# Patient Record
Sex: FEMALE | Race: WHITE | NOT HISPANIC OR LATINO | Employment: OTHER | URBAN - METROPOLITAN AREA
[De-identification: names, ages, dates, MRNs, and addresses within clinical notes are randomized per-mention and may not be internally consistent; named-entity substitution may affect disease eponyms.]

---

## 2017-01-03 ENCOUNTER — AMBULATORY - HEALTHEAST (OUTPATIENT)
Dept: ONCOLOGY | Facility: CLINIC | Age: 72
End: 2017-01-03

## 2017-01-03 ENCOUNTER — COMMUNICATION - HEALTHEAST (OUTPATIENT)
Dept: ONCOLOGY | Facility: CLINIC | Age: 72
End: 2017-01-03

## 2017-01-03 DIAGNOSIS — T45.1X5A PERIPHERAL NEUROPATHY DUE TO CHEMOTHERAPY (H): ICD-10-CM

## 2017-01-03 DIAGNOSIS — G62.0 PERIPHERAL NEUROPATHY DUE TO CHEMOTHERAPY (H): ICD-10-CM

## 2017-01-10 ENCOUNTER — AMBULATORY - HEALTHEAST (OUTPATIENT)
Dept: ONCOLOGY | Facility: CLINIC | Age: 72
End: 2017-01-10

## 2017-01-10 DIAGNOSIS — T45.1X5A PERIPHERAL NEUROPATHY DUE TO CHEMOTHERAPY (H): ICD-10-CM

## 2017-01-10 DIAGNOSIS — G62.0 PERIPHERAL NEUROPATHY DUE TO CHEMOTHERAPY (H): ICD-10-CM

## 2017-01-17 ENCOUNTER — AMBULATORY - HEALTHEAST (OUTPATIENT)
Dept: ONCOLOGY | Facility: CLINIC | Age: 72
End: 2017-01-17

## 2017-01-17 DIAGNOSIS — T45.1X5A PERIPHERAL NEUROPATHY DUE TO CHEMOTHERAPY (H): ICD-10-CM

## 2017-01-17 DIAGNOSIS — G62.0 PERIPHERAL NEUROPATHY DUE TO CHEMOTHERAPY (H): ICD-10-CM

## 2017-01-24 ENCOUNTER — AMBULATORY - HEALTHEAST (OUTPATIENT)
Dept: ONCOLOGY | Facility: CLINIC | Age: 72
End: 2017-01-24

## 2017-01-24 DIAGNOSIS — G62.0 PERIPHERAL NEUROPATHY DUE TO CHEMOTHERAPY (H): ICD-10-CM

## 2017-01-24 DIAGNOSIS — T45.1X5A PERIPHERAL NEUROPATHY DUE TO CHEMOTHERAPY (H): ICD-10-CM

## 2017-01-31 ENCOUNTER — AMBULATORY - HEALTHEAST (OUTPATIENT)
Dept: ONCOLOGY | Facility: CLINIC | Age: 72
End: 2017-01-31

## 2017-01-31 DIAGNOSIS — G62.0 PERIPHERAL NEUROPATHY DUE TO CHEMOTHERAPY (H): ICD-10-CM

## 2017-01-31 DIAGNOSIS — T45.1X5A PERIPHERAL NEUROPATHY DUE TO CHEMOTHERAPY (H): ICD-10-CM

## 2017-02-02 ENCOUNTER — AMBULATORY - HEALTHEAST (OUTPATIENT)
Dept: ONCOLOGY | Facility: CLINIC | Age: 72
End: 2017-02-02

## 2017-02-02 ENCOUNTER — OFFICE VISIT - HEALTHEAST (OUTPATIENT)
Dept: ONCOLOGY | Facility: CLINIC | Age: 72
End: 2017-02-02

## 2017-02-02 DIAGNOSIS — C50.411 BREAST CANCER OF UPPER-OUTER QUADRANT OF RIGHT FEMALE BREAST (H): ICD-10-CM

## 2017-02-02 DIAGNOSIS — G62.0 PERIPHERAL NEUROPATHY DUE TO CHEMOTHERAPY (H): ICD-10-CM

## 2017-02-02 DIAGNOSIS — T45.1X5A PERIPHERAL NEUROPATHY DUE TO CHEMOTHERAPY (H): ICD-10-CM

## 2017-02-07 ENCOUNTER — AMBULATORY - HEALTHEAST (OUTPATIENT)
Dept: ONCOLOGY | Facility: CLINIC | Age: 72
End: 2017-02-07

## 2017-02-07 DIAGNOSIS — M21.371 FOOT DROP, RIGHT: ICD-10-CM

## 2017-02-07 DIAGNOSIS — G57.93 NEUROPATHY OF BOTH FEET: ICD-10-CM

## 2017-02-16 ENCOUNTER — AMBULATORY - HEALTHEAST (OUTPATIENT)
Dept: ONCOLOGY | Facility: CLINIC | Age: 72
End: 2017-02-16

## 2017-02-16 DIAGNOSIS — G57.93 NEUROPATHY OF BOTH FEET: ICD-10-CM

## 2017-02-16 DIAGNOSIS — M21.371 FOOT DROP, RIGHT: ICD-10-CM

## 2017-02-16 DIAGNOSIS — G62.0 PERIPHERAL NEUROPATHY DUE TO CHEMOTHERAPY (H): ICD-10-CM

## 2017-02-16 DIAGNOSIS — T45.1X5A PERIPHERAL NEUROPATHY DUE TO CHEMOTHERAPY (H): ICD-10-CM

## 2017-02-21 ENCOUNTER — AMBULATORY - HEALTHEAST (OUTPATIENT)
Dept: ONCOLOGY | Facility: CLINIC | Age: 72
End: 2017-02-21

## 2017-02-21 DIAGNOSIS — G57.93 NEUROPATHY OF BOTH FEET: ICD-10-CM

## 2017-02-21 DIAGNOSIS — T45.1X5A PERIPHERAL NEUROPATHY DUE TO CHEMOTHERAPY (H): ICD-10-CM

## 2017-02-21 DIAGNOSIS — M21.371 FOOT DROP, RIGHT: ICD-10-CM

## 2017-02-21 DIAGNOSIS — G62.0 PERIPHERAL NEUROPATHY DUE TO CHEMOTHERAPY (H): ICD-10-CM

## 2017-02-28 ENCOUNTER — AMBULATORY - HEALTHEAST (OUTPATIENT)
Dept: ONCOLOGY | Facility: CLINIC | Age: 72
End: 2017-02-28

## 2017-02-28 DIAGNOSIS — G57.93 NEUROPATHY OF BOTH FEET: ICD-10-CM

## 2017-02-28 DIAGNOSIS — M21.371 FOOT DROP, RIGHT: ICD-10-CM

## 2017-02-28 DIAGNOSIS — T45.1X5A PERIPHERAL NEUROPATHY DUE TO CHEMOTHERAPY (H): ICD-10-CM

## 2017-02-28 DIAGNOSIS — G62.0 PERIPHERAL NEUROPATHY DUE TO CHEMOTHERAPY (H): ICD-10-CM

## 2017-03-07 ENCOUNTER — OFFICE VISIT - HEALTHEAST (OUTPATIENT)
Dept: ONCOLOGY | Facility: CLINIC | Age: 72
End: 2017-03-07

## 2017-03-07 ENCOUNTER — AMBULATORY - HEALTHEAST (OUTPATIENT)
Dept: ONCOLOGY | Facility: CLINIC | Age: 72
End: 2017-03-07

## 2017-03-07 DIAGNOSIS — T45.1X5A PERIPHERAL NEUROPATHY DUE TO CHEMOTHERAPY (H): ICD-10-CM

## 2017-03-07 DIAGNOSIS — M89.9 DISORDER OF BONE AND CARTILAGE: ICD-10-CM

## 2017-03-07 DIAGNOSIS — G62.0 PERIPHERAL NEUROPATHY DUE TO CHEMOTHERAPY (H): ICD-10-CM

## 2017-03-07 DIAGNOSIS — M94.9 DISORDER OF BONE AND CARTILAGE: ICD-10-CM

## 2017-03-07 DIAGNOSIS — G57.93 NEUROPATHY OF BOTH FEET: ICD-10-CM

## 2017-03-07 DIAGNOSIS — C50.411 BREAST CANCER OF UPPER-OUTER QUADRANT OF RIGHT FEMALE BREAST (H): ICD-10-CM

## 2017-03-07 DIAGNOSIS — Z71.9 HEALTH EDUCATION/COUNSELING: ICD-10-CM

## 2017-03-07 DIAGNOSIS — M21.371 FOOT DROP, RIGHT: ICD-10-CM

## 2017-03-14 ENCOUNTER — AMBULATORY - HEALTHEAST (OUTPATIENT)
Dept: ONCOLOGY | Facility: CLINIC | Age: 72
End: 2017-03-14

## 2017-03-14 DIAGNOSIS — M21.371 FOOT DROP, RIGHT: ICD-10-CM

## 2017-03-14 DIAGNOSIS — G57.93 NEUROPATHY OF BOTH FEET: ICD-10-CM

## 2017-03-14 DIAGNOSIS — G62.0 PERIPHERAL NEUROPATHY DUE TO CHEMOTHERAPY (H): ICD-10-CM

## 2017-03-14 DIAGNOSIS — T45.1X5A PERIPHERAL NEUROPATHY DUE TO CHEMOTHERAPY (H): ICD-10-CM

## 2017-03-21 ENCOUNTER — AMBULATORY - HEALTHEAST (OUTPATIENT)
Dept: ONCOLOGY | Facility: CLINIC | Age: 72
End: 2017-03-21

## 2017-03-21 DIAGNOSIS — M21.371 FOOT DROP, RIGHT: ICD-10-CM

## 2017-03-21 DIAGNOSIS — G57.93 NEUROPATHY OF BOTH FEET: ICD-10-CM

## 2017-03-24 ENCOUNTER — COMMUNICATION - HEALTHEAST (OUTPATIENT)
Dept: ONCOLOGY | Facility: CLINIC | Age: 72
End: 2017-03-24

## 2017-03-28 ENCOUNTER — AMBULATORY - HEALTHEAST (OUTPATIENT)
Dept: ONCOLOGY | Facility: CLINIC | Age: 72
End: 2017-03-28

## 2017-03-28 DIAGNOSIS — M21.371 FOOT DROP, RIGHT: ICD-10-CM

## 2017-03-28 DIAGNOSIS — G57.93 NEUROPATHY OF BOTH FEET: ICD-10-CM

## 2017-04-04 ENCOUNTER — AMBULATORY - HEALTHEAST (OUTPATIENT)
Dept: ONCOLOGY | Facility: CLINIC | Age: 72
End: 2017-04-04

## 2017-04-04 DIAGNOSIS — M21.371 FOOT DROP, RIGHT: ICD-10-CM

## 2017-04-04 DIAGNOSIS — G57.93 NEUROPATHY OF BOTH FEET: ICD-10-CM

## 2017-04-06 ENCOUNTER — AMBULATORY - HEALTHEAST (OUTPATIENT)
Dept: ONCOLOGY | Facility: CLINIC | Age: 72
End: 2017-04-06

## 2017-04-06 ENCOUNTER — OFFICE VISIT - HEALTHEAST (OUTPATIENT)
Dept: RADIATION ONCOLOGY | Facility: CLINIC | Age: 72
End: 2017-04-06

## 2017-04-06 DIAGNOSIS — C50.411 BREAST CANCER OF UPPER-OUTER QUADRANT OF RIGHT FEMALE BREAST (H): ICD-10-CM

## 2017-04-11 ENCOUNTER — AMBULATORY - HEALTHEAST (OUTPATIENT)
Dept: ONCOLOGY | Facility: CLINIC | Age: 72
End: 2017-04-11

## 2017-04-11 DIAGNOSIS — M21.371 FOOT DROP, RIGHT: ICD-10-CM

## 2017-04-11 DIAGNOSIS — G57.93 NEUROPATHY OF BOTH FEET: ICD-10-CM

## 2017-04-18 ENCOUNTER — AMBULATORY - HEALTHEAST (OUTPATIENT)
Dept: ONCOLOGY | Facility: CLINIC | Age: 72
End: 2017-04-18

## 2017-04-18 DIAGNOSIS — G62.0 PERIPHERAL NEUROPATHY DUE TO CHEMOTHERAPY (H): ICD-10-CM

## 2017-04-18 DIAGNOSIS — T45.1X5A PERIPHERAL NEUROPATHY DUE TO CHEMOTHERAPY (H): ICD-10-CM

## 2017-04-18 DIAGNOSIS — G57.93 NEUROPATHY OF BOTH FEET: ICD-10-CM

## 2017-04-18 DIAGNOSIS — M21.371 FOOT DROP, RIGHT: ICD-10-CM

## 2017-04-25 ENCOUNTER — AMBULATORY - HEALTHEAST (OUTPATIENT)
Dept: ONCOLOGY | Facility: CLINIC | Age: 72
End: 2017-04-25

## 2017-04-25 DIAGNOSIS — M21.371 FOOT DROP, RIGHT: ICD-10-CM

## 2017-04-25 DIAGNOSIS — G62.0 PERIPHERAL NEUROPATHY DUE TO CHEMOTHERAPY (H): ICD-10-CM

## 2017-04-25 DIAGNOSIS — T45.1X5A PERIPHERAL NEUROPATHY DUE TO CHEMOTHERAPY (H): ICD-10-CM

## 2017-04-25 DIAGNOSIS — G57.93 NEUROPATHY OF BOTH FEET: ICD-10-CM

## 2017-05-02 ENCOUNTER — AMBULATORY - HEALTHEAST (OUTPATIENT)
Dept: ONCOLOGY | Facility: CLINIC | Age: 72
End: 2017-05-02

## 2017-05-02 DIAGNOSIS — T45.1X5A PERIPHERAL NEUROPATHY DUE TO CHEMOTHERAPY (H): ICD-10-CM

## 2017-05-02 DIAGNOSIS — M21.371 FOOT DROP, RIGHT: ICD-10-CM

## 2017-05-02 DIAGNOSIS — G62.0 PERIPHERAL NEUROPATHY DUE TO CHEMOTHERAPY (H): ICD-10-CM

## 2017-05-02 DIAGNOSIS — G57.93 NEUROPATHY OF BOTH FEET: ICD-10-CM

## 2017-05-04 ENCOUNTER — OFFICE VISIT - HEALTHEAST (OUTPATIENT)
Dept: ONCOLOGY | Facility: CLINIC | Age: 72
End: 2017-05-04

## 2017-05-04 ENCOUNTER — AMBULATORY - HEALTHEAST (OUTPATIENT)
Dept: ONCOLOGY | Facility: CLINIC | Age: 72
End: 2017-05-04

## 2017-05-04 DIAGNOSIS — G62.0 PERIPHERAL NEUROPATHY DUE TO CHEMOTHERAPY (H): ICD-10-CM

## 2017-05-04 DIAGNOSIS — Z12.31 ENCOUNTER FOR SCREENING MAMMOGRAM FOR MALIGNANT NEOPLASM OF BREAST: ICD-10-CM

## 2017-05-04 DIAGNOSIS — C50.411 BREAST CANCER OF UPPER-OUTER QUADRANT OF RIGHT FEMALE BREAST (H): ICD-10-CM

## 2017-05-04 DIAGNOSIS — T45.1X5A PERIPHERAL NEUROPATHY DUE TO CHEMOTHERAPY (H): ICD-10-CM

## 2017-05-04 ASSESSMENT — MIFFLIN-ST. JEOR: SCORE: 1229.1

## 2017-05-12 ENCOUNTER — AMBULATORY - HEALTHEAST (OUTPATIENT)
Dept: ONCOLOGY | Facility: CLINIC | Age: 72
End: 2017-05-12

## 2017-05-12 DIAGNOSIS — G57.93 NEUROPATHY OF BOTH FEET: ICD-10-CM

## 2017-05-12 DIAGNOSIS — M21.371 FOOT DROP, RIGHT: ICD-10-CM

## 2017-05-16 ENCOUNTER — AMBULATORY - HEALTHEAST (OUTPATIENT)
Dept: ONCOLOGY | Facility: CLINIC | Age: 72
End: 2017-05-16

## 2017-05-16 DIAGNOSIS — M21.371 FOOT DROP, RIGHT: ICD-10-CM

## 2017-05-16 DIAGNOSIS — G57.93 NEUROPATHY OF BOTH FEET: ICD-10-CM

## 2017-05-16 DIAGNOSIS — T45.1X5A PERIPHERAL NEUROPATHY DUE TO CHEMOTHERAPY (H): ICD-10-CM

## 2017-05-16 DIAGNOSIS — G62.0 PERIPHERAL NEUROPATHY DUE TO CHEMOTHERAPY (H): ICD-10-CM

## 2017-05-23 ENCOUNTER — AMBULATORY - HEALTHEAST (OUTPATIENT)
Dept: ONCOLOGY | Facility: CLINIC | Age: 72
End: 2017-05-23

## 2017-05-23 DIAGNOSIS — M21.371 FOOT DROP, RIGHT: ICD-10-CM

## 2017-05-23 DIAGNOSIS — G57.93 NEUROPATHY OF BOTH FEET: ICD-10-CM

## 2017-08-04 ENCOUNTER — OFFICE VISIT - HEALTHEAST (OUTPATIENT)
Dept: ONCOLOGY | Facility: CLINIC | Age: 72
End: 2017-08-04

## 2017-08-04 DIAGNOSIS — T45.1X5A PERIPHERAL NEUROPATHY DUE TO CHEMOTHERAPY (H): ICD-10-CM

## 2017-08-04 DIAGNOSIS — G62.0 PERIPHERAL NEUROPATHY DUE TO CHEMOTHERAPY (H): ICD-10-CM

## 2017-08-04 DIAGNOSIS — C50.411 BREAST CANCER OF UPPER-OUTER QUADRANT OF RIGHT FEMALE BREAST (H): ICD-10-CM

## 2017-08-04 ASSESSMENT — MIFFLIN-ST. JEOR: SCORE: 1254.05

## 2017-11-06 ENCOUNTER — OFFICE VISIT - HEALTHEAST (OUTPATIENT)
Dept: ONCOLOGY | Facility: CLINIC | Age: 72
End: 2017-11-06

## 2017-11-06 ENCOUNTER — AMBULATORY - HEALTHEAST (OUTPATIENT)
Dept: ONCOLOGY | Facility: CLINIC | Age: 72
End: 2017-11-06

## 2017-11-06 DIAGNOSIS — G62.0 PERIPHERAL NEUROPATHY DUE TO CHEMOTHERAPY (H): ICD-10-CM

## 2017-11-06 DIAGNOSIS — Z17.1 MALIGNANT NEOPLASM OF UPPER-OUTER QUADRANT OF RIGHT BREAST IN FEMALE, ESTROGEN RECEPTOR NEGATIVE (H): ICD-10-CM

## 2017-11-06 DIAGNOSIS — C50.411 MALIGNANT NEOPLASM OF UPPER-OUTER QUADRANT OF RIGHT BREAST IN FEMALE, ESTROGEN RECEPTOR NEGATIVE (H): ICD-10-CM

## 2017-11-06 DIAGNOSIS — T45.1X5A PERIPHERAL NEUROPATHY DUE TO CHEMOTHERAPY (H): ICD-10-CM

## 2017-12-18 ENCOUNTER — HOSPITAL ENCOUNTER (OUTPATIENT)
Dept: MAMMOGRAPHY | Facility: CLINIC | Age: 72
Discharge: HOME | End: 2017-12-18
Attending: INTERNAL MEDICINE

## 2017-12-18 DIAGNOSIS — Z12.31 ENCOUNTER FOR SCREENING MAMMOGRAM FOR MALIGNANT NEOPLASM OF BREAST: ICD-10-CM

## 2017-12-18 DIAGNOSIS — C50.411 BREAST CANCER OF UPPER-OUTER QUADRANT OF RIGHT FEMALE BREAST (H): ICD-10-CM

## 2017-12-28 ENCOUNTER — OFFICE VISIT - HEALTHEAST (OUTPATIENT)
Dept: SURGERY | Facility: CLINIC | Age: 72
End: 2017-12-28

## 2017-12-28 DIAGNOSIS — Z85.3 PERSONAL HISTORY OF BREAST CANCER: ICD-10-CM

## 2018-01-19 ENCOUNTER — OFFICE VISIT - HEALTHEAST (OUTPATIENT)
Dept: FAMILY MEDICINE | Facility: CLINIC | Age: 73
End: 2018-01-19

## 2018-01-19 DIAGNOSIS — Z17.1 MALIGNANT NEOPLASM OF UPPER-OUTER QUADRANT OF RIGHT BREAST IN FEMALE, ESTROGEN RECEPTOR NEGATIVE (H): ICD-10-CM

## 2018-01-19 DIAGNOSIS — D12.6 BENIGN NEOPLASM OF COLON: ICD-10-CM

## 2018-01-19 DIAGNOSIS — E87.6 HYPOKALEMIA: ICD-10-CM

## 2018-01-19 DIAGNOSIS — M94.9 DISORDER OF BONE AND CARTILAGE: ICD-10-CM

## 2018-01-19 DIAGNOSIS — E78.2 MIXED HYPERLIPIDEMIA: ICD-10-CM

## 2018-01-19 DIAGNOSIS — C50.411 MALIGNANT NEOPLASM OF UPPER-OUTER QUADRANT OF RIGHT BREAST IN FEMALE, ESTROGEN RECEPTOR NEGATIVE (H): ICD-10-CM

## 2018-01-19 DIAGNOSIS — M89.9 DISORDER OF BONE AND CARTILAGE: ICD-10-CM

## 2018-01-19 DIAGNOSIS — R73.09 OTHER ABNORMAL GLUCOSE: ICD-10-CM

## 2018-01-19 LAB
ALBUMIN SERPL-MCNC: 3.8 G/DL (ref 3.5–5)
ALP SERPL-CCNC: 96 U/L (ref 45–120)
ALT SERPL W P-5'-P-CCNC: 21 U/L (ref 0–45)
ANION GAP SERPL CALCULATED.3IONS-SCNC: 7 MMOL/L (ref 5–18)
AST SERPL W P-5'-P-CCNC: 24 U/L (ref 0–40)
BILIRUB SERPL-MCNC: 0.6 MG/DL (ref 0–1)
BUN SERPL-MCNC: 12 MG/DL (ref 8–28)
CALCIUM SERPL-MCNC: 9.6 MG/DL (ref 8.5–10.5)
CHLORIDE BLD-SCNC: 103 MMOL/L (ref 98–107)
CHOLEST SERPL-MCNC: 155 MG/DL
CO2 SERPL-SCNC: 28 MMOL/L (ref 22–31)
CREAT SERPL-MCNC: 0.75 MG/DL (ref 0.6–1.1)
FASTING STATUS PATIENT QL REPORTED: YES
GFR SERPL CREATININE-BSD FRML MDRD: >60 ML/MIN/1.73M2
GLUCOSE BLD-MCNC: 103 MG/DL (ref 70–125)
HDLC SERPL-MCNC: 56 MG/DL
LDLC SERPL CALC-MCNC: 87 MG/DL
POTASSIUM BLD-SCNC: 4.2 MMOL/L (ref 3.5–5)
PROT SERPL-MCNC: 6.5 G/DL (ref 6–8)
SODIUM SERPL-SCNC: 138 MMOL/L (ref 136–145)
TRIGL SERPL-MCNC: 58 MG/DL

## 2018-01-19 ASSESSMENT — MIFFLIN-ST. JEOR: SCORE: 1269.92

## 2018-01-22 ENCOUNTER — COMMUNICATION - HEALTHEAST (OUTPATIENT)
Dept: FAMILY MEDICINE | Facility: CLINIC | Age: 73
End: 2018-01-22

## 2018-02-05 ENCOUNTER — AMBULATORY - HEALTHEAST (OUTPATIENT)
Dept: INFUSION THERAPY | Facility: CLINIC | Age: 73
End: 2018-02-05

## 2018-02-05 ENCOUNTER — OFFICE VISIT - HEALTHEAST (OUTPATIENT)
Dept: ONCOLOGY | Facility: CLINIC | Age: 73
End: 2018-02-05

## 2018-02-05 DIAGNOSIS — T45.1X5A PERIPHERAL NEUROPATHY DUE TO CHEMOTHERAPY (H): ICD-10-CM

## 2018-02-05 DIAGNOSIS — C50.411 MALIGNANT NEOPLASM OF UPPER-OUTER QUADRANT OF RIGHT BREAST IN FEMALE, ESTROGEN RECEPTOR NEGATIVE (H): ICD-10-CM

## 2018-02-05 DIAGNOSIS — Z17.1 MALIGNANT NEOPLASM OF UPPER-OUTER QUADRANT OF RIGHT BREAST IN FEMALE, ESTROGEN RECEPTOR NEGATIVE (H): ICD-10-CM

## 2018-02-05 DIAGNOSIS — G62.0 PERIPHERAL NEUROPATHY DUE TO CHEMOTHERAPY (H): ICD-10-CM

## 2018-05-07 ENCOUNTER — OFFICE VISIT - HEALTHEAST (OUTPATIENT)
Dept: ONCOLOGY | Facility: CLINIC | Age: 73
End: 2018-05-07

## 2018-05-07 DIAGNOSIS — C50.411 MALIGNANT NEOPLASM OF UPPER-OUTER QUADRANT OF RIGHT BREAST IN FEMALE, ESTROGEN RECEPTOR NEGATIVE (H): ICD-10-CM

## 2018-05-07 DIAGNOSIS — Z17.1 MALIGNANT NEOPLASM OF UPPER-OUTER QUADRANT OF RIGHT BREAST IN FEMALE, ESTROGEN RECEPTOR NEGATIVE (H): ICD-10-CM

## 2018-05-07 DIAGNOSIS — G62.0 PERIPHERAL NEUROPATHY DUE TO CHEMOTHERAPY (H): ICD-10-CM

## 2018-05-07 DIAGNOSIS — T45.1X5A PERIPHERAL NEUROPATHY DUE TO CHEMOTHERAPY (H): ICD-10-CM

## 2018-08-08 ENCOUNTER — OFFICE VISIT - HEALTHEAST (OUTPATIENT)
Dept: ONCOLOGY | Facility: CLINIC | Age: 73
End: 2018-08-08

## 2018-08-08 ENCOUNTER — AMBULATORY - HEALTHEAST (OUTPATIENT)
Dept: ONCOLOGY | Facility: CLINIC | Age: 73
End: 2018-08-08

## 2018-08-08 DIAGNOSIS — Z12.31 ENCOUNTER FOR SCREENING MAMMOGRAM FOR MALIGNANT NEOPLASM OF BREAST: ICD-10-CM

## 2018-08-08 DIAGNOSIS — C50.411 MALIGNANT NEOPLASM OF UPPER-OUTER QUADRANT OF RIGHT BREAST IN FEMALE, ESTROGEN RECEPTOR NEGATIVE (H): ICD-10-CM

## 2018-08-08 DIAGNOSIS — G62.0 PERIPHERAL NEUROPATHY DUE TO CHEMOTHERAPY (H): ICD-10-CM

## 2018-08-08 DIAGNOSIS — M89.9 DISORDER OF BONE AND CARTILAGE: ICD-10-CM

## 2018-08-08 DIAGNOSIS — T45.1X5A PERIPHERAL NEUROPATHY DUE TO CHEMOTHERAPY (H): ICD-10-CM

## 2018-08-08 DIAGNOSIS — M94.9 DISORDER OF BONE AND CARTILAGE: ICD-10-CM

## 2018-08-08 DIAGNOSIS — Z17.1 MALIGNANT NEOPLASM OF UPPER-OUTER QUADRANT OF RIGHT BREAST IN FEMALE, ESTROGEN RECEPTOR NEGATIVE (H): ICD-10-CM

## 2018-08-08 ASSESSMENT — MIFFLIN-ST. JEOR: SCORE: 1300.32

## 2018-08-09 ENCOUNTER — COMMUNICATION - HEALTHEAST (OUTPATIENT)
Dept: ONCOLOGY | Facility: CLINIC | Age: 73
End: 2018-08-09

## 2018-08-10 ENCOUNTER — COMMUNICATION - HEALTHEAST (OUTPATIENT)
Dept: ONCOLOGY | Facility: CLINIC | Age: 73
End: 2018-08-10

## 2018-08-17 ENCOUNTER — COMMUNICATION - HEALTHEAST (OUTPATIENT)
Dept: FAMILY MEDICINE | Facility: CLINIC | Age: 73
End: 2018-08-17

## 2018-08-29 ENCOUNTER — COMMUNICATION - HEALTHEAST (OUTPATIENT)
Dept: FAMILY MEDICINE | Facility: CLINIC | Age: 73
End: 2018-08-29

## 2018-08-29 DIAGNOSIS — E78.2 MIXED HYPERLIPIDEMIA: ICD-10-CM

## 2018-09-05 ENCOUNTER — COMMUNICATION - HEALTHEAST (OUTPATIENT)
Dept: FAMILY MEDICINE | Facility: CLINIC | Age: 73
End: 2018-09-05

## 2018-09-05 ENCOUNTER — OFFICE VISIT - HEALTHEAST (OUTPATIENT)
Dept: FAMILY MEDICINE | Facility: CLINIC | Age: 73
End: 2018-09-05

## 2018-09-05 DIAGNOSIS — E78.2 MIXED HYPERLIPIDEMIA: ICD-10-CM

## 2018-09-05 DIAGNOSIS — R73.09 OTHER ABNORMAL GLUCOSE: ICD-10-CM

## 2018-09-05 DIAGNOSIS — Z12.11 SCREEN FOR COLON CANCER: ICD-10-CM

## 2018-09-05 DIAGNOSIS — E87.6 HYPOKALEMIA: ICD-10-CM

## 2018-09-05 LAB
ALBUMIN SERPL-MCNC: 3.6 G/DL (ref 3.5–5)
ALP SERPL-CCNC: 92 U/L (ref 45–120)
ALT SERPL W P-5'-P-CCNC: 23 U/L (ref 0–45)
ANION GAP SERPL CALCULATED.3IONS-SCNC: 10 MMOL/L (ref 5–18)
AST SERPL W P-5'-P-CCNC: 31 U/L (ref 0–40)
BILIRUB SERPL-MCNC: 0.5 MG/DL (ref 0–1)
BUN SERPL-MCNC: 11 MG/DL (ref 8–28)
CALCIUM SERPL-MCNC: 9.5 MG/DL (ref 8.5–10.5)
CHLORIDE BLD-SCNC: 108 MMOL/L (ref 98–107)
CHOLEST SERPL-MCNC: 196 MG/DL
CO2 SERPL-SCNC: 25 MMOL/L (ref 22–31)
CREAT SERPL-MCNC: 0.76 MG/DL (ref 0.6–1.1)
FASTING STATUS PATIENT QL REPORTED: YES
GFR SERPL CREATININE-BSD FRML MDRD: >60 ML/MIN/1.73M2
GLUCOSE BLD-MCNC: 95 MG/DL (ref 70–125)
HDLC SERPL-MCNC: 54 MG/DL
LDLC SERPL CALC-MCNC: 128 MG/DL
POTASSIUM BLD-SCNC: 4.5 MMOL/L (ref 3.5–5)
PROT SERPL-MCNC: 6.5 G/DL (ref 6–8)
SODIUM SERPL-SCNC: 143 MMOL/L (ref 136–145)
TRIGL SERPL-MCNC: 68 MG/DL

## 2018-11-01 ENCOUNTER — COMMUNICATION - HEALTHEAST (OUTPATIENT)
Dept: FAMILY MEDICINE | Facility: CLINIC | Age: 73
End: 2018-11-01

## 2018-11-01 DIAGNOSIS — E78.2 MIXED HYPERLIPIDEMIA: ICD-10-CM

## 2018-11-05 ENCOUNTER — HOSPITAL ENCOUNTER (OUTPATIENT)
Dept: MAMMOGRAPHY | Facility: CLINIC | Age: 73
Setting detail: RADIATION/ONCOLOGY SERIES
Discharge: STILL ADMITTED OR ADMINISTRATIVE | End: 2018-11-05
Attending: INTERNAL MEDICINE

## 2018-11-05 DIAGNOSIS — C50.411 MALIGNANT NEOPLASM OF UPPER-OUTER QUADRANT OF RIGHT BREAST IN FEMALE, ESTROGEN RECEPTOR NEGATIVE (H): ICD-10-CM

## 2018-11-05 DIAGNOSIS — Z17.1 MALIGNANT NEOPLASM OF UPPER-OUTER QUADRANT OF RIGHT BREAST IN FEMALE, ESTROGEN RECEPTOR NEGATIVE (H): ICD-10-CM

## 2018-11-05 DIAGNOSIS — Z12.31 ENCOUNTER FOR SCREENING MAMMOGRAM FOR MALIGNANT NEOPLASM OF BREAST: ICD-10-CM

## 2018-11-09 ENCOUNTER — OFFICE VISIT - HEALTHEAST (OUTPATIENT)
Dept: ONCOLOGY | Facility: CLINIC | Age: 73
End: 2018-11-09

## 2018-11-09 DIAGNOSIS — Z17.1 MALIGNANT NEOPLASM OF UPPER-OUTER QUADRANT OF RIGHT BREAST IN FEMALE, ESTROGEN RECEPTOR NEGATIVE (H): ICD-10-CM

## 2018-11-09 DIAGNOSIS — C50.411 MALIGNANT NEOPLASM OF UPPER-OUTER QUADRANT OF RIGHT BREAST IN FEMALE, ESTROGEN RECEPTOR NEGATIVE (H): ICD-10-CM

## 2018-11-09 DIAGNOSIS — G62.0 PERIPHERAL NEUROPATHY DUE TO CHEMOTHERAPY (H): ICD-10-CM

## 2018-11-09 DIAGNOSIS — T45.1X5A PERIPHERAL NEUROPATHY DUE TO CHEMOTHERAPY (H): ICD-10-CM

## 2019-02-08 ENCOUNTER — OFFICE VISIT - HEALTHEAST (OUTPATIENT)
Dept: ONCOLOGY | Facility: CLINIC | Age: 74
End: 2019-02-08

## 2019-02-08 DIAGNOSIS — M89.9 DISORDER OF BONE AND CARTILAGE: ICD-10-CM

## 2019-02-08 DIAGNOSIS — G62.0 PERIPHERAL NEUROPATHY DUE TO CHEMOTHERAPY (H): ICD-10-CM

## 2019-02-08 DIAGNOSIS — T45.1X5A PERIPHERAL NEUROPATHY DUE TO CHEMOTHERAPY (H): ICD-10-CM

## 2019-02-08 DIAGNOSIS — Z17.1 MALIGNANT NEOPLASM OF UPPER-OUTER QUADRANT OF RIGHT BREAST IN FEMALE, ESTROGEN RECEPTOR NEGATIVE (H): ICD-10-CM

## 2019-02-08 DIAGNOSIS — M94.9 DISORDER OF BONE AND CARTILAGE: ICD-10-CM

## 2019-02-08 DIAGNOSIS — C50.411 MALIGNANT NEOPLASM OF UPPER-OUTER QUADRANT OF RIGHT BREAST IN FEMALE, ESTROGEN RECEPTOR NEGATIVE (H): ICD-10-CM

## 2019-04-05 ENCOUNTER — OFFICE VISIT - HEALTHEAST (OUTPATIENT)
Dept: FAMILY MEDICINE | Facility: CLINIC | Age: 74
End: 2019-04-05

## 2019-04-05 DIAGNOSIS — E87.6 HYPOKALEMIA: ICD-10-CM

## 2019-04-05 DIAGNOSIS — E78.2 MIXED HYPERLIPIDEMIA: ICD-10-CM

## 2019-04-05 DIAGNOSIS — R73.09 OTHER ABNORMAL GLUCOSE: ICD-10-CM

## 2019-04-05 DIAGNOSIS — Z12.11 SCREEN FOR COLON CANCER: ICD-10-CM

## 2019-04-05 LAB
ALBUMIN SERPL-MCNC: 3.8 G/DL (ref 3.5–5)
ALP SERPL-CCNC: 92 U/L (ref 45–120)
ALT SERPL W P-5'-P-CCNC: 18 U/L (ref 0–45)
ANION GAP SERPL CALCULATED.3IONS-SCNC: 9 MMOL/L (ref 5–18)
AST SERPL W P-5'-P-CCNC: 23 U/L (ref 0–40)
BILIRUB SERPL-MCNC: 0.4 MG/DL (ref 0–1)
BUN SERPL-MCNC: 10 MG/DL (ref 8–28)
CALCIUM SERPL-MCNC: 9.6 MG/DL (ref 8.5–10.5)
CHLORIDE BLD-SCNC: 108 MMOL/L (ref 98–107)
CHOLEST SERPL-MCNC: 192 MG/DL
CO2 SERPL-SCNC: 26 MMOL/L (ref 22–31)
CREAT SERPL-MCNC: 0.77 MG/DL (ref 0.6–1.1)
FASTING STATUS PATIENT QL REPORTED: YES
GFR SERPL CREATININE-BSD FRML MDRD: >60 ML/MIN/1.73M2
GLUCOSE BLD-MCNC: 97 MG/DL (ref 70–125)
HDLC SERPL-MCNC: 62 MG/DL
LDLC SERPL CALC-MCNC: 115 MG/DL
POTASSIUM BLD-SCNC: 4.2 MMOL/L (ref 3.5–5)
PROT SERPL-MCNC: 6.5 G/DL (ref 6–8)
SODIUM SERPL-SCNC: 143 MMOL/L (ref 136–145)
TRIGL SERPL-MCNC: 77 MG/DL

## 2019-05-16 ENCOUNTER — RECORDS - HEALTHEAST (OUTPATIENT)
Dept: ADMINISTRATIVE | Facility: OTHER | Age: 74
End: 2019-05-16

## 2019-05-24 ENCOUNTER — AMBULATORY - HEALTHEAST (OUTPATIENT)
Dept: ONCOLOGY | Facility: CLINIC | Age: 74
End: 2019-05-24

## 2019-05-24 ENCOUNTER — OFFICE VISIT - HEALTHEAST (OUTPATIENT)
Dept: ONCOLOGY | Facility: CLINIC | Age: 74
End: 2019-05-24

## 2019-05-24 DIAGNOSIS — T45.1X5A PERIPHERAL NEUROPATHY DUE TO CHEMOTHERAPY (H): ICD-10-CM

## 2019-05-24 DIAGNOSIS — G62.0 PERIPHERAL NEUROPATHY DUE TO CHEMOTHERAPY (H): ICD-10-CM

## 2019-05-24 DIAGNOSIS — Z17.1 MALIGNANT NEOPLASM OF UPPER-OUTER QUADRANT OF RIGHT BREAST IN FEMALE, ESTROGEN RECEPTOR NEGATIVE (H): ICD-10-CM

## 2019-05-24 DIAGNOSIS — Z12.31 ENCOUNTER FOR SCREENING MAMMOGRAM FOR MALIGNANT NEOPLASM OF BREAST: ICD-10-CM

## 2019-05-24 DIAGNOSIS — C50.411 MALIGNANT NEOPLASM OF UPPER-OUTER QUADRANT OF RIGHT BREAST IN FEMALE, ESTROGEN RECEPTOR NEGATIVE (H): ICD-10-CM

## 2019-07-30 ENCOUNTER — COMMUNICATION - HEALTHEAST (OUTPATIENT)
Dept: ONCOLOGY | Facility: CLINIC | Age: 74
End: 2019-07-30

## 2019-10-31 ENCOUNTER — RECORDS - HEALTHEAST (OUTPATIENT)
Dept: ADMINISTRATIVE | Facility: OTHER | Age: 74
End: 2019-10-31

## 2019-11-13 ENCOUNTER — HOSPITAL ENCOUNTER (OUTPATIENT)
Dept: MAMMOGRAPHY | Facility: CLINIC | Age: 74
Setting detail: RADIATION/ONCOLOGY SERIES
Discharge: STILL ADMITTED OR ADMINISTRATIVE | End: 2019-11-13
Attending: INTERNAL MEDICINE

## 2019-11-13 DIAGNOSIS — C50.411 MALIGNANT NEOPLASM OF UPPER-OUTER QUADRANT OF RIGHT BREAST IN FEMALE, ESTROGEN RECEPTOR NEGATIVE (H): ICD-10-CM

## 2019-11-13 DIAGNOSIS — Z17.1 MALIGNANT NEOPLASM OF UPPER-OUTER QUADRANT OF RIGHT BREAST IN FEMALE, ESTROGEN RECEPTOR NEGATIVE (H): ICD-10-CM

## 2019-11-13 DIAGNOSIS — Z12.31 ENCOUNTER FOR SCREENING MAMMOGRAM FOR MALIGNANT NEOPLASM OF BREAST: ICD-10-CM

## 2019-11-20 ENCOUNTER — OFFICE VISIT - HEALTHEAST (OUTPATIENT)
Dept: ONCOLOGY | Facility: CLINIC | Age: 74
End: 2019-11-20

## 2019-11-20 DIAGNOSIS — C50.411 MALIGNANT NEOPLASM OF UPPER-OUTER QUADRANT OF RIGHT BREAST IN FEMALE, ESTROGEN RECEPTOR NEGATIVE (H): ICD-10-CM

## 2019-11-20 DIAGNOSIS — G62.0 PERIPHERAL NEUROPATHY DUE TO CHEMOTHERAPY (H): ICD-10-CM

## 2019-11-20 DIAGNOSIS — T45.1X5A PERIPHERAL NEUROPATHY DUE TO CHEMOTHERAPY (H): ICD-10-CM

## 2019-11-20 DIAGNOSIS — Z17.1 MALIGNANT NEOPLASM OF UPPER-OUTER QUADRANT OF RIGHT BREAST IN FEMALE, ESTROGEN RECEPTOR NEGATIVE (H): ICD-10-CM

## 2019-11-20 DIAGNOSIS — R10.13 EPIGASTRIC PAIN: ICD-10-CM

## 2019-11-22 ENCOUNTER — RECORDS - HEALTHEAST (OUTPATIENT)
Dept: ADMINISTRATIVE | Facility: OTHER | Age: 74
End: 2019-11-22

## 2019-11-25 ENCOUNTER — HOSPITAL ENCOUNTER (OUTPATIENT)
Dept: CT IMAGING | Facility: CLINIC | Age: 74
Discharge: HOME | End: 2019-11-25
Attending: PHYSICIAN ASSISTANT

## 2019-11-25 DIAGNOSIS — R10.13 EPIGASTRIC PAIN: ICD-10-CM

## 2019-11-25 LAB
CREAT BLD-MCNC: 0.9 MG/DL (ref 0.6–1.1)
GFR SERPL CREATININE-BSD FRML MDRD: >60 ML/MIN/1.73M2

## 2019-12-04 ENCOUNTER — COMMUNICATION - HEALTHEAST (OUTPATIENT)
Dept: ONCOLOGY | Facility: CLINIC | Age: 74
End: 2019-12-04

## 2020-01-20 ENCOUNTER — COMMUNICATION - HEALTHEAST (OUTPATIENT)
Dept: FAMILY MEDICINE | Facility: CLINIC | Age: 75
End: 2020-01-20

## 2020-01-20 DIAGNOSIS — E78.2 MIXED HYPERLIPIDEMIA: ICD-10-CM

## 2020-01-28 ENCOUNTER — RECORDS - HEALTHEAST (OUTPATIENT)
Dept: ADMINISTRATIVE | Facility: OTHER | Age: 75
End: 2020-01-28

## 2020-02-07 ENCOUNTER — OFFICE VISIT - HEALTHEAST (OUTPATIENT)
Dept: FAMILY MEDICINE | Facility: CLINIC | Age: 75
End: 2020-02-07

## 2020-02-07 DIAGNOSIS — C50.411 MALIGNANT NEOPLASM OF UPPER-OUTER QUADRANT OF RIGHT BREAST IN FEMALE, ESTROGEN RECEPTOR NEGATIVE (H): ICD-10-CM

## 2020-02-07 DIAGNOSIS — M94.9 DISORDER OF BONE AND CARTILAGE: ICD-10-CM

## 2020-02-07 DIAGNOSIS — M89.9 DISORDER OF BONE AND CARTILAGE: ICD-10-CM

## 2020-02-07 DIAGNOSIS — Z00.01 ENCOUNTER FOR GENERAL ADULT MEDICAL EXAMINATION WITH ABNORMAL FINDINGS: ICD-10-CM

## 2020-02-07 DIAGNOSIS — Z17.1 MALIGNANT NEOPLASM OF UPPER-OUTER QUADRANT OF RIGHT BREAST IN FEMALE, ESTROGEN RECEPTOR NEGATIVE (H): ICD-10-CM

## 2020-02-07 DIAGNOSIS — R73.09 OTHER ABNORMAL GLUCOSE: ICD-10-CM

## 2020-02-07 DIAGNOSIS — D12.6 BENIGN NEOPLASM OF COLON, UNSPECIFIED PART OF COLON: ICD-10-CM

## 2020-02-07 DIAGNOSIS — E78.2 MIXED HYPERLIPIDEMIA: ICD-10-CM

## 2020-02-07 LAB
ALBUMIN SERPL-MCNC: 4.2 G/DL (ref 3.5–5)
ALP SERPL-CCNC: 64 U/L (ref 45–120)
ALT SERPL W P-5'-P-CCNC: 35 U/L (ref 0–45)
ANION GAP SERPL CALCULATED.3IONS-SCNC: 7 MMOL/L (ref 5–18)
AST SERPL W P-5'-P-CCNC: 32 U/L (ref 0–40)
BILIRUB SERPL-MCNC: 0.7 MG/DL (ref 0–1)
BUN SERPL-MCNC: 8 MG/DL (ref 8–28)
CALCIUM SERPL-MCNC: 9.4 MG/DL (ref 8.5–10.5)
CHLORIDE BLD-SCNC: 106 MMOL/L (ref 98–107)
CHOLEST SERPL-MCNC: 177 MG/DL
CO2 SERPL-SCNC: 29 MMOL/L (ref 22–31)
CREAT SERPL-MCNC: 0.83 MG/DL (ref 0.6–1.1)
FASTING STATUS PATIENT QL REPORTED: YES
GFR SERPL CREATININE-BSD FRML MDRD: >60 ML/MIN/1.73M2
GLUCOSE BLD-MCNC: 98 MG/DL (ref 70–125)
HDLC SERPL-MCNC: 62 MG/DL
LDLC SERPL CALC-MCNC: 105 MG/DL
POTASSIUM BLD-SCNC: 3.9 MMOL/L (ref 3.5–5)
PROT SERPL-MCNC: 6.4 G/DL (ref 6–8)
SODIUM SERPL-SCNC: 142 MMOL/L (ref 136–145)
TRIGL SERPL-MCNC: 52 MG/DL

## 2020-02-07 ASSESSMENT — MIFFLIN-ST. JEOR: SCORE: 1266.53

## 2020-05-20 ENCOUNTER — OFFICE VISIT - HEALTHEAST (OUTPATIENT)
Dept: ONCOLOGY | Facility: CLINIC | Age: 75
End: 2020-05-20

## 2020-05-20 DIAGNOSIS — C50.411 MALIGNANT NEOPLASM OF UPPER-OUTER QUADRANT OF RIGHT BREAST IN FEMALE, ESTROGEN RECEPTOR NEGATIVE (H): ICD-10-CM

## 2020-05-20 DIAGNOSIS — G62.0 PERIPHERAL NEUROPATHY DUE TO CHEMOTHERAPY (H): ICD-10-CM

## 2020-05-20 DIAGNOSIS — Z17.1 MALIGNANT NEOPLASM OF UPPER-OUTER QUADRANT OF RIGHT BREAST IN FEMALE, ESTROGEN RECEPTOR NEGATIVE (H): ICD-10-CM

## 2020-05-20 DIAGNOSIS — T45.1X5A PERIPHERAL NEUROPATHY DUE TO CHEMOTHERAPY (H): ICD-10-CM

## 2020-06-16 ENCOUNTER — COMMUNICATION - HEALTHEAST (OUTPATIENT)
Dept: FAMILY MEDICINE | Facility: CLINIC | Age: 75
End: 2020-06-16

## 2020-06-17 ENCOUNTER — COMMUNICATION - HEALTHEAST (OUTPATIENT)
Dept: FAMILY MEDICINE | Facility: CLINIC | Age: 75
End: 2020-06-17

## 2020-08-11 ENCOUNTER — OFFICE VISIT - HEALTHEAST (OUTPATIENT)
Dept: FAMILY MEDICINE | Facility: CLINIC | Age: 75
End: 2020-08-11

## 2020-08-11 DIAGNOSIS — R73.09 OTHER ABNORMAL GLUCOSE: ICD-10-CM

## 2020-08-11 DIAGNOSIS — T45.1X5A PERIPHERAL NEUROPATHY DUE TO CHEMOTHERAPY (H): ICD-10-CM

## 2020-08-11 DIAGNOSIS — E78.2 MIXED HYPERLIPIDEMIA: ICD-10-CM

## 2020-08-11 DIAGNOSIS — G62.0 PERIPHERAL NEUROPATHY DUE TO CHEMOTHERAPY (H): ICD-10-CM

## 2020-08-14 ENCOUNTER — AMBULATORY - HEALTHEAST (OUTPATIENT)
Dept: NURSING | Facility: CLINIC | Age: 75
End: 2020-08-14

## 2020-08-14 ENCOUNTER — AMBULATORY - HEALTHEAST (OUTPATIENT)
Dept: LAB | Facility: CLINIC | Age: 75
End: 2020-08-14

## 2020-08-14 DIAGNOSIS — R73.09 OTHER ABNORMAL GLUCOSE: ICD-10-CM

## 2020-08-14 DIAGNOSIS — E78.2 MIXED HYPERLIPIDEMIA: ICD-10-CM

## 2020-08-14 DIAGNOSIS — Z01.30 BP CHECK: ICD-10-CM

## 2020-08-14 LAB
ALBUMIN SERPL-MCNC: 4.1 G/DL (ref 3.5–5)
ALP SERPL-CCNC: 60 U/L (ref 45–120)
ALT SERPL W P-5'-P-CCNC: 33 U/L (ref 0–45)
ANION GAP SERPL CALCULATED.3IONS-SCNC: 7 MMOL/L (ref 5–18)
AST SERPL W P-5'-P-CCNC: 29 U/L (ref 0–40)
BILIRUB SERPL-MCNC: 0.6 MG/DL (ref 0–1)
BUN SERPL-MCNC: 12 MG/DL (ref 8–28)
CALCIUM SERPL-MCNC: 9.2 MG/DL (ref 8.5–10.5)
CHLORIDE BLD-SCNC: 105 MMOL/L (ref 98–107)
CHOLEST SERPL-MCNC: 156 MG/DL
CO2 SERPL-SCNC: 28 MMOL/L (ref 22–31)
CREAT SERPL-MCNC: 0.82 MG/DL (ref 0.6–1.1)
FASTING STATUS PATIENT QL REPORTED: YES
GFR SERPL CREATININE-BSD FRML MDRD: >60 ML/MIN/1.73M2
GLUCOSE BLD-MCNC: 93 MG/DL (ref 70–125)
HDLC SERPL-MCNC: 55 MG/DL
LDLC SERPL CALC-MCNC: 90 MG/DL
POTASSIUM BLD-SCNC: 4.1 MMOL/L (ref 3.5–5)
PROT SERPL-MCNC: 6.2 G/DL (ref 6–8)
SODIUM SERPL-SCNC: 140 MMOL/L (ref 136–145)
TRIGL SERPL-MCNC: 55 MG/DL

## 2020-08-17 ENCOUNTER — COMMUNICATION - HEALTHEAST (OUTPATIENT)
Dept: FAMILY MEDICINE | Facility: CLINIC | Age: 75
End: 2020-08-17

## 2020-08-25 ENCOUNTER — OFFICE VISIT - HEALTHEAST (OUTPATIENT)
Dept: ONCOLOGY | Facility: CLINIC | Age: 75
End: 2020-08-25

## 2020-08-25 DIAGNOSIS — T45.1X5A PERIPHERAL NEUROPATHY DUE TO CHEMOTHERAPY (H): ICD-10-CM

## 2020-08-25 DIAGNOSIS — C50.411 MALIGNANT NEOPLASM OF UPPER-OUTER QUADRANT OF RIGHT BREAST IN FEMALE, ESTROGEN RECEPTOR NEGATIVE (H): ICD-10-CM

## 2020-08-25 DIAGNOSIS — G62.0 PERIPHERAL NEUROPATHY DUE TO CHEMOTHERAPY (H): ICD-10-CM

## 2020-08-25 DIAGNOSIS — Z17.1 MALIGNANT NEOPLASM OF UPPER-OUTER QUADRANT OF RIGHT BREAST IN FEMALE, ESTROGEN RECEPTOR NEGATIVE (H): ICD-10-CM

## 2020-11-16 ENCOUNTER — HOSPITAL ENCOUNTER (OUTPATIENT)
Dept: MAMMOGRAPHY | Facility: CLINIC | Age: 75
Setting detail: RADIATION/ONCOLOGY SERIES
Discharge: STILL ADMITTED OR ADMINISTRATIVE | End: 2020-11-16
Attending: INTERNAL MEDICINE

## 2020-11-16 DIAGNOSIS — Z17.1 MALIGNANT NEOPLASM OF UPPER-OUTER QUADRANT OF RIGHT BREAST IN FEMALE, ESTROGEN RECEPTOR NEGATIVE (H): ICD-10-CM

## 2020-11-16 DIAGNOSIS — C50.411 MALIGNANT NEOPLASM OF UPPER-OUTER QUADRANT OF RIGHT BREAST IN FEMALE, ESTROGEN RECEPTOR NEGATIVE (H): ICD-10-CM

## 2020-11-16 DIAGNOSIS — Z12.31 VISIT FOR SCREENING MAMMOGRAM: ICD-10-CM

## 2021-02-16 ENCOUNTER — OFFICE VISIT - HEALTHEAST (OUTPATIENT)
Dept: FAMILY MEDICINE | Facility: CLINIC | Age: 76
End: 2021-02-16

## 2021-02-16 ENCOUNTER — AMBULATORY - HEALTHEAST (OUTPATIENT)
Dept: NURSING | Facility: CLINIC | Age: 76
End: 2021-02-16

## 2021-02-16 DIAGNOSIS — G62.0 PERIPHERAL NEUROPATHY DUE TO CHEMOTHERAPY (H): ICD-10-CM

## 2021-02-16 DIAGNOSIS — Z17.1 MALIGNANT NEOPLASM OF UPPER-OUTER QUADRANT OF RIGHT BREAST IN FEMALE, ESTROGEN RECEPTOR NEGATIVE (H): ICD-10-CM

## 2021-02-16 DIAGNOSIS — C50.411 MALIGNANT NEOPLASM OF UPPER-OUTER QUADRANT OF RIGHT BREAST IN FEMALE, ESTROGEN RECEPTOR NEGATIVE (H): ICD-10-CM

## 2021-02-16 DIAGNOSIS — T45.1X5A PERIPHERAL NEUROPATHY DUE TO CHEMOTHERAPY (H): ICD-10-CM

## 2021-02-16 DIAGNOSIS — R73.09 OTHER ABNORMAL GLUCOSE: ICD-10-CM

## 2021-02-16 DIAGNOSIS — E78.2 MIXED HYPERLIPIDEMIA: ICD-10-CM

## 2021-02-16 DIAGNOSIS — E87.6 HYPOKALEMIA: ICD-10-CM

## 2021-02-16 LAB
ALBUMIN SERPL-MCNC: 3.9 G/DL (ref 3.5–5)
ALP SERPL-CCNC: 70 U/L (ref 45–120)
ALT SERPL W P-5'-P-CCNC: 25 U/L (ref 0–45)
ANION GAP SERPL CALCULATED.3IONS-SCNC: 7 MMOL/L (ref 5–18)
AST SERPL W P-5'-P-CCNC: 26 U/L (ref 0–40)
BILIRUB SERPL-MCNC: 0.5 MG/DL (ref 0–1)
BUN SERPL-MCNC: 13 MG/DL (ref 8–28)
CALCIUM SERPL-MCNC: 8.9 MG/DL (ref 8.5–10.5)
CHLORIDE BLD-SCNC: 108 MMOL/L (ref 98–107)
CHOLEST SERPL-MCNC: 157 MG/DL
CO2 SERPL-SCNC: 28 MMOL/L (ref 22–31)
CREAT SERPL-MCNC: 0.84 MG/DL (ref 0.6–1.1)
FASTING STATUS PATIENT QL REPORTED: YES
GFR SERPL CREATININE-BSD FRML MDRD: >60 ML/MIN/1.73M2
GLUCOSE BLD-MCNC: 102 MG/DL (ref 70–125)
HDLC SERPL-MCNC: 55 MG/DL
LDLC SERPL CALC-MCNC: 90 MG/DL
POTASSIUM BLD-SCNC: 3.9 MMOL/L (ref 3.5–5)
PROT SERPL-MCNC: 6 G/DL (ref 6–8)
SODIUM SERPL-SCNC: 143 MMOL/L (ref 136–145)
TRIGL SERPL-MCNC: 58 MG/DL

## 2021-02-16 RX ORDER — SIMVASTATIN 20 MG
20 TABLET ORAL AT BEDTIME
Qty: 90 TABLET | Refills: 1 | Status: SHIPPED | OUTPATIENT
Start: 2021-02-16 | End: 2022-02-10

## 2021-02-23 ENCOUNTER — OFFICE VISIT - HEALTHEAST (OUTPATIENT)
Dept: ONCOLOGY | Facility: CLINIC | Age: 76
End: 2021-02-23

## 2021-02-23 DIAGNOSIS — T45.1X5A PERIPHERAL NEUROPATHY DUE TO CHEMOTHERAPY (H): ICD-10-CM

## 2021-02-23 DIAGNOSIS — C50.411 MALIGNANT NEOPLASM OF UPPER-OUTER QUADRANT OF RIGHT BREAST IN FEMALE, ESTROGEN RECEPTOR NEGATIVE (H): ICD-10-CM

## 2021-02-23 DIAGNOSIS — Z12.31 ENCOUNTER FOR SCREENING MAMMOGRAM FOR MALIGNANT NEOPLASM OF BREAST: ICD-10-CM

## 2021-02-23 DIAGNOSIS — G62.0 PERIPHERAL NEUROPATHY DUE TO CHEMOTHERAPY (H): ICD-10-CM

## 2021-02-23 DIAGNOSIS — Z17.1 MALIGNANT NEOPLASM OF UPPER-OUTER QUADRANT OF RIGHT BREAST IN FEMALE, ESTROGEN RECEPTOR NEGATIVE (H): ICD-10-CM

## 2021-02-23 DIAGNOSIS — R03.0 WHITE COAT SYNDROME WITHOUT DIAGNOSIS OF HYPERTENSION: ICD-10-CM

## 2021-03-09 ENCOUNTER — AMBULATORY - HEALTHEAST (OUTPATIENT)
Dept: NURSING | Facility: CLINIC | Age: 76
End: 2021-03-09

## 2021-05-01 ENCOUNTER — HEALTH MAINTENANCE LETTER (OUTPATIENT)
Age: 76
End: 2021-05-01

## 2021-05-25 ENCOUNTER — RECORDS - HEALTHEAST (OUTPATIENT)
Dept: ADMINISTRATIVE | Facility: CLINIC | Age: 76
End: 2021-05-25

## 2021-05-26 VITALS — HEART RATE: 79 BPM | OXYGEN SATURATION: 98 % | DIASTOLIC BLOOD PRESSURE: 77 MMHG | SYSTOLIC BLOOD PRESSURE: 128 MMHG

## 2021-05-27 ENCOUNTER — RECORDS - HEALTHEAST (OUTPATIENT)
Dept: ADMINISTRATIVE | Facility: CLINIC | Age: 76
End: 2021-05-27

## 2021-05-27 NOTE — PROGRESS NOTES
PROGRESS NOTE   4/5/2019    SUBJECTIVE:  Alissa Jansen is a 73 y.o. female  who presents for   Chief Complaint   Patient presents with     Follow-up     Patient comes in today for follow-up of her hypercholesterolemia.  She continues on Zocor for her high cholesterol and that seems to be doing well.  She is not have any side effects to that medication.  She does need labs today to follow-up on her medication use.  Her blood pressure looks excellent today.  She denies that she is having any chest pain or shortness of breath or swelling of her extremities at all.  She does continue to have the neuropathy secondary to chemotherapy that came from breast cancer and there really is not a whole lot else that they can do for this.  She is frustrated by the neuropathy but is happy to be alive and not have breast cancer.  She is overall feeling otherwise well.  She does have a history of prediabetes as well as low potassium so we will check electrolyte panel to follow-up on that as well.  She knows that she is due for colonoscopy.  We discussed this at length.  We will place an order today for colonoscopy and someone from [place] [facility] should contact her.    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       cholecalciferol, vitamin D3, 1,000 unit tablet Take 2,000 Units by mouth daily.       multivitamin with minerals (THERA-M) 9 mg iron-400 mcg Tab tablet Take 1 tablet by mouth daily.  0      "OMEGA-3/DHA/EPA/FISH OIL (FISH OIL-OMEGA-3 FATTY ACIDS) 300-1,000 mg capsule Take 2 g by mouth daily.       simvastatin (ZOCOR) 20 MG tablet Take 1 tablet (20 mg total) by mouth at bedtime. 90 tablet 3     No current facility-administered medications for this visit.        No Known Allergies    Past Medical History:   Diagnosis Date     Breast cancer (H) 12/2015    Right Breast, lumpectomy     Closed fracture of lateral malleolus of right ankle 09/02/2004    fell in a [employer] golfing.     Colon polyps 9/9/2008    2 polyps found on colonoscopy.     H/O colonoscopy 01/2014    repeat 5 years     Hx antineoplastic chemotherapy 01/2016    Right Breast     Hx of radiation therapy 06/2016    Right Breast     Hyperlipidemia      Menopause     1996     Osteoarthritis        Past Surgical History:   Procedure Laterality Date     BREAST BIOPSY Right 2015    dx breast cancer     BREAST LUMPECTOMY Right 01/18/2016    Dr. Jerry.     BREAST LUMPECTOMY Right 07/06/2016    Re-excision lumpectomy and port removal, Dr. Jerry     CATARACT EXTRACTION W/  INTRAOCULAR LENS IMPLANT Bilateral Left 11/19/13, Right 12/3/13     DENTAL SURGERY  ~2010     KNEE SURGERY Left 1975    For \"torn ligaments\"     SENTINEL LYMPH NODE BIOPSY Right 01/18/2016    Dr. Jerry.       Social History     Tobacco Use   Smoking Status Never Smoker   Smokeless Tobacco Never Used   Tobacco Comment    No exposure       OBJECTIVE:     /84 (Patient Site: Left Arm, Patient Position: Sitting, Cuff Size: Adult Regular)   Pulse 84   Wt 177 lb (80.3 kg)   SpO2 99%   Breastfeeding? No   BMI 29.01 kg/m      Physical Exam:  GENERAL APPEARANCE: A&A, NAD, well hydrated, well nourished  SKIN:  Normal skin turgor, no lesions/rashes   CV: RRR, no M/G/R   LUNGS: CTAB  EXTREMITY: no swelling noted.  Full range of motion of all 4 extremities.   NEURO: no gross deficits   PSYCHIATRIC;  Mood appropriate, memory intact    LABS:     Recent Results (from the past 240 hour(s)) "   Comprehensive Metabolic Panel   Result Value Ref Range    Sodium 143 136 - 145 mmol/L    Potassium 4.2 3.5 - 5.0 mmol/L    Chloride 108 (H) 98 - 107 mmol/L    CO2 26 22 - 31 mmol/L    Anion Gap, Calculation 9 5 - 18 mmol/L    Glucose 97 70 - 125 mg/dL    BUN 10 8 - 28 mg/dL    Creatinine 0.77 0.60 - 1.10 mg/dL    GFR MDRD Af Amer >60 >60 mL/min/1.73m2    GFR MDRD Non Af Amer >60 >60 mL/min/1.73m2    Bilirubin, Total 0.4 0.0 - 1.0 mg/dL    Calcium 9.6 8.5 - 10.5 mg/dL    Protein, Total 6.5 6.0 - 8.0 g/dL    Albumin 3.8 3.5 - 5.0 g/dL    Alkaline Phosphatase 92 45 - 120 U/L    AST 23 0 - 40 U/L    ALT 18 0 - 45 U/L   Lipid Cascade   Result Value Ref Range    Cholesterol 192 <=199 mg/dL    Triglycerides 77 <=149 mg/dL    HDL Cholesterol 62 >=50 mg/dL    LDL Calculated 115 <=129 mg/dL    Patient Fasting > 8hrs? Yes        ASSESSMENT/PLAN:     Mixed hyperlipidemia [E78.2]      1. Mixed hyperlipidemia  - Comprehensive Metabolic Panel  - Lipid Cascade    2. Hypokalemia  - Comprehensive Metabolic Panel    3. Prediabetes  - Comprehensive Metabolic Panel    4. Screen for colon cancer  - Ambulatory referral for Colonoscopy    Patient overall seems to be doing well.  She is tolerating her Zocor which she is on for her high cholesterol without any difficulties.  She does not need a refill of her medication today but when she does she certainly will let me know.  Lipid panel as well as electrolytes were drawn today to follow-up on the hyperlipidemia as well as her history of hypo-kalemia as well as prediabetes.  Will contact her with results of the labs when they return.  When she needs a refill of her Zocor she certainly should let me know.  She overall otherwise is doing well.  Order was placed today for colonoscopy which she is due for.  Someone from [place] [facility] should contact her to get that appointment set up.  If she has difficulties or concerns or does not get called by [facility] she  certainly should let me know.  Otherwise we will see her on an as-needed basis or in 6 months for her next follow-up.  Christina Moore MD

## 2021-05-28 ENCOUNTER — RECORDS - HEALTHEAST (OUTPATIENT)
Dept: ADMINISTRATIVE | Facility: CLINIC | Age: 76
End: 2021-05-28

## 2021-05-29 ENCOUNTER — RECORDS - HEALTHEAST (OUTPATIENT)
Dept: ADMINISTRATIVE | Facility: CLINIC | Age: 76
End: 2021-05-29

## 2021-05-29 NOTE — PROGRESS NOTES
Pat is here today for ongoing management of breast cancer. Today is a 3 month f/u and OV with Dr. Mina. Ashly Swanson

## 2021-05-29 NOTE — PROGRESS NOTES
I met with Pat following her visit with Dr. Mina today.  She was ambulating without any device or assistance.  She said she is doing pretty well.  Denies needs.  Support and encouragement provided, invited calls.

## 2021-05-29 NOTE — PROGRESS NOTES
[facility] Progress Note    Patient: Alissa Jansen  MRN: [id number]  Date of Service: 05/24/2019      Reason for Visit    Follow-up of right-sided breast cancer.    Assessment and Plan  Breast cancer of upper-outer quadrant of right female breast    Primary site: Breast (Right)    Staging method: AJCC 7th Edition    Clinical: Stage IIA (T2, N0, cM0) signed by Kristy Mina MD on 1/27/2016  3:26 PM    Pathologic free text: Invasive ductal carcioma with metaplastic features.    Pathologic: Stage IIA (T2, N0, cM0) signed by Kristy Mina MD on 1/27/2016  3:25 PM    Summary: Stage IIA (T2, N0, cM0)    ECOG Performance   ECOG Performance Status: 1    Distress Assessment  Distress Assessment Score: 3(expresses stress about neuropathy) : She is frustrated with her neuropathy.  Emotional support was given.  Please see below.  Pain   : No complaints of pain.    Ms. Alissa Jansen is a 73 y.o. 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  She had a lumpectomy and sentinel lymph node procedure done by Dr. Jerry on 1/18/16.  Final pathology showed that the cancer was invasive ductal carcinoma with metaplastic features, grade 3, triple negative, with a 3 mm focus involving the inferior margin.  Final stage was Stage IIA (T2, N0, cM0).  She started chemotherapy on 2/5/16 and completed 4 cycles of dose dense AC chemotherapy.  She had 12 weeks of Taxol chemotherapy and then underwent reresection on 7/6/16.  There was no residual cancer seen in pathology.  She completed adjuvant irradiation on 9/6/16.    1.  She is doing well symptomatically except for the peripheral neuropathy.  I discussed with her that unfortunately the peripheral neuropathy is likely going to remain the same.  I have been watching the literature and there is really nothing  new out there available for a treatment of chronic peripheral neuropathy from chemotherapy.  I think his symptoms are likely to remain the same.  She voiced understanding.  She is trying to adjust to the current reality.     2.  She appears to be doing well from the breast cancer point of view.  I discussed with her that she is now 3 years out from completion of treatment with chemotherapy.  The chance of recurrence for a triple negative breast cancer is very low at this point.  She is glad to hear that.  At this point I think we can go to 6 monthly follow-up.  She is agreeable.    3.  She will be due for her annual mammogram in November.  This was ordered today.    4.  The DEXA scan that was done on 1/3/2019 showed moderate osteopenia with moderate fracture risk.  She is to continue with calcium and vitamin D tablets.  I encouraged her to continue with her exercise program.  We will plan on repeating a DEXA scan in 2021.    5.  Follow-up: Return to clinic after a mammogram in 6 months.  In the interim, if she notices anything concerning I asked her to call and she can be seen earlier.    Time spend >25 minutes face to face with the patient. More than 50 % in counseling and coordination of care.          Problem List    1. Malignant neoplasm of upper-outer quadrant of right breast in female, estrogen receptor negative (H)  Mammo Screening Bilateral   2. 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 Encounter for screening mammogram for malignant neoplasm of breast   Mammo Screening Bilateral        CC: Christina Moore MD             ______________________________________________________________________________    History of Present Illness    Ms. Alissa Jansen is here for follow-up alone.    She says that apart from the peripheral neuropathy, she feels that she is 200% normal.  The peripheral neuropathy still frustrates her greatly.  She has a tendency to drop objects but her fingers do not  trouble her much.  Because of the peripheral neuropathy causing weakness of her feet she finds it difficult to walk.  She does not fall anymore but it is difficult for her to walk fast and she feels that her balance is not good.  She is to be careful not to fall.  She feels that she is not able to take part in a lot of activities with her family because of this.  This leads to some amount of frustration.  She says that she does not like to exercise but still she goes religiously at least 2 times a week to the  to exercise.    Overall energy is good.  She has not noticed any lumps or bumps anywhere.  Weight has remained stable.  She does not have any aches or pains anywhere.    She remains physically and socially active.    No fevers or night sweats.  No unusual headaches.  No eyesight problems.  No mouth sores.  No swallowing difficulty.  No nausea or vomiting.  No shortness of breath.  No chest pain or cough.  No palpitation.  No abdominal pain.  No constipation or diarrhea.  No blood in stool or black stools.  No vaginal bleeding.  No difficulty with urination.  No skin rashes.    Please see below.  A 14 point review system is otherwise completely negative.    Pain Status  Currently in Pain: No/denies    Review of Systems    Constitutional  Constitutional (WDL): Exceptions to WDL  Fatigue: Fatigue relieved by rest  Neurosensory  Neurosensory (WDL): Exceptions to WDL  Peripheral Motor Neuropathy: Moderate symptoms, limiting instrumental ADL  Ataxia: Moderate symptoms, limiting instrumental ADL  Peripheral Sensory Neuropathy: Moderate symptoms, limiting instrumental ADL(hands and feet - stable; n/t)  Cardiovascular  Cardiovascular (WDL): All cardiovascular elements are within defined limits  Pulmonary  Respiratory (WDL): Within Defined Limits  Gastrointestinal  Gastrointestinal (WDL): All gastrointestinal elements are within defined limits  Genitourinary  Genitourinary (WDL): All genitourinary elements are within  "defined limits  Integumentary  Integumentary (WDL): All integumentary elements are within defined limits  Patient Coping  Patient Coping: Accepting  Distress Assessment  Distress Assessment Score: 3(expresses stress about neuropathy)  Accompanied by  Accompanied by: Alone    Past History  Past Medical History:   Diagnosis Date     Breast cancer (H) 12/2015    Right Breast, lumpectomy     Closed fracture of lateral malleolus of right ankle 09/02/2004    fell in a hole golfing.     Colon polyps 9/9/2008    2 polyps found on colonoscopy.     Colon polyps 05/17/2019    found on colonoscopy, adenomatous and sessile serrated adenoma, repeat colonscopy 3 yrs     H/O colonoscopy 01/2014    repeat 5 years     Hx antineoplastic chemotherapy 01/2016    Right Breast     Hx of radiation therapy 06/2016    Right Breast     Hyperlipidemia      Menopause     1996     Osteoarthritis          Past Surgical History:   Procedure Laterality Date     BREAST BIOPSY Right 2015    dx breast cancer     BREAST LUMPECTOMY Right 01/18/2016    Dr. Jerry.     BREAST LUMPECTOMY Right 07/06/2016    Re-excision lumpectomy and port removal, Dr. Jerry     CATARACT EXTRACTION W/  INTRAOCULAR LENS IMPLANT Bilateral Left 11/19/13, Right 12/3/13     COLONOSCOPY W/ POLYPECTOMY  05/16/2019     DENTAL SURGERY  ~2010     KNEE SURGERY Left 1975    For \"torn ligaments\"     SENTINEL LYMPH NODE BIOPSY Right 01/18/2016    Dr. Jerry.       Physical Exam    Recent Vitals 5/24/2019   Weight 179 lbs 14 oz   BSA (m2) 1.94 m2   /82   Pulse 84   Temp 98.2   Temp src 1   SpO2 97   Some recent data might be hidden       GENERAL: Alert and oriented to time place and person. Seated comfortably. In no distress.  She looks well overall.  Is about the same as in the last visit.    HEAD: Atraumatic and normocephalic.    EYES: BRITNI, EOMI.  No pallor.  No icterus.    Oral cavity: no mucosal lesion or tonsillar enlargement.    NECK: supple. JVP normal.  No thyroid " enlargement.    LYMPH NODES: No palpable, cervical, axillary or inguinal lymphadenopathy.    CHEST: clear to auscultation bilaterally.  Resonant to percussion throughout bilaterally.  Symmetrical breath movements bilaterally.    CVS: S1 and S2 are heard. Regular rate and rhythm.  No murmur or gallop or rub heard.  No peripheral edema.    ABDOMEN: Soft. Not tender. Not distended.  No palpable hepatomegaly or splenomegaly.  No other mass palpable.  Bowel sounds heard.    EXTREMITIES: Warm.    SKIN: no rash, or bruising or purpura.  Has a full head of hair.    BREASTS:  Right breast: Normal in contour..  Surgical scar is healed well.  No underlying induration.  Nipple looks normal.  Skin looks normal.  There is no palpable mass or tenderness in the right breast.  The right axilla is without mass or nodule.    Left breast: Contour looks normal.  There is no palpable mass.  No tenderness.  Nipple looks normal.  Skin looks normal.  Left axilla is without mass or tenderness.      Lab Results  No results found for this or any previous visit (from the past 168 hour(s)).      Imaging      No results found.      Signed by: Kristy Mina MD

## 2021-05-30 ENCOUNTER — RECORDS - HEALTHEAST (OUTPATIENT)
Dept: ADMINISTRATIVE | Facility: CLINIC | Age: 76
End: 2021-05-30

## 2021-05-30 VITALS — BODY MASS INDEX: 25.88 KG/M2 | WEIGHT: 157.9 LBS

## 2021-05-30 VITALS — BODY MASS INDEX: 26.71 KG/M2 | WEIGHT: 163 LBS

## 2021-05-30 NOTE — TELEPHONE ENCOUNTER
"Pat calls today to ask if Dr. Mina will renew her handicap parking pass for \"lifetime\" as it is expiring in September, and she doesn't expect her neuropathy to improve, which is the reason she has the pass. Will seek advisement from Dr. Mina, or PCP if her prefers. Ashly Swanson    "

## 2021-05-30 NOTE — TELEPHONE ENCOUNTER
I am okay with renewing it for the lifetime.  Please ask her to drop off the form at her convenience.    Kristy Mina MD

## 2021-05-30 NOTE — TELEPHONE ENCOUNTER
Returned call to Pat to update her on the parking pass. She expressed understanding and thanks to Dr. Mina. Ashly Swanson

## 2021-05-31 ENCOUNTER — RECORDS - HEALTHEAST (OUTPATIENT)
Dept: ADMINISTRATIVE | Facility: CLINIC | Age: 76
End: 2021-05-31

## 2021-05-31 VITALS — WEIGHT: 161 LBS | BODY MASS INDEX: 25.88 KG/M2 | HEIGHT: 66 IN

## 2021-05-31 VITALS — WEIGHT: 169.7 LBS | BODY MASS INDEX: 27.81 KG/M2

## 2021-05-31 VITALS — BODY MASS INDEX: 26.76 KG/M2 | WEIGHT: 166.5 LBS | HEIGHT: 66 IN

## 2021-05-31 VITALS — HEIGHT: 66 IN | WEIGHT: 170 LBS | BODY MASS INDEX: 27.32 KG/M2

## 2021-06-01 VITALS — BODY MASS INDEX: 28.94 KG/M2 | WEIGHT: 176.6 LBS

## 2021-06-01 VITALS — WEIGHT: 174.3 LBS | BODY MASS INDEX: 28.56 KG/M2

## 2021-06-01 VITALS — HEIGHT: 66 IN | WEIGHT: 176.7 LBS | BODY MASS INDEX: 28.4 KG/M2

## 2021-06-02 VITALS — WEIGHT: 178.8 LBS | BODY MASS INDEX: 29.3 KG/M2

## 2021-06-02 VITALS — BODY MASS INDEX: 29.45 KG/M2 | WEIGHT: 179.7 LBS

## 2021-06-02 VITALS — BODY MASS INDEX: 29.03 KG/M2 | WEIGHT: 177.13 LBS

## 2021-06-02 VITALS — BODY MASS INDEX: 29.01 KG/M2 | WEIGHT: 177 LBS

## 2021-06-03 VITALS — WEIGHT: 179.9 LBS | BODY MASS INDEX: 29.48 KG/M2

## 2021-06-03 NOTE — PROGRESS NOTES
[facility] Hematology and Oncology Progress Note    Patient: Alissa Jansen  MRN: [id number]  Date of Service: 11/20/2019      Reason for Visit    Follow-up of right-sided breast cancer.    Assessment and Plan  Breast cancer of upper-outer quadrant of right female breast    Primary site: Breast (Right)    Staging method: AJCC 7th Edition    Clinical: Stage IIA (T2, N0, cM0) signed by Kristy Mina MD on 1/27/2016  3:26 PM    Pathologic free text: Invasive ductal carcioma with metaplastic features.    Pathologic: Stage IIA (T2, N0, cM0) signed by Kristy Mina MD on 1/27/2016  3:25 PM    Summary: Stage IIA (T2, N0, cM0)    ECOG Performance   ECOG Performance Status: 0    Distress Assessment  Distress Assessment Score: No distress :     Pain   : No complaints of pain.    Ms. Alissa Jansen is a 74 y.o. 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  She had a lumpectomy and sentinel lymph node procedure done by Dr. Jerry on 1/18/16.  Final pathology showed that the cancer was invasive ductal carcinoma with metaplastic features, grade 3, triple negative, with a 3 mm focus involving the inferior margin.  Final stage was Stage IIA (T2, N0, cM0).  She started chemotherapy on 2/5/16 and completed 4 cycles of dose dense AC chemotherapy.  She had 12 weeks of Taxol chemotherapy and then underwent reresection on 7/6/16.  There was no residual cancer seen in pathology.  She completed adjuvant irradiation on 9/6/16.    1.  She mentions a new symptom of some epigastric discomfort that comes and goes.  This appears related to food intake.  Has relief with Tums.  This sounds like some gastritis or GERD.  She is however worried about this.  This is been going on for less than a month.  Given her history of triple negative breast cancer, I think we had to be vigilant.  I am  referring her to gastroenterology to consider for an upper endoscopy.  Meanwhile I think she can continue using Tums as needed.    2.  She is still very much frustrated about the peripheral neuropathy that is left over from the chemotherapy.  I gave her emotional support.  Unfortunately there is really not much more that we can do to help her.  I discussed with her that no new treatments are available new.  She again asked about going for stem cell treatment.  I told her that there is really no proven so far that works.  What can be done is to adjust her lifestyle around her disability from the neuropathy.    3.  Apart from the upper abdominal discomfort there is really nothing new in symptoms.  There is nothing new found on physical examination also.  She did have her annual mammogram done on 11/13/2019.  That did not show anything suspicious.  I reviewed the findings with her.  I discussed with her that she is now 4 years out from diagnosis of the triple negative breast cancer.  Overall I think she is doing well.    4.  She asked about having a 3D mammogram for the next year.  I told her that certainly we can order the next mammogram in that way for the fall 2020.    5.  She has already received seasonal flu vaccine.    6.  For now return to clinic with MD or NP in 6 months.  She will call if she has any new symptoms in the interim.    Time spend >25 minutes face to face with the patient. More than 50 % in counseling and coordination of care.          Problem List    1. Malignant neoplasm of upper-outer quadrant of right breast in female, estrogen receptor negative (H)  Ambulatory referral to Gastroenterology   2. 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 Epigastric pain  Ambulatory referral to Gastroenterology        CC: Christina Moore MD             ______________________________________________________________________________    History of Present Illness    Ms. Alissa Jansen is here for  a follow-up alone.    She says that she remains physically active.  She feels that overall she is doing well but she is still very much frustrated from the peripheral neuropathy symptoms that are knocked out from chemotherapy.  She says that usually she does not have much pain from that.  Mostly what she has is numbness and tingling in her feet.  This makes it difficult for her to walk distances.  She feels a slightly unsteady on her feet.  She says that she went golfing and she had a fall.  Sometimes at night she has some charley horses in her legs.  She will need to stand up to make it go away.  She finds this very frustrating.  Her family usually goes to [facility] around this time and she cannot go with them because she cannot walk long distances.  She is very much disappointed about that.    Otherwise she says that the only new symptom that she has is some upper abdominal discomfort.  She mentions this almost in passing.  She says that it is mild.  She does not have any burning sensation coming up into her chest.  No water brash.  The discomfort comes and goes.  She feels that it is something like a gastritis aggravated by some foods but she has not identified which foods cause more problems.  She has found that taking Tums helps with the pain.    Her blood pressure is also running a bit high today she believes it is from anxiety about meeting with me.    No fevers.  No night sweats.  No lumps or bumps anywhere.  No unusual headaches.  No eyesight problems.  No mouth sores.  No swallowing difficulty.  No shortness of breath.  No chest pain or cough.  No constipation or diarrhea.  No blood in stool or black stools.  No difficulty with urination.  No vaginal discharge.  No skin rashes.    Please see below.  A 14 point review of system is otherwise completely negative.    Pain Status  Currently in Pain: No/denies    Review of Systems    Constitutional  Constitutional (WDL): Exceptions to WDL  Fatigue: None  Fever:  None  Chills: None  Weight Gain: 5 - <10% from baseline(5#)  Weight Loss: None  Neurosensory  Neurosensory (WDL): Exceptions to WDL  Peripheral Motor Neuropathy: Asymptomatic, clinical or diagnostic observations only, intervention not indicated(hands, feet)  Ataxia: Asymptomatic, clinical or diagnostic observations only, intervention not indicated  Peripheral Sensory Neuropathy: Asymptomatic, loss of deep tendon reflexes or paresthesia  Confusion: None  Syncope: None  Cardiovascular  Cardiovascular (WDL): All cardiovascular elements are within defined limits  Pulmonary  Respiratory (WDL): Within Defined Limits  Gastrointestinal  Gastrointestinal (WDL): Exceptions to WDL  Anorexia: None  Constipation: None  Diarrhea: None  Dysphagia: None  Esophagitis: Asymptomatic, clinical or diagnostic observations only, intervention not indicated(persistent x one month, Tums helped)  Nausea: None  Pharyngitis: None  Vomiting: None  Dysgeusia: None  Dry Mouth: None  Genitourinary  Genitourinary (WDL): All genitourinary elements are within defined limits  Integumentary  Integumentary (WDL): All integumentary elements are within defined limits  Patient Coping  Patient Coping: Open/discussion  Distress Assessment  Distress Assessment Score: No distress  Accompanied by       Past History  Past Medical History:   Diagnosis Date     Breast cancer (H) 12/2015    Right Breast, lumpectomy     Closed fracture of lateral malleolus of right ankle 09/02/2004    fell in a hole golfing.     Colon polyps 9/9/2008    2 polyps found on colonoscopy.     Colon polyps 05/17/2019    found on colonoscopy, adenomatous and sessile serrated adenoma, repeat colonscopy 3 yrs     H/O colonoscopy 01/2014    repeat 5 years     Hx antineoplastic chemotherapy 01/2016    Right Breast     Hx of radiation therapy 06/2016    Right Breast     Hyperlipidemia      Menopause     1996     Osteoarthritis          Past Surgical History:   Procedure Laterality Date      "BREAST BIOPSY Right 2015    dx breast cancer     BREAST LUMPECTOMY Right 01/18/2016    Dr. Jerry.     BREAST LUMPECTOMY Right 07/06/2016    Re-excision lumpectomy and port removal, Dr. Jerry     CATARACT EXTRACTION W/  INTRAOCULAR LENS IMPLANT Bilateral Left 11/19/13, Right 12/3/13     COLONOSCOPY W/ POLYPECTOMY  05/16/2019     DENTAL SURGERY  ~2010     KNEE SURGERY Left 1975    For \"torn ligaments\"     SENTINEL LYMPH NODE BIOPSY Right 01/18/2016    Dr. Jerry.       Physical Exam    Recent Vitals 11/20/2019   Weight 184 lbs 6 oz   BSA (m2) 1.97 m2   /91   Pulse 81   Temp 98.2   Temp src 1   SpO2 96   Some recent data might be hidden       GENERAL: Alert and oriented to time place and person. Seated comfortably. In no distress.  She looks well overall.  A bit emotional as before.  She has gained a bit of weight.    HEAD: Atraumatic and normocephalic.    EYES: BRITNI, EOMI.  No pallor.  No icterus.    Oral cavity: no mucosal lesion or tonsillar enlargement.    NECK: supple. JVP normal.  No thyroid enlargement.    LYMPH NODES: No palpable, cervical, axillary or inguinal lymphadenopathy.    CHEST: clear to auscultation bilaterally.  Resonant to percussion throughout bilaterally.  Symmetrical breath movements bilaterally.    CVS: S1 and S2 are heard. Regular rate and rhythm.  No murmur or gallop or rub heard.  No peripheral edema.    ABDOMEN: Soft. Not tender. Not distended.  No palpable hepatomegaly or splenomegaly.  No other mass palpable.  Bowel sounds heard.    EXTREMITIES: Warm.    SKIN: no rash, or bruising or purpura.  Has a full head of hair.    BREASTS:  Right breast: Normal in control.  Surgical scar has healed well.  No underlying induration.  Nipple looks normal.  Skin looks normal.  There is no palpable mass or tenderness in the right breast.  The right axilla is without mass or nodule.    Left breast: Contour looks normal.  No palpable mass.  No tenderness.  Nipple looks normal.  Skin looks normal.  " Left axilla is without mass or nodule.        Lab Results  No results found for this or any previous visit (from the past 168 hour(s)).      Imaging      Mammo Screening Bilateral    Result Date: 11/14/2019  BILATERAL FULL FIELD DIGITAL SCREENING MAMMOGRAM WITH TOMOSYNTHESIS Performed on: 11/13/19 Compared to: 11/05/2018 Mammo Screening Bilateral, 12/18/2017 Mammo Screening Bilateral, 12/14/2016 Mammo Diagnostic Bilateral, 12/21/2015 Mammo Post Clip Placement Right, 12/18/2015 Mammo Diagnostic Right, and 12/17/2015 Mammo Screening Bilateral Findings: The breasts have scattered areas of fibroglandular densities. There is no radiographic evidence of malignancy. There are benign-appearing calcifications. Benign changes of conservation therapy are noted on the right. This study was evaluated with the assistance of Computer-Aided Detection. Breast Tomosynthesis was used in interpretation. Repeat routine screening mammogram in one year is recommended. ACR BI-RADS Category 2: Benign        Signed by: Kristy Mina MD

## 2021-06-03 NOTE — PROGRESS NOTES
I called the patient on her cell phone and I discussed the CT scan results with her.  I discussed with her that we are not seeing any evidence of cancer recurrence.  She is very glad to hear that.  I explained to her that the only thing that we see is a small hiatal hernia.  I think that is what is likely giving her the upper abdominal pain.  I advised her that she should keep the appointment with gastroenterology and have an upper endoscopy done as we discussed earlier.  She voiced understanding.  She will be calling to make that appointment.    Kristy Mina MD

## 2021-06-04 VITALS
WEIGHT: 176.8 LBS | HEART RATE: 94 BPM | BODY MASS INDEX: 29.42 KG/M2 | TEMPERATURE: 98.2 F | OXYGEN SATURATION: 98 % | DIASTOLIC BLOOD PRESSURE: 92 MMHG | SYSTOLIC BLOOD PRESSURE: 152 MMHG

## 2021-06-04 VITALS
WEIGHT: 171 LBS | HEIGHT: 65 IN | DIASTOLIC BLOOD PRESSURE: 82 MMHG | OXYGEN SATURATION: 99 % | HEART RATE: 89 BPM | BODY MASS INDEX: 28.49 KG/M2 | SYSTOLIC BLOOD PRESSURE: 130 MMHG

## 2021-06-04 VITALS
TEMPERATURE: 98.2 F | OXYGEN SATURATION: 96 % | SYSTOLIC BLOOD PRESSURE: 152 MMHG | DIASTOLIC BLOOD PRESSURE: 100 MMHG | WEIGHT: 184.4 LBS | BODY MASS INDEX: 30.22 KG/M2 | HEART RATE: 81 BPM

## 2021-06-04 NOTE — TELEPHONE ENCOUNTER
I called Kylah back and relayed the message from Dr Mina. Kylah is not having abdominal pain recently. She states she is feeling better but will set up endoscopy thru gastroenterology. Alissa Vinson RN

## 2021-06-04 NOTE — TELEPHONE ENCOUNTER
Pt called after having ct done last week which showed a hiatal hernia. Pt wants to know if a endoscopy is really necessary? She prefers not but if Dr Mina thinks she needs it she will need a referral. Alissa Vinson RN

## 2021-06-04 NOTE — TELEPHONE ENCOUNTER
Anushka, she already has a referral and she has met with gastroenterology already.  If she has persistent pain, I think it is a good idea for her to have the EGD.  She needs to only call back to the gastroenterology clinic to schedule it.  That is really up to her.    Kristy Mina MD

## 2021-06-05 VITALS
BODY MASS INDEX: 28.96 KG/M2 | SYSTOLIC BLOOD PRESSURE: 128 MMHG | WEIGHT: 174 LBS | HEART RATE: 88 BPM | OXYGEN SATURATION: 98 % | DIASTOLIC BLOOD PRESSURE: 62 MMHG

## 2021-06-05 VITALS
BODY MASS INDEX: 28.96 KG/M2 | SYSTOLIC BLOOD PRESSURE: 173 MMHG | OXYGEN SATURATION: 98 % | HEART RATE: 82 BPM | TEMPERATURE: 98.7 F | DIASTOLIC BLOOD PRESSURE: 93 MMHG | WEIGHT: 174 LBS

## 2021-06-05 NOTE — PROGRESS NOTES
Assessment and Plan:     ASSESSMENT: 74 y.o. female here for annual wellness visit.    PLAN:     Encounter for general adult medical examination with abnormal findings [Z00.01]    1. Encounter for general adult medical examination with abnormal findings    2. Mixed hyperlipidemia  - Comprehensive Metabolic Panel  - Lipid Cascade    3. Malignant neoplasm of upper-outer quadrant of right breast in female, estrogen receptor negative (H)    4. Osteopenia    5. Prediabetes    6. Benign neoplasm of colon, unspecified part of colon      Patient is a 74 y.o. female who is overall doing well.  She overall feels like she is doing well.  She will continue on her Zocor for her hyperlipidemia.  We will contact her with the results of the metabolic panel and lipid panel that were drawn today to follow-up on that.  When she needs a refill of the Zocor she will let me know.  She seems to be relatively stable in terms of her breast cancer.  She does have a history of osteopenia and is due for another bone density test in a year.  She does had a mammogram in November 2019.  She is due for colonoscopy in 2022 as she does have history of polyps in her colon.  Her blood pressure initially was a bit elevated when she came but on recheck it was okay.  We will need to monitor this carefully.  Patient is aware of the need to monitor her blood pressure carefully and if she finds that her blood pressure is high she certainly should let me know.  Patient declined full exam today.  She will talk to her insurance company regarding getting a shingles vaccination.  She is up-to-date on her other immunizations.  All of her questions and concerns were addressed today.  If she has additional problems or concerns should let me know.    Will contact her with the results of the labs when available.        The patient's current medical problems were reviewed.    I have had an Advance Directives discussion with the patient.  The following health  maintenance schedule was reviewed with the patient and provided in printed form in the after visit summary:   Health Maintenance   Topic Date Due     ZOSTER VACCINES (2 of 3) 08/05/2011     MEDICARE ANNUAL WELLNESS VISIT  12/20/2017     FALL RISK ASSESSMENT  04/05/2020     MAMMOGRAM  11/13/2020     DXA SCAN  01/03/2021     TD 18+ HE  04/14/2021     ADVANCE CARE PLANNING  12/20/2021     COLONOSCOPY  05/17/2022     LIPID  04/05/2024     PNEUMOCOCCAL IMMUNIZATION 65+ LOW/MEDIUM RISK  Completed     INFLUENZA VACCINE RULE BASED  Completed        Subjective:   Chief Complaint: Alissa Jansen is an 74 y.o. female here for an Annual Wellness visit.     HPI: Patient overall feels like she is doing well.  She has history of increased cholesterol as well as breast cancer osteopenia prediabetes and polyps in her colon.  She overall feels like things are doing well.  She continues on Zocor for her high cholesterol but is on no other medications other than vitamins.  She feels like overall things are doing well.  She has been on Prilosec now for a couple of months from the gastroenterologist.  She is having some issues with her reflux but that seems to be improving.  She just stopped the medication recently and hopefully will have improvement in her symptoms.  That is what the gastroenterologist had hoped for and if it is not the case she will let me know.  She is up-to-date with all of her preventive services having had a bone density test on 1/13/2019.  She had a mammogram on 11/13/2019 and she had a colon anoscopy on 5/16/2019.  She needs to repeat her colonoscopy in 3 years because of a history of polyps in her colon.  Her breast cancer seems to be relatively stable.  Her blood pressure initially when she was first roomed was a little elevated 149/80 but on recheck was 130/82.  Her blood pressure has been elevated on a couple of different occasions when she seen other specialist but not on a consistent basis.  We did  talk about shingles vaccination.  I discussed with her that it would be recommended that she have that.  It is a series of 2 shots 1 now and 1 2 to 6 months later.  I urged her to contact her insurance company to see about coverage for the shingles vaccination.  She does have an advanced directive.  Her daughter is her spokesperson.  I looked at our chart and we have a partial advanced directive scanned in but we do not have the full advanced directive.  She will bring a copy of it for us so that we make sure that we get the whole document in the chart.        Patient Care Team:  Christina Moore MD as PCP - General (Family Medicine)  Lombardi, Susan L, RN as RN, Care Manager  Kristy Mina MD as Physician (Hematology and Oncology)  Rosenda Bethea MD as Physician (Radiation Oncology)  Christina Moore MD as Assigned PCP  Reggie Osorio, CNP as Nurse Practitioner (Hematology and Oncology)     Patient Active Problem List   Diagnosis     Benign Adenomatous Polyp Of The Large Intestine     Prediabetes     Osteopenia     Mixed hyperlipidemia     Malignant neoplasm of upper-outer quadrant of right breast in female, estrogen receptor negative (H)     Hypokalemia     Difficulty coping with disease     Peripheral neuropathy due to chemotherapy (H)     Health education/counseling     Epigastric pain     Past Medical History:   Diagnosis Date     Breast cancer (H) 12/2015    Right Breast, lumpectomy     Closed fracture of lateral malleolus of right ankle 09/02/2004    fell in a hole golfing.     Colon polyps 9/9/2008    2 polyps found on colonoscopy.     Colon polyps 05/17/2019    found on colonoscopy, adenomatous and sessile serrated adenoma, repeat colonscopy 3 yrs     H/O colonoscopy 01/2014    repeat 5 years     Hx antineoplastic chemotherapy 01/2016    Right Breast     Hx of radiation therapy 06/2016    Right Breast     Hyperlipidemia      Menopause     1996     Osteoarthritis       Past Surgical  "History:   Procedure Laterality Date     BREAST BIOPSY Right     dx breast cancer     BREAST LUMPECTOMY Right 2016    Dr. Jerry.     BREAST LUMPECTOMY Right 2016    Re-excision lumpectomy and port removal, Dr. Jerry     CATARACT EXTRACTION W/  INTRAOCULAR LENS IMPLANT Bilateral Left 13, Right 12/3/13     COLONOSCOPY W/ POLYPECTOMY  2019     DENTAL SURGERY  ~     KNEE SURGERY Left     For \"torn ligaments\"     SENTINEL LYMPH NODE BIOPSY Right 2016    Dr. Jerry.      Family History   Problem Relation Age of Onset     Breast cancer Paternal Aunt         age in 70's     Heart disease Father         \"Enlarged heart and liver\",  age 48, not much info.      Thyroid disease Sister      No Medical Problems Daughter       Social History     Socioeconomic History     Marital status: Single     Spouse name: Not on file     Number of children: 1     Years of education: Not on file     Highest education level: Not on file   Occupational History     Occupation: Retired     Comment: Banker.   Social Needs     Financial resource strain: Not on file     Food insecurity:     Worry: Not on file     Inability: Not on file     Transportation needs:     Medical: Not on file     Non-medical: Not on file   Tobacco Use     Smoking status: Never Smoker     Smokeless tobacco: Never Used     Tobacco comment: No exposure   Substance and Sexual Activity     Alcohol use: Yes     Alcohol/week: 1.7 standard drinks     Types: 2 Standard drinks or equivalent per week     Drug use: No     Sexual activity: Not Currently   Lifestyle     Physical activity:     Days per week: Not on file     Minutes per session: Not on file     Stress: Not on file   Relationships     Social connections:     Talks on phone: Not on file     Gets together: Not on file     Attends Confucianist service: Not on file     Active member of club or organization: Not on file     Attends meetings of clubs or organizations: Not on file     " "Relationship status: Not on file     Intimate partner violence:     Fear of current or ex partner: Not on file     Emotionally abused: Not on file     Physically abused: Not on file     Forced sexual activity: Not on file   Other Topics Concern     Not on file   Social History Narrative    Never .  One daughter, two grandchildren.  Forced [employer] from banking in Fall 2009.      Current Outpatient Medications   Medication Sig Dispense Refill     calcium carbonate-vitamin D3 (CALCIUM 600 + D,3,) 600 mg calcium- 200 unit cap Take by mouth.       cholecalciferol, vitamin D3, 1,000 unit tablet Take 2,000 Units by mouth daily.       multivitamin with minerals (THERA-M) 9 mg iron-400 mcg Tab tablet Take 1 tablet by mouth daily.  0     OMEGA-3/DHA/EPA/FISH OIL (FISH OIL-OMEGA-3 FATTY ACIDS) 300-1,000 mg capsule Take 2 g by mouth daily.       omeprazole (PRILOSEC) 20 MG capsule TAKE 1 CAPSULE BY MOUTH ONCE DAILY 30 MINUTES TO 1 HOUR BEFORE A MEAL.       simvastatin (ZOCOR) 20 MG tablet Take 1 tablet (20 mg total) by mouth at bedtime. 90 tablet 2     No current facility-administered medications for this visit.       Objective:   Vital Signs:   Visit Vitals  /80   Pulse 89   Ht 5' 5\" (1.651 m)   Wt 171 lb (77.6 kg)   SpO2 99%   BMI 28.46 kg/m         VisionScreening:  No exam data present     REVIEW OF SYSTEMS:  Denies fever, chills, visual changes, fatigue, myalgias, nasal congestion, rhinorrhea, ear pain or discharge, sore throat, swollen glands, breast mass, nipple discharge, breast changes, abdominal pain, nausea, vomiting, diarrhea, constipation, cough, shortness of breath, chest pain, weight change, change in bowel habits, melena, rectal bleeding, dysuria, frequency, urgency, hematuria, polyuria, polydipsia, polyphagia, joint pain or swelling or erythema, edema, rash, weakness, paresthesias, vaginal discharge or bleeding or mood changes.  Remainder of review of systems was negative.      PHYSICAL " "EXAM:  On exam, patient is a WD, WN 74 y.o. female in NAD.  /82   Pulse 89   Ht 5' 5\" (1.651 m)   Wt 171 lb (77.6 kg)   SpO2 99%   BMI 28.46 kg/m    Head normocephalic, atraumatic.  Lungs clear to auscultation  Heart regular rate and rhythm.  Patient declined any other physical exam besides listening to her heart and lungs.      Assessment Results 2/7/2020   Activities of Daily Living No help needed   Instrumental Activities of Daily Living No help needed   Mini Cog Total Score 5   Some recent data might be hidden     A Mini-Cog score of 0-2 suggests the possibility of dementia, score of 3-5 suggests no dementia    Identified Health Risks:     The patient was counseled and encouraged to consider modifying their diet and eating habits. She was provided with information on recommended healthy diet options.  She is at risk for falling and has been provided with information to reduce the risk of falling at home.  Patient's advanced directive was discussed and I am comfortable with the patient's wishes.        "

## 2021-06-05 NOTE — TELEPHONE ENCOUNTER
Refill Approved    Rx renewed per Medication Renewal Policy. Medication was last renewed on 11/2/18.    Lili Alarcon, [facility] Triage/Med Refill 1/20/2020     Requested Prescriptions   Pending Prescriptions Disp Refills     simvastatin (ZOCOR) 20 MG tablet 90 tablet 3     Sig: Take 1 tablet (20 mg total) by mouth at bedtime.       Statins Refill Protocol (Hmg CoA Reductase Inhibitors) Passed - 1/20/2020 11:06 AM        Passed - PCP or prescribing provider visit in past 12 months      Last office visit with prescriber/PCP: 4/5/2019 Christina Moore MD OR same dept: 4/5/2019 Christina Moore MD OR same specialty: 4/5/2019 Christina Moore MD  Last physical: 1/19/2018 Last MTM visit: Visit date not found   Next visit within 3 mo: Visit date not found  Next physical within 3 mo: Visit date not found  Prescriber OR PCP: Christina Mooer MD  Last diagnosis associated with med order: 1. Mixed hyperlipidemia  - simvastatin (ZOCOR) 20 MG tablet; Take 1 tablet (20 mg total) by mouth at bedtime.  Dispense: 90 tablet; Refill: 3    If protocol passes may refill for 12 months if within 3 months of last provider visit (or a total of 15 months).

## 2021-06-05 NOTE — TELEPHONE ENCOUNTER
FYI - Status Update  Who is Calling: Patient  Update: Patient stated she is out of her simvastatin.  Okay to leave a detailed message?:  No

## 2021-06-08 NOTE — PROGRESS NOTES
"ACUPUNCTURIST TREATMENT NOTE    Name: Alissa Jansen  :  1945  MRN:  [id number]    Acupuncture Treatment  Patient Type: WW CCC  Intervention Reason: Neuropathy, Neuropathy 2  Pre-session Neuropathy rating: 3  Post-session Neuropathy ratin  Pre-session Neuropathy 2 ratin  Post-session Neuropathy 2 ratin  Patient complaint:: weakness in (R) leg; neuropathy in fingertips and feet  Acupunture (Points):: Du 20, SSC, LI 10, SI 3, SI Lamine, LI 4, St 36, GB 34, Ki 7, St 41, Ba Lamine  Practitioner Observed: 30 minute treatment  Checklist: Progress Note Completed, Consent Reveiwed    \"Risks and benefits of acupuncture were discussed with patient. Consent for treatment was given. We thank you for the referral.\"     Paris Avendano L.Ac.    Date:  1/3/2017  Time:  3:34 PM    " - - -

## 2021-06-08 NOTE — TELEPHONE ENCOUNTER
Patient needs a preop exam prior to being able to complete this form.  We are working on getting her an appointment later today for a preop exam here at the [facility].

## 2021-06-08 NOTE — TELEPHONE ENCOUNTER
It sounds like they are asking for her to have a preop prior to her oral surgery on Friday.    Please assist patient with scheduling a preop exam with one of the providers either Wednesday or Thursday.     I was able to review the form that was faxed to me from the oral surgery office.  It appears that she needs a preop exam prior to having this procedure done.  The procedure is actually scheduled for tomorrow and not Friday so we will go ahead and get her scheduled for a preop exam today with Benigno Villagran.

## 2021-06-08 NOTE — TELEPHONE ENCOUNTER
Who is calling:  Kaitlyn From [facility] Oral surgery calling   Reason for Call: Needs to have an okay or order to let them know that she is okay to have oral surgery with oral sedation. She is having Tooth # 3 Upper extracted on Friday this week at 8:30 am. 6/19.  They would like to send a document over for Dr Christina Moore to sign and fax it back to them at [phone]. But first, they need to get a fax number to send it to so someone can give it to Dr. Christina Moore to look at and sign ASAP. I  Date of last appointment with primary care:N/A  Okay to leave a detailed message: Yes with the Fax number to fax the form to us. She needs our Fax number for Dr. Moore. Thank you! Call Kaitlyn on her cell at [phone]

## 2021-06-08 NOTE — TELEPHONE ENCOUNTER
Received fax from [facility] Oral surgery on patient      Placed in Dr. Moore's inbox      06/17/20

## 2021-06-08 NOTE — PROGRESS NOTES
"ACUPUNCTURIST TREATMENT NOTE    Name: Alissa Jansen  :  1945  MRN:  [id number]    Acupuncture Treatment  Patient Type: WW CCC  Intervention Reason: Neuropathy, Neuropathy 2  Pre-session Neuropathy rating: 3  Post-session Neuropathy ratin  Pre-session Neuropathy 2 ratin  Post-session Neuropathy 2 ratin  Patient complaint:: neuropathy in fingers 3/10 and feet 5/10; (R) leg drop foot  Acupunture (Points):: (L) scalp sensory line; LI 10, SI 3, LI 4, Ba Aspen, St 36, St 40, St 41, Ba Lamine, Cathryn 6  Practitioner Observed: 30 minute treatment  Checklist: Progress Note Completed, Consent Reveiwed    \"Risks and benefits of acupuncture were discussed with patient. Consent for treatment was given. We thank you for the referral.\"     Paris Avendano L.Ac.    Date:  2017  Time:  2:25 PM    "

## 2021-06-08 NOTE — PROGRESS NOTES
"Alissa Jansen is a 74 y.o. female who is being evaluated via a billable telephone visit.      The patient has been notified of following:     \"This telephone visit will be conducted via a call between you and your physician/provider. We have found that certain health care needs can be provided without the need for a physical exam.  This service lets us provide the care you need with a short phone conversation.  If a prescription is necessary we can send it directly to your pharmacy.  If lab work is needed we can place an order for that and you can then stop by our lab to have the test done at a later time.    Telephone visits are billed at different rates depending on your insurance coverage. During this emergency period, for some insurers they may be billed the same as an in-person visit.  Please reach out to your insurance provider with any questions.    If during the course of the call the physician/provider feels a telephone visit is not appropriate, you will not be charged for this service.\"    Patient has given verbal consent to a Telephone visit? Yes    What phone number would you like to be contacted at? **[phone]*    Patient would like to receive their AVS by AVS Preference: Mail a copy.    Additional provider notes:  [facility] Hematology and Oncology Progress Note    Patient: Alissa Jansen  MRN: [id number]  Date of Service: 05/20/2020      Reason for Visit    Follow-up of right-sided breast cancer.    Stage:    Breast cancer of upper-outer quadrant of right female breast    Primary site: Breast (Right)    Staging method: AJCC 7th Edition    Clinical: Stage IIA (T2, N0, cM0) signed by Kristy Mina MD on 1/27/2016  3:26 PM    Pathologic free text: Invasive ductal carcioma with metaplastic features.    Pathologic: Stage IIA (T2, N0, cM0) signed by Kristy Mina MD on 1/27/2016  3:25 PM    Summary: Stage IIA (T2, N0, cM0)    ECOG Performance   ECOG Performance Status: 1    Distress " Assessment  Distress Assessment Score: No distress :     Pain   : No complaints of pain.    Ms. Alissa Jansne is a 74 y.o. 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  She had a lumpectomy and sentinel lymph node procedure done by Dr. Jerry on 1/18/16.  Final pathology showed that the cancer was invasive ductal carcinoma with metaplastic features, grade 3, triple negative, with a 3 mm focus involving the inferior margin.  Final stage was Stage IIA (T2, N0, cM0).  She started chemotherapy on 2/5/16 and completed 4 cycles of dose dense AC chemotherapy.  She had 12 weeks of Taxol chemotherapy and then underwent reresection on 7/6/16.  There was no residual cancer seen in pathology.  She completed adjuvant irradiation on 9/6/16.    Subjective:  She states that overall she is doing well.  She really does not have any complaints except that her peripheral neuropathy which causes weakness of her feet is still the same.  However she states that her neighbor told her that she is walking a little bit better and that makes her feel encouraged.    She has not noticed any lumps or bumps anywhere.  No aches or pains.  Energy is good.  Breathing is fine.  No chest pain or cough.  No nausea or vomiting or abdominal pain.  No constipation or diarrhea.  No blood in stool or black stools.  No vaginal discharge.  No difficulty with urination.  No skin rashes.    Constitutional  Constitutional (WDL): All constitutional elements are within defined limits  Weight Loss: to <10% from baseline, intervention not indicated(intentional)  Neurosensory  Neurosensory (WDL): Exceptions to WDL  Peripheral Motor Neuropathy: Asymptomatic, clinical or diagnostic observations only, intervention not indicated  Peripheral Sensory Neuropathy: Asymptomatic, loss of deep tendon reflexes or  paresthesia  Eye   Eye Disorder (WDL): All eye disorder elements are within defined limits  Ear  Ear Disorder (WDL): All ear disorder elements are within defined limits  Cardiovascular  Cardiovascular (WDL): Exceptions to WDL  Edema: Yes  Edema Limbs: 5 - 10% inter-limb discrepancy in volume or circumference at point of greatest visible difference, swelling or obscuration of anatomic architecture on close inspection(right foot)  Pulmonary  Respiratory (WDL): Within Defined Limits  Gastrointestinal  Gastrointestinal (WDL): All gastrointestinal elements are within defined limits  Genitourinary  Genitourinary (WDL): All genitourinary elements are within defined limits  Lymphatic  Lymph (WDL): All lymph disorder elements are within defined limits  Musculoskeletal and Connective Tissue  Musculoskeletal and Connetive Tissue Disorders (WDL): All Musculoskeletal and Connetive Tissue Disorder elements are within defined limits  Integumentary  Integumentary (WDL): All integumentary elements are within defined limits  Patient Coping  Patient Coping: Accepting;Open/discussion  Distress Assessment  Distress Assessment Score: No distress  Accompanied by     Oral Chemo Adherence     A&P:    1. Malignant neoplasm of upper-outer quadrant of right breast in female, estrogen receptor negative (H)  Mammo Screening Bilateral   2. Peripheral neuropathy due to chemotherapy (H)       1.  From the breast cancer point of view overall she appears to be doing well.  She would like to be seen in September after having her annual mammogram done for a physical examination.  I will ask for these to be scheduled.  Meanwhile I discussed with her that if she starts having any suspicious symptoms she can call and I can see her even earlier.    2.  Peripheral neuropathy symptoms remain about the same.  This is due to chemotherapy.  Unfortunately this is likely to remain without any change at this point.  She understands that.  Encouraged her to remain  active.    3.  She asked whether she should continue taking the omega-3 fatty acids.  I discussed with her about the slight benefit from taking the supplement.  I pointed out to her that it has very little side effects.  If she does not feel like taking it, that is fine.  If she wants to continue with that, that is also fine.  She voiced understanding.    4.  Follow-up: As mentioned above, mammogram in September and follow-up with me after that for history and physical.    Phone call duration: >15 minutes    Start time: 13.45  End Time: 14.11    Kristy Mina MD      CC:  Christina Moore MD

## 2021-06-08 NOTE — PROGRESS NOTES
[facility] Hematology and Oncology Progress Note    Patient: Alissa Jansen  MRN: [id number]  Date of Service: 02/02/2017      Reason for Visit    Follow-up of right-sided breast cancer.    Assessment and Plan  Breast cancer of upper-outer quadrant of right female breast    Primary site: Breast (Right)    Staging method: AJCC 7th Edition    Clinical: Stage IIA (T2, N0, cM0) signed by Kristy Mina MD on 1/27/2016  3:26 PM    Pathologic free text: Invasive ductal carcioma with metaplastic features.    Pathologic: Stage IIA (T2, N0, cM0) signed by Kristy Mina MD on 1/27/2016  3:25 PM    Summary: Stage IIA (T2, N0, cM0)    ECOG Performance   ECOG Performance Status: 1    Distress Assessment  Distress Assessment Score: No distress : Please see below.  Pain   : None.    Ms. Alissa Jansen is a 71 y.o. 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  She had a lumpectomy and sentinel lymph node procedure done by Dr. Jerry on 1/18/16.  Final pathology showed that the cancer was invasive ductal carcinoma with metaplastic features, grade 3, triple negative, with a 3 mm focus involving the inferior margin.  Final stage was Stage IIA (T2, N0, cM0).  She started chemotherapy on 2/5/16 and completed 4 cycles of dose dense AC chemotherapy.  She had 12 weeks of Taxol chemotherapy and then underwent reresection on 7/6/16.  There was no residual cancer seen in pathology.  She completed adjuvant irradiation on 9/6/16.    1.  Overall she appears to be doing well.  At this time she does not give any symptoms of signs of breast cancer recurrence.  She had a mammogram done on 12/14/16 which showed only postoperative changes.    2.  She had a DEXA scan done on 12/22/16.  It showed osteopenia.  She has been started on calcium and vitamin D.  I encouraged her to continue walking.   This is being handled by her PCP.  I think she should have a repeat DEXA scan in 2 years time.    3.  Peripheral neuropathy after chemotherapy continues to be a problem.  However she has seen distinct progress in the last few months.  She has completed physical therapy.  She continues with accupuncture.  She is thinking of stopping the acupuncture in about a month's time and seeing whether it makes a difference.  She still has some tingling and pins and needles sensation which is irritating in her foot.  However this is not very troublesome now.  I think this is the effect of Cymbalta.  We discussed about whether to continue Cymbalta and decided to continue because the symptoms continue.  I have given her a new prescription for Cymbalta 90 mg by mouth daily.  I told her that I expect to see some more improvement in the coming months.  I reminded her to continue taking the multivitamin tablet.    4.  She will need a screening mammogram done in December 2017.    5.  She asked me to talk to her daughter Nishi over her phone and update her.  I did so.  She was happy to hear that her mother is doing well.    6.  I asked her to come back and see me or Reggie Osorio NP in approximately 3 months time according to her convenience.    Time spend >25 minutes face to face with the patient. More than 50 % in counseling and coordination of care.      Problem List    1. Breast cancer of upper-outer quadrant of right female breast     2. Peripheral neuropathy due to chemotherapy  DULoxetine (CYMBALTA) 30 MG capsule        CC: Brisa Solorzano MD          ______________________________________________________________________________    History of Present Illness    Ms. Alissa Jansen is here for follow-up alone.  She says that overall she is feeling better.  She feels that the peripheral neuropathy is improving.  She still has some numbness.  Has occasional pins and needles sensation.  She feels that the strength is  improving.  She is more cold and walking around.  She says that she does not use a walker or cane inside her home usually.  She does use a walker at night when she wakes up.  Outside the house she mostly uses a cane.  She is able to get around on her own.  She is trying to walk more and more.    She has not noticed any lumps or bumps anywhere.  No new aches or pains.  Weight has remained stable.  Appetite is very good.  No unusual headaches.  No vision problems.  No mouth sores.  No difficulty breathing.  No chest pain or cough.  No nausea or vomiting or abdominal pain.  No constipation or diarrhea.  No blood in stool or black stools.  No vaginal bleeding.  No difficulty passing urine.  No skin rashes.    Please see below.  A 14 point review of systems is otherwise completely negative.      Pain Status  Currently in Pain: No/denies    Review of Systems    Constitutional  Constitutional (WDL): All constitutional elements are within defined limits  Fatigue: None  Fever: None  Chills: None  Weight Gain: None  Weight Loss: None  Neurosensory  Neurosensory (WDL): Exceptions to WDL  Peripheral Motor Neuropathy: Asymptomatic, clinical or diagnostic observations only, intervention not indicated  Ataxia: Asymptomatic, clinical or diagnostic observations only, intervention not indicated  Peripheral Sensory Neuropathy: Asymptomatic, loss of deep tendon reflexes or paresthesia  Confusion: None  Syncope: None  Cardiovascular  Cardiovascular (WDL): All cardiovascular elements are within defined limits  Pulmonary  Respiratory (WDL): Within Defined Limits  Gastrointestinal  Gastrointestinal (WDL): All gastrointestinal elements are within defined limits  Anorexia: None  Constipation: None  Diarrhea: None  Dysphagia: None  Esophagitis: None  Nausea: None  Pharyngitis: None  Vomiting: None  Dysgeusia: None  Dry Mouth: None  Genitourinary  Genitourinary (WDL): All genitourinary elements are within defined  "limits  Integumentary  Integumentary (WDL): All integumentary elements are within defined limits  Pruritus: Mild or localized, topical intervention indicated (back itches)  Patient Coping  Patient Coping: Accepting  Distress Assessment  Distress Assessment Score: No distress  Accompanied by  Accompanied by: Alone    Past History  Past Medical History:   Diagnosis Date     Closed fracture of lateral malleolus of right ankle 09/02/2004    fell in [place] golfing.     Colon polyps 9/9/2008    2 polyps found on colonoscopy.     Hx antineoplastic chemotherapy 01/2016    Right Breast     Hx of radiation therapy 06/2016    Right Breast     Menopause     1996     Osteoarthritis          Past Surgical History:   Procedure Laterality Date     BREAST CYST ASPIRATION       BREAST LUMPECTOMY Right 01/18/2016    Dr. Jerry.     BREAST LUMPECTOMY Right 07/06/2016    Re-excision lumpectomy and port removal, Dr. Jerry     CATARACT EXTRACTION W/  INTRAOCULAR LENS IMPLANT Bilateral Left 11/19/13, Right 12/3/13     DENTAL SURGERY  ~2010     KNEE SURGERY  1975    For \"torn ligaments\"     SENTINEL LYMPH NODE BIOPSY Right 01/18/2016    Dr. Jerry.       Physical Exam    Recent Vitals 2/2/2017   Weight 157 lbs 14 oz   /81   Pulse 88   Temp 97.7   Temp src 1   SpO2 97       GENERAL: Alert and oriented. Seated comfortably. In no distress.    HEAD: Atraumatic and normocephalic.  Has a full head of hair.    EYES: BRITNI, EOMI.  No pallor.  No icterus.    Oral cavity: no mucosal lesion or tonsillar enlargement.    NECK: supple. JVP normal.  No thyroid enlargement.    LYMPH NODES: No palpable, cervical, axillary or inguinal lymphadenopathy.    CHEST: clear to auscultation bilaterally.  Resonant to percussion throughout bilaterally.  Symmetrical breath movements bilaterally.    CVS: S1 and S2 are heard. Regular rate and rhythm.  No murmur or gallop or rub heard.    ABDOMEN: Soft. Not tender. Not distended.  No palpable hepatomegaly or " splenomegaly.  No other mass palpable.  Bowel sounds heard.    EXTREMITIES: Warm.  No peripheral edema.    SKIN: no rash, or bruising or purpura.    GAIT: Much improved.  She is able to walk unassisted by a cane.  However she still steps high recurrence I think she still has a bit of a foot drop.    BREASTS:  Right breast: Normal in contour.  The surgical scars are healed well.  Nipple looks normal.  Skin looks normal.  No palpable mass or tenderness in the right breast.  The right axilla is without mass or nodule.    Left breast: Contour looks normal.  The skin looks normal.  The nipple looks normal.  No palpable mass.  No tenderness.  The left axilla is without mass or tenderness.        Lab Results  No results found for this or any previous visit (from the past 168 hour(s)).    Imaging    No results found.      Signed by: Kristy Mina MD

## 2021-06-08 NOTE — TELEPHONE ENCOUNTER
Left message to call back for: Pt.  Information to relay to patient:  Pt needs a pre-op scheduled for today for her tooth procedure for tomorrow. I will try home number as well.    Jaimee Stanford, CMA

## 2021-06-08 NOTE — PROGRESS NOTES
"ACUPUNCTURIST TREATMENT NOTE    Name: Alissa Jansen  :  1945  MRN:  [id number]    Acupuncture Treatment  Patient Type: WW CCC  Intervention Reason: Neuropathy, Neuropathy 2  Pre-session Neuropathy rating: 3  Post-session Neuropathy ratin  Pre-session Neuropathy 2 ratin  Post-session Neuropathy 2 ratin  Patient complaint:: neuropathy in fingers and feet; (R) leg drop foot  Acupunture (Points):: (L) scalp sensory line, leg motor sensory line; Du 20, Ba Aspen, SI 3, LI 4, St 36, GB 34-peroneus tertius MP with e-stim; ; peroneus longus MP-Cathryn 3 with e-stim; Amrit Raman  Practitioner Observed: 30 minute treatment  Checklist: Progress Note Completed, Consent Reveiwed    \"Risks and benefits of acupuncture were discussed with patient. Consent for treatment was given. We thank you for the referral.\"     Paris Avendano L.Ac.    Date:  2017  Time:  1:37 PM    "

## 2021-06-08 NOTE — TELEPHONE ENCOUNTER
Reached pt at her home number. Previous message given. Pt states that she is not having sedation done, and only will have novacain done. She states that she does not need a preop done.     [facility] in [place] contacted and confirmed information above.     The fax received can only be filled out if pt comes in for a pre-op appointment. Alessio Lisa states that they would like to have this form on hand just in case pt decides to switch from novacain to sedation and they can move on with the procedure. I notified pt of this information and she declines preop appointment. She just wants to get her procedure done. She states that she will not switch from novacain to sedation. Pt notified that if she does decide to switch then she will need to reschedule dental procedure per [facility] since they do not have preop on file. Pt verified understanding.     Fax shredded.     Jaimee Stanford, CMA

## 2021-06-08 NOTE — PROGRESS NOTES
Brief check in with Pat as she was checking in for her appt with Dr. Mina today.  She was using a cane instead of her walker and seemed to be moving smoother and easier.  She agreed walking was going a bit better.  She denied needing any services, support and encouragement provided, invited calls.

## 2021-06-08 NOTE — PROGRESS NOTES
"ACUPUNCTURIST TREATMENT NOTE    Name: Alissa Jansen  :  1945  MRN:  [id number]    Acupuncture Treatment  Patient Type: WW CCC  Intervention Reason: Neuropathy  Pre-session Neuropathy ratin  Post-session Neuropathy ratin  Patient complaint:: Neuropathy of feet, (R) drop foot  Acupunture (Points):: Ba addis, Kid 1, Sp 3, St 40, [place], (L) scalp motor line x4  Practitioner Observed: 30 minute treatment.  Checklist: Progress Note Completed, Consent Reveiwed    \"Risks and benefits of acupuncture were discussed with patient. Consent for treatment was given. We thank you for the referral.\"     Hubert Swanson    Date:  2017  Time:  11:52 AM    "

## 2021-06-08 NOTE — TELEPHONE ENCOUNTER
Fax needs approval and signature. Placed in Dr. Moore's box. Patient is being seen for oral surgery tomorrow morning so this needs approval and sent over asap.     Lorena Gan, CMA

## 2021-06-08 NOTE — PROGRESS NOTES
"ACUPUNCTURIST TREATMENT NOTE    Name: lAissa Jansen  :  1945  MRN:  [id number]    Acupuncture Treatment  Patient Type: WW CCC  Intervention Reason: Neuropathy  Pre-session Neuropathy ratin  Post-session Neuropathy ratin  Patient complaint:: neuropathy in feet; (R) leg drop foot  Acupunture (Points):: HTJJ L4-BL 31 with e-stim; HTJJ L5, BL 32, Soleus MP x3; Peroneus brevis and longus MP  Practitioner Observed: 30 minute treatment  Checklist: Progress Note Completed, Consent Reveiwed    \"Risks and benefits of acupuncture were discussed with patient. Consent for treatment was given. We thank you for the referral.\"     Paris Avendano L.Ac.    Date:  2017  Time:  3:29 PM    "

## 2021-06-08 NOTE — PROGRESS NOTES
"ACUPUNCTURIST TREATMENT NOTE    Name: Alissa Jansen  :  1945  MRN:  [id number]    Acupuncture Treatment  Patient Type: WW CCC  Intervention Reason: Neuropathy  Pre-session Neuropathy ratin  Post-session Neuropathy ratin  Pre-session Neuropathy 2 ratin  Post-session Neuropathy 2 ratin  Patient complaint:: Neuropathy of feet, (R) foot drop  Acupunture (Points):: (R) St 36, 37, 40, 41, Kid 1, 2, SP 3, 4, Ba addis, (R) [place], (R) Peroneus longus motor point, (L) scalp motor line x2  Practitioner Observed: 30 minute treatment.  Checklist: Progress Note Completed, Consent Reveiwed    \"Risks and benefits of acupuncture were discussed with patient. Consent for treatment was given. We thank you for the referral.\"     Hubert Swanson    Date:  1/10/2017  Time:  11:27 AM    "

## 2021-06-08 NOTE — PROGRESS NOTES
"ACUPUNCTURIST TREATMENT NOTE    Name: Alissa Jansen  :  1945  MRN:  [id number]    Acupuncture Treatment  Patient Type: WW CCC  Intervention Reason: Neuropathy, Neuropathy 2  Pre-session Neuropathy rating: 3  Post-session Neuropathy ratin  Pre-session Neuropathy 2 ratin  Post-session Neuropathy 2 ratin  Patient complaint:: neuropathy in fingers 3/10 and feet 6/10; (R) drop foot  Acupunture (Points):: (L) scalp motor line; Du 20, LI 3, SI 3, LI 10, GB 34-Cathryn 3 with e-stim, St 36-St 40 with e-stim, St 41, Ba Lamine, Ki 1, Sp 4, Ki 2  Practitioner Observed: 30 minute treatment  Checklist: Progress Note Completed, Consent Reveiwed    \"Risks and benefits of acupuncture were discussed with patient. Consent for treatment was given. We thank you for the referral.\"     Paris Avendano L.Ac.    Date:  2017  Time:  11:47 AM    "

## 2021-06-09 NOTE — PROGRESS NOTES
"ACUPUNCTURIST TREATMENT NOTE    Name: Alissa Jansen  :  1945  MRN:  [id number]    Acupuncture Treatment  Patient Type: WW CCC  Intervention Reason: Neuropathy  Pre-session Neuropathy ratin  Post-session Neuropathy ratin  Patient complaint:: Neuropathy of feet, (R) drop foot  Acupunture (Points):: Carlotta Ravi @L4, 5, (R) Bl 31, 32(R) St 36, 40, 41. Cathryn 6, Kid 3, Gastrocnemius motor point x2, Soleus motor point x2, (R) Tibialis motor point, (R) Peroneus tertius motor point  Practitioner Observed: 30 minute treatment.  Checklist: Progress Note Completed, Consent Reveiwed    \"Risks and benefits of acupuncture were discussed with patient. Consent for treatment was given. We thank you for the referral.\"     Hubert Swanson    Date:  3/28/2017  Time:  12:10 PM    "

## 2021-06-09 NOTE — PROGRESS NOTES
[facility] Radiation Oncology Follow Up Note    Patient: Alissa Jansen  MRN: [id number]  Date of Service: 04/06/2017    Assessment / Impression     1. Breast cancer of upper-outer quadrant of right female breast        ECOG Peformance Status  ECOG Performance Status: 1  Distress Assessment Score  Distress Assessment Score: No distress  Body site: Breast     Plan:   F/u prn only  Good ROM. No evidence of disease  Neuropathy better, but still wide based gait.   Face to face time  25 minutes with > 75% spent on consultation, education and coordination of care.    Subjective:      HPI: Alissa Jansen is a 71 y.o. female with     Alissa Jansen was treated with 3D conformal radiation therapy for a UOQ right breast triple negative cancer. S/p adjuvant chemo with significant neuropathy. Complete pathologic response.       SITE TREATED: right breast  TOTAL DOSE: 6040cGy  NUMBER OF FRACTIONS: 33  DATES COMPLETED: 9/6/2016  CONCURRENT CHEMOTHERAPY: No  ADJUVANT THERAPY:No Triple negative       Alissa tolerated the treatment without unexpected side effects. Still has considerable neuropathy but otherwise skin irritation and fatigue have resolved.       Chief Complaint   Patient presents with     HE Cancer     Breast Cancer   .    Current Outpatient Prescriptions   Medication Sig Dispense Refill     calcium carbonate-vitamin D3 (CALCIUM 600 + D,3,) 600 mg calcium- 200 unit cap Take by mouth.       cholecalciferol, vitamin D3, 1,000 unit tablet Take 2,000 Units by mouth daily.       DULoxetine (CYMBALTA) 30 MG capsule Take 3 capsules (90 mg total) by mouth daily. 270 capsule 1     multivitamin with minerals (THERA-M) 9 mg iron-400 mcg Tab tablet Take 1 tablet by mouth daily.  0     OMEGA-3/DHA/EPA/FISH OIL (FISH OIL-OMEGA-3 FATTY ACIDS) 300-1,000 mg capsule Take 2 g by mouth daily.       simvastatin (ZOCOR) 20 MG tablet Take 1 tablet (20 mg total) by mouth bedtime. 90 tablet 3     No current  facility-administered medications for this visit.        The following portions of the patient's history were reviewed and updated as appropriate: allergies, current medications, past family history, past medical history, past social history, past surgical history and problem list.    Review of Systems    Constitutional  Constitutional (WDL): All constitutional elements are within defined limits  Neurosensory  Neurosensory (WDL): Exceptions to WDL  Peripheral Sensory Neuropathy: Severe symptoms, limiting self care ADL  Eye        Eye Disorder (WDL): All eye disorder elements are within defined limits  Ear     Cardiovascular  Cardiovascular (WDL): All cardiovascular elements are within defined limits  Pulmonary  Respiratory (WDL): Within Defined Limits  Gastrointestinal  Gastrointestinal (WDL): All gastrointestinal elements are within defined limits  Genitourinary  Genitourinary (WDL): All genitourinary elements are within defined limits              Musculoskeletal              Musculoskeletal and Connetive Tissue Disorders (WDL): All Musculoskeletal and Connetive Tissue Disorder elements are within defined limits  Integumentary  Integumentary (WDL): All integumentary elements are within defined limits  Patient Coping  Patient Coping: Accepting  Pain              Currently in Pain: No/denies  Accompanied by  Accompanied by: Alone    Objective:     Physical Exam    Vitals:    04/06/17 1005   BP: 133/76   Pulse: 85   Temp: 97.6  F (36.4  C)   TempSrc: Oral   SpO2: 96%   Weight: 163 lb (73.9 kg)        /76  Pulse 85  Temp 97.6  F (36.4  C) (Oral)   Wt 163 lb (73.9 kg)  SpO2 96%  BMI 26.71 kg/m2  General appearance: alert, appears stated age and cooperative  Head: Normocephalic, without obvious abnormality, atraumatic  Eyes: negative findings: conjunctivae and sclerae normal  Neck: no adenopathy and supple, symmetrical, trachea midline  Back: symmetric, no curvature. ROM normal. No CVA tenderness.  Lungs:  clear to auscultation bilaterally  Breasts: normal appearance, no masses or tenderness, full rom   Heart: regular rate and rhythm  Skin: Skin color, texture, turgor normal. No rashes or lesions  Lymph nodes: Cervical, supraclavicular, and axillary nodes normal.  Neurologic: Grossly normal    Recent Labs: No results found for this or any previous visit (from the past 168 hour(s)).    Imaging: Imaging results 30 days: No results found.    Signed by: Rosenda Bethea MD

## 2021-06-09 NOTE — PROGRESS NOTES
"ACUPUNCTURIST TREATMENT NOTE    Name: Alissa Jansen  :  1945  MRN:  [id number]    Acupuncture Treatment  Patient Type: WW CCC  Intervention Reason: Neuropathy  Pre-session Neuropathy ratin  Post-session Neuropathy ratin  Patient complaint:: Neuropathy of feet, (R) drop foot  Acupunture (Points):: St 40, Sp 6, Kid 3, Ba addis, Cathryn 6, (R) Tibialis motor point, (R) Peroneus tertius motor point, Bl 31, 32, [place] @L5, Soleus motor point x3, Gastrocnemius motor point x2  Practitioner Observed: 30 minute treatment.  Checklist: Progress Note Completed, Consent Reveiwed    \"Risks and benefits of acupuncture were discussed with patient. Consent for treatment was given. We thank you for the referral.\"     Hubert Swanson    Date:  2017  Time:  10:39 AM    "

## 2021-06-09 NOTE — PROGRESS NOTES
Brief check in with Pat today as she was waiting to see Dr. Bethea for fu.  She said all is going well.  She continues to work to help her neuropathy get better.  Support provided, invited calls.

## 2021-06-09 NOTE — PROGRESS NOTES
"ACUPUNCTURIST TREATMENT NOTE    Name: Alissa Jansen  :  1945  MRN:  [id number]    Acupuncture Treatment  Patient Type: WW CCC  Intervention Reason: Neuropathy  Pre-session Neuropathy ratin  Post-session Neuropathy ratin  Patient complaint:: neuropathy in feet; (R) leg drop foot  Acupunture (Points):: HTJJ at L5; Shiqizhuixia, BL 31, BL 32, Soleus MP x3; BL 60, BL 65, Ki 1; Peroneus longus, brevis, and tertius MP, Ba Lamine; extensor hallicus MP extensor digitorum MP  Practitioner Observed: 30 minute treatment  Checklist: Progress Note Completed, Consent Reveiwed    \"Risks and benefits of acupuncture were discussed with patient. Consent for treatment was given. We thank you for the referral.\"     Paris Avendano L.Ac.    Date:  2017  Time:  2:45 PM    "

## 2021-06-09 NOTE — PROGRESS NOTES
"ACUPUNCTURIST TREATMENT NOTE    Name: Alissa Jansen  :  1945  MRN:  [id number]    Acupuncture Treatment  Patient Type: WW CCC  Intervention Reason: Neuropathy  Pre-session Neuropathy ratin  Post-session Neuropathy ratin  Patient complaint:: neuropathy in feet; (R) leg drop foot  Acupunture (Points):: Du 20, SSC, Si Lamine, Ht 8, LI 4, SI 3, St 36-Tibialis anterior MP with e-stim; Peroneous longus-tertius MP with e-stim, peroneous brevis MP; Cathryn 3, Sp 4, Sp 6  Practitioner Observed: 30 minute treatment  Checklist: Progress Note Completed, Consent Reveiwed    \"Risks and benefits of acupuncture were discussed with patient. Consent for treatment was given. We thank you for the referral.\"     Paris Avendano L.Ac.    Date:  2017  Time:  3:35 PM    "

## 2021-06-09 NOTE — PROGRESS NOTES
"ACUPUNCTURIST TREATMENT NOTE    Name: Alissa Jansen  :  1945  MRN:  [id number]    Acupuncture Treatment  Patient Type: WW CCC  Intervention Reason: Neuropathy  Pre-session Neuropathy ratin  Post-session Neuropathy ratin  Patient complaint:: Neuropathy of feet, (R) drop foot  Acupunture (Points):: Ba addis, Kid 1, 2, Sp 3, Gb 41, St 40, Cathryn 6, Tibialis anterior motor point, (R) Peroneus motor point, (R) peroneus motor point  Practitioner Observed: 30 minute treatment.  Checklist: Progress Note Completed, Consent Reveiwed    \"Risks and benefits of acupuncture were discussed with patient. Consent for treatment was given. We thank you for the referral.\"     Hubert Swanson    Date:  3/21/2017  Time:  12:04 PM    "

## 2021-06-09 NOTE — PROGRESS NOTES
"ACUPUNCTURIST TREATMENT NOTE    Name: Alissa Jansen  :  1945  MRN:  [id number]    Acupuncture Treatment  Patient Type: WW CCC  Intervention Reason: Neuropathy  Pre-session Neuropathy ratin  Post-session Neuropathy ratin  Patient complaint:: neuropathy in feet with (R) drop foot  Acupunture (Points):: St 36-tibialis anterior MP with e-stim; peroneous longus-peroneous tertius MP with e-stim, Sp 6, Cathryn 3, Ba Lamine, medial soleous MP  Practitioner Observed: 30 minute treatment  Checklist: Progress Note Completed, Consent Reveiwed    \"Risks and benefits of acupuncture were discussed with patient. Consent for treatment was given. We thank you for the referral.\"     Paris Avendano L.Ac.    Date:  3/14/2017  Time:  2:11 PM    "

## 2021-06-09 NOTE — PROGRESS NOTES
"ACUPUNCTURIST TREATMENT NOTE    Name: Alissa Jansen  :  1945  MRN:  [id number]    Acupuncture Treatment  Patient Type: WW CCC  Intervention Reason: Neuropathy  Pre-session Neuropathy ratin  Post-session Neuropathy ratin  Patient complaint:: neuropathy in feet and (R) drop foot  Acupunture (Points):: HTJJ L4-L5 with e-stim; shiqizhuixia, BL 31, BL 32, soleous MP x3; peroneous longus MP, peroneous tertius MP-Cathryn 3 with e-stim; St 36-tibialis anterior MP with e-stim; Ba Lamine  Practitioner Observed: 30 minute treatment  Checklist: Progress Note Completed, Consent Reveiwed    \"Risks and benefits of acupuncture were discussed with patient. Consent for treatment was given. We thank you for the referral.\"     Paris Avendano L.Ac.    Date:  3/7/2017  Time:  3:46 PM    "

## 2021-06-09 NOTE — PROGRESS NOTES
I met with Pat today following her consult with Reggie Osorio.  I gave her the handout, Life after breast cancer, talking of when treatment ends.  She said she is doing as good as can be expected with her neuropathy.  She continues to work hard at PT and is having acupuncture and laser treatments to her feet to see if that helps.  She is no longer using her walker, has a goal to be done with her cane by [place].  She talked of the frustrating nature of dealing with her neuropathy.  Listening support and encouragement provided.  I told her she has come a long way in the past couple of months, mostly due to her hard work and diligence and grit.  She feels she is too young to be so disabled, that is what is driving her.  I invited her to the [employer] [place] Workshop and gave her two crane card and two breast  Cancer survivor pins, one for her daughter, Madhavi.  Support provided, invited calls.

## 2021-06-09 NOTE — PROGRESS NOTES
[facility] Hematology and Oncology Progress Note    Patient: Alissa Jansen  MRN: [id number]  Date of Service: 3/7/2017         Reason for Visit:    Review of Survivorship Care Plan.    Assessment and Plan:    1)  Breast cancer of upper-outer quadrant of right female breast     Primary site: Breast (Right)     Clinical: Stage IIA (T2, N0, cM0)     Invasive ductal carcioma with metaplastic features.     Pathologic: Stage IIA (T2, N0, cM0)      Summary: Stage IIA (T2, N0, cM0)     Triple negative  71 y.o.   2015, December 21 - core needle biopsy (+) grade 3, invasive ductal carcinoma  2016, January 18 - lumpectomy and sentinel lymph node procedure with Dr. Jerry.  Final pathology showed invasive ductal carcinoma with metaplastic features, grade 3, triple negative, with a 3 mm focus involving the inferior margin.      In view of the high risk features:     02/05/16 - 03/18/16 completed 4 cycles dose-dense AC chemotherapy followed by Neulasta.    03/30/16 MUGA 70.0 % (71 % left ventricular EF in February).     04/01/16 - 06/20/16 completed 12 weekly doses adjuvant Taxol chemotherapy.    07/06/16 - right breast reexcison lumpectomy (negative margins) and port-a-cath removal.    09/06/16 completed 6040 cGy / 33 fx EBRT    02/02/17 - last follow up - clinically and radiographically ANANTH.  12/14/16 bilateral mammogram - benign findings.     03/06/17 - I reviewed her Survivorship Care Plan document with her; including her team of providers, diagnosis, surgery, adjuvant chemotherapy and breast conservation EBRT.  Potential short-term and long term complications as well as routine monitoring were reviewed as well.  She had no residual questions.  She intends to give the document to her daughter for safe keeping.    05/04/17 - next scheduled follow up.    Screening mammogram due in December 2017.    2) Neuropathies and paresthesias:    Improving - she no longer uses a walker, but still uses a cane.  Doesn't drop things  "nearly as much.  Would expect continued improvement over the next months.     Taxol - induced.    Manifest as finger tips feeling like they've \"been in water and feel rough\".  When handles things they at times are dropped.  She notes numbness with her feet, but no pain.      Continue Cymbalta 90 mg by mouth daily.    Continue with acupuncture.    Also received lazar therapy through her chiropractor.     3) Osteopenia:    12/22/16 DEXA scan.    Continue calcium 1000 mg daily and vitamin D 2000iu daily.      Encouraged consistent ambulation.     Recommend repeat DEXA scan in 2 years time.    ECOG Performance @ 0-1    Distress Assessment - no apparent distress.        > 15 minutes with > 50 % in counseling and coordination of care.    Reggie Osorio, CNP     CC: MD Sabina Sepulveda MD  ______________________________________________________________________________    Interim History:    Ms. Alissa Jansen presents to review her Survivorship Care Plan.  She is in good spirits, stating she feels great other than her lingering neuropathies.   She does admit they are improving with time away from treatment, but are still very frustrating.  Her appetite is good and weight quite stable.  She denies pain, cough, fever, chills, headache, visual or mentation disturbance, bowel or bladder issues.  She was just recently seen in scheduled follow up and all looked well.    Past Histories:    Past Medical History:   Diagnosis Date     Closed fracture of lateral malleolus of right ankle 09/02/2004    fell in [place].     Colon polyps 9/9/2008    2 polyps found on colonoscopy.     Hx antineoplastic chemotherapy 01/2016    Right Breast     Hx of radiation therapy 06/2016    Right Breast     Menopause     1996     Osteoarthritis          Past Surgical History:   Procedure Laterality Date     BREAST CYST ASPIRATION       BREAST LUMPECTOMY Right 01/18/2016    Dr. Jerry.     BREAST LUMPECTOMY Right 07/06/2016    " "Re-excision lumpectomy and port removal, Dr. Jerry     CATARACT EXTRACTION W/  INTRAOCULAR LENS IMPLANT Bilateral Left 11/19/13, Right 12/3/13     DENTAL SURGERY  ~2010     KNEE SURGERY  1975    For \"torn ligaments\"     SENTINEL LYMPH NODE BIOPSY Right 01/18/2016    Dr. Jerry.       Physical Exam:    Recent Vitals 2/2/2017   Weight 157 lbs 14 oz   /81   Pulse 88   Temp 97.7   Temp src 1   SpO2 97       General:  Alert and oriented. Good spirits.  No distress.    Lab Results:    No results found for this or any previous visit (from the past 24 hour(s)).    Imaging:    No new imaging.  "

## 2021-06-10 NOTE — PROGRESS NOTES
I met with Alissa Jansen at the request of Dr. Moore to recheck her blood pressure.  Blood pressure medications on the MAR were reviewed with patient.    Patient has taken all medications as per usual regimen: Yes  Patient reports tolerating them without any issues or concerns: Yes    Vitals:    08/14/20 0734   BP: 128/77   Pulse: 79   SpO2: 98%       Blood pressure was taken, previous encounter was reviewed, recorded blood pressure below 140/90.  Patient was discharged and the note will be sent to the provider for final review.     Lorena Gan, CMA

## 2021-06-10 NOTE — PROGRESS NOTES
"ACUPUNCTURIST TREATMENT NOTE    Name: Alissa Jansen  :  1945  MRN:  [id number]    Acupuncture Treatment  Patient Type: WW CCC  Intervention Reason: Neuropathy  Pre-session Neuropathy ratin  Post-session Neuropathy ratin  Patient complaint:: neuropathy in feet and (R) drop foot  Acupunture (Points):: (L) scalp motor line x3 with e-stim, Ba addis, (R) St 34, 36, 40, 41, (R) Sp 6, 8, 10, (R) motor points of tibialis anterior and peroneal tertius  Practitioner Observed: 30 minute treatment. Patient now walking without a cane.  Checklist: Progress Note Completed, Consent Reveiwed    \"Risks and benefits of acupuncture were discussed with patient. Consent for treatment was given. We thank you for the referral.\"     Hubert Swanson    Date:  2017  Time:  11:56 AM    "

## 2021-06-10 NOTE — PROGRESS NOTES
"ACUPUNCTURIST TREATMENT NOTE    Name: Alissa Jansen  :  1945  MRN:  [id number]    Acupuncture Treatment  Patient Type: WW CCC  Intervention Reason: Neuropathy  Pre-session Neuropathy ratin  Post-session Neuropathy ratin  Patient complaint:: Neuropathy of feet, (R) drop foot  Acupuncture (Points):: Ba addis, (L) scalp motor line x3, (R) St 34, 36, 40, 41, (R) tibialis anterior and peroneus tertius motor points  Practitioner Observed: 30 minute treatment.  Checklist: Progress Note Completed, Consent Reveiwed    \"Risks and benefits of acupuncture were discussed with patient. Consent for treatment was given. We thank you for the referral.\"     Hubert Swanson    Date:  2017  Time:  2:29 PM    "

## 2021-06-10 NOTE — PROGRESS NOTES
[facility] Hematology and Oncology Progress Note    Patient: Alissa Jansen  MRN: [id number]  Date of Service: 05/04/2017      Reason for Visit    Follow-up of right-sided breast cancer.    Assessment and Plan  Breast cancer of upper-outer quadrant of right female breast    Primary site: Breast (Right)    Staging method: AJCC 7th Edition    Clinical: Stage IIA (T2, N0, cM0) signed by Kristy Mina MD on 1/27/2016  3:26 PM    Pathologic free text: Invasive ductal carcioma with metaplastic features.    Pathologic: Stage IIA (T2, N0, cM0) signed by Kristy Mina MD on 1/27/2016  3:25 PM    Summary: Stage IIA (T2, N0, cM0)    ECOG Performance        Distress Assessment  Distress Assessment Score: No distress : Please see below.  Pain   : None.    Ms. Alissa Jansen is a 71 y.o. 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  She had a lumpectomy and sentinel lymph node procedure done by Dr. Jerry on 1/18/16.  Final pathology showed that the cancer was invasive ductal carcinoma with metaplastic features, grade 3, triple negative, with a 3 mm focus involving the inferior margin.  Final stage was Stage IIA (T2, N0, cM0).  She started chemotherapy on 2/5/16 and completed 4 cycles of dose dense AC chemotherapy.  She had 12 weeks of Taxol chemotherapy and then underwent reresection on 7/6/16.  There was no residual cancer seen in pathology.  She completed adjuvant irradiation on 9/6/16.    1.  She appears to be doing well overall except for the peripheral neuropathy.  At this time she has no symptoms or signs of breast cancer recurrence.  She was glad to hear that.  She will need a screening bilateral mammogram done in December 2017.  This is ordered today.    2.  She is taking calcium and 2000 units of vitamin D daily for osteopenia.  Her last DEXA scan was on  12/22/16.  We will plan on repeating a DEXA scan in 2018.  Meanwhile she will continue to remain physically active.    3.  Peripheral neuropathy after chemotherapy: This is improved significantly though it has not gone away completely.  I discussed with her that usually by now the peripheral neuropathy stops improving but in her case it appears to be improving still.  This gives me hope.  I am hoping that this will improve further and even her foot drop will improve.  I told her that I am not sure whether the chemotherapy is improving now on its own or whether acupuncture is helping.  If she wants she can stop for a period of time and see whether it makes a difference.  I discussed with her that the Cymbalta is helping with the painful pins and needles sensation.  It is not doing much to the numbness.  If she wants she can stop the Cymbalta when the current supply gets over.  She can wait and see how it feels over the next 1-2 weeks.  If the painful pins and needles sensation comes back significantly then she can call me and I can write for a new prescription.  I discussed with her that Cymbalta is an antidepressant and may have helped with her mood also.  This is another aspect to watch out for.  At this point she says that she does not feel depressed at all now.    4.  We discussed about follow-up appointments.  I discussed with her that she is a bit more than a year out from the surgery.  Since that she had a triple negative breast cancer I would prefer 3 monthly follow-up until she reaches 3 years.  She was agreeable to the plan.  Return to clinic with me or Reggie Osorio NP in 3 months time.    Time spend 20 minutes face to face with the patient. More than 50 % in counseling and coordination of care.    Problem List    1. Breast cancer of upper-outer quadrant of right female breast  Mammo Screening Bilateral   2. Peripheral neuropathy due to chemotherapy     3. Encounter for screening mammogram for malignant  neoplasm of breast   Mammo Screening Bilateral        CC: Brisa Solorzano MD          ______________________________________________________________________________    History of Present Illness    Ms. Alissa CONNELL Jansen he is here for follow-up alone.  Her concern is still the peripheral neuropathy from chemotherapy.  That has been a significant improvement.  She is now able to walk without a cane.  She says that she just leaves a cane in the car.  She does not use the walker at all.  She feels a lot more steady on her feet.  No falls.  However she feels restricted in what she does.  She has not been able to go golfing yet.  She still has some hot and cold sensations which come and go in her feet.  The tingling and pins and needles sensation have come down quite a bit.  She wonders whether she needs to continue the Cymbalta.  She wonders whether the acupuncture is of any benefit now.    Otherwise she is doing well.  Mood is good.  Energy is good.  She is eating well.  She has not lost weight.  No nausea or vomiting or abdominal pain.  No constipation or diarrhea.  No blood in stool or black stools.  No difficulty passing urine.  No vaginal bleeding.  No difficulty breathing.  No chest pain or cough.  No skin rashes.    Please see below.  A 14 point review of system is otherwise completely negative.      Pain Status  Currently in Pain: No/denies    Review of Systems    Constitutional  Constitutional (WDL): Exceptions to WDL  Fatigue: None  Fever: None  Chills: None  Weight Gain: 5 - <10% from baseline (up approx 10 lbs in six months)  Weight Loss: None  Neurosensory  Neurosensory (WDL): Exceptions to WDL  Peripheral Motor Neuropathy: Asymptomatic, clinical or diagnostic observations only, intervention not indicated  Ataxia: Asymptomatic, clinical or diagnostic observations only, intervention not indicated  Peripheral Sensory Neuropathy: Moderate symptoms, limiting instrumental ADL (hands and feet)  Confusion:  "None  Syncope: None  Cardiovascular  Cardiovascular (WDL): All cardiovascular elements are within defined limits  Pulmonary  Respiratory (WDL): Within Defined Limits  Gastrointestinal  Gastrointestinal (WDL): All gastrointestinal elements are within defined limits  Genitourinary  Genitourinary (WDL): All genitourinary elements are within defined limits  Integumentary  Integumentary (WDL): All integumentary elements are within defined limits  Patient Coping  Patient Coping: Accepting  Distress Assessment  Distress Assessment Score: No distress  Accompanied by  Accompanied by: Alone    Past History  Past Medical History:   Diagnosis Date     Breast cancer      Closed fracture of lateral malleolus of right ankle 09/02/2004    fell in [place].     Colon polyps 9/9/2008    2 polyps found on colonoscopy.     Hx antineoplastic chemotherapy 01/2016    Right Breast     Hx of radiation therapy 06/2016    Right Breast     Menopause     1996     Osteoarthritis          Past Surgical History:   Procedure Laterality Date     BREAST CYST ASPIRATION       BREAST LUMPECTOMY Right 01/18/2016    Dr. Jerry.     BREAST LUMPECTOMY Right 07/06/2016    Re-excision lumpectomy and port removal, Dr. Jerry     CATARACT EXTRACTION W/  INTRAOCULAR LENS IMPLANT Bilateral Left 11/19/13, Right 12/3/13     DENTAL SURGERY  ~2010     KNEE SURGERY  1975    For \"torn ligaments\"     SENTINEL LYMPH NODE BIOPSY Right 01/18/2016    Dr. Jerry.       Physical Exam    Recent Vitals 5/4/2017   Height 5' 5.5\"   Weight 161 lbs   BSA (m2) 1.84 m2   /76   Pulse 95   Temp -   Temp src -   SpO2 99       GENERAL: Alert and oriented. Seated comfortably. In no distress.    HEAD: Atraumatic and normocephalic.  Has a full head of hair.    EYES: BRITNI, EOMI.  No pallor.  No icterus.    Oral cavity: no mucosal lesion or tonsillar enlargement.    NECK: supple. JVP normal.  No thyroid enlargement.    LYMPH NODES: No palpable, cervical, axillary or inguinal " lymphadenopathy.    CHEST: clear to auscultation bilaterally.  Resonant to percussion throughout bilaterally.  Symmetrical breath movements bilaterally.    CVS: S1 and S2 are heard. Regular rate and rhythm.  No murmur or gallop or rub heard.    ABDOMEN: Soft. Not tender. Not distended.  No palpable hepatomegaly or splenomegaly.  No other mass palpable.  Bowel sounds heard.    EXTREMITIES: Warm.  No peripheral edema.    She still has a foot drop, especially on the right side.    SKIN: no rash, or bruising or purpura.    BREASTS:  Right breast: Normal in contour.  The surgical scars are healed well.  Nipple looks normal.  Skin looks normal.  No palpable mass or tenderness in the right breast.  The right axilla is without mass or nodule.    Left breast: Contour looks normal.  Skin looks normal.  The nipple looks normal.  No palpable mass.  No tenderness.  Left axilla is without mass or tenderness.        Lab Results  No results found for this or any previous visit (from the past 168 hour(s)).    Imaging    No results found.      Signed by: Kristy Mina MD

## 2021-06-10 NOTE — PROGRESS NOTES
"ACUPUNCTURIST TREATMENT NOTE    Name: Alissa Jansen  :  1945  MRN:  [id number]    Acupuncture Treatment  Patient Type: WW CCC  Intervention Reason: Neuropathy  Pre-session Neuropathy ratin  Post-session Neuropathy ratin  Patient complaint:: neuropathy in feet and (R) drop foot  Acupunture (Points):: Du 20, LI 4, St 36, tibialis anterior MP, Sp 6, Cathryn 3, Ba Lamine, scalp motor and sensory lines bilateral both with e-stim  Practitioner Observed: 30 minute treatment  Checklist: Progress Note Completed, Consent Reveiwed    \"Risks and benefits of acupuncture were discussed with patient. Consent for treatment was given. We thank you for the referral.\"     Paris Avendano L.Ac.    Date:  2017  Time:  2:06 PM    "

## 2021-06-10 NOTE — PROGRESS NOTES
"ACUPUNCTURIST TREATMENT NOTE    Name: Alissa Jansen  :  1945  MRN:  [id number]    Acupuncture Treatment  Patient Type: WW CCC  Intervention Reason: Neuropathy  Pre-session Neuropathy ratin  Post-session Neuropathy ratin  Patient complaint:: neuropathy in feet; (R) drop foot  Acupunture (Points):: BL (31, 32); Du 2, HTJJ L5, [place], tibialis posterior MP, soleous MP x2; St 36, Tibialis anterior MP, St 40, peroneous longus MP, Cathryn 3, Sp 6, Du 20, scalp sensory and motor lines  Practitioner Observed: 30 minute treatment  Checklist: Progress Note Completed, Consent Reveiwed    \"Risks and benefits of acupuncture were discussed with patient. Consent for treatment was given. We thank you for the referral.\"     Paris Avendano L.Ac.    Date:  2017  Time:  12:19 PM    "

## 2021-06-10 NOTE — PROGRESS NOTES
[facility] Hematology and Oncology Progress Note    Patient: Alissa Jansen  MRN: [id number]  Date of Service: 08/25/2020      Reason for Visit    Follow-up of right-sided breast cancer.    Assessment and Plan  Breast cancer of upper-outer quadrant of right female breast    Primary site: Breast (Right)    Staging method: AJCC 7th Edition    Clinical: Stage IIA (T2, N0, cM0) signed by Kristy Mina MD on 1/27/2016  3:26 PM    Pathologic free text: Invasive ductal carcioma with metaplastic features.    Pathologic: Stage IIA (T2, N0, cM0) signed by Kristy Mina MD on 1/27/2016  3:25 PM    Summary: Stage IIA (T2, N0, cM0)    ECOG Performance   ECOG Performance Status: 0    Distress Assessment  Distress Assessment Score: No distress :     Pain   : No pain.    Ms. Alissa Jansen is a 75 y.o. 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  She had a lumpectomy and sentinel lymph node procedure done by Dr. Jerry on 1/18/16.  Final pathology showed that the cancer was invasive ductal carcinoma with metaplastic features, grade 3, triple negative, with a 3 mm focus involving the inferior margin.  Final stage was Stage IIA (T2, N0, cM0).  She started chemotherapy on 2/5/16 and completed 4 cycles of dose dense AC chemotherapy.  She had 12 weeks of Taxol chemotherapy and then underwent reresection on 7/6/16.  There was no residual cancer seen in pathology.  She completed adjuvant irradiation on 9/6/16.    1.  She appears to be doing well from the breast cancer point of view.  No suspicious symptoms.  Nothing concerning is found on physical examination.  She was glad to hear that.  She will need her annual mammogram to be done this year.  She has it scheduled for November.  I asked her to keep that appointment.  She wonders whether it should be the usual mammogram or a  3D mammogram.  I told her that either is fine.  If the radiologists want a different order they can contact me.    I discussed with her that she is now 4 years out from the resection after chemotherapy.  The risk of recurrence of the breast cancer becomes lower as time goes on.  At this point I think we can go to 6 monthly follow-up.  When she reaches 5 years, I will ask her whether she wants to follow-up with us once a year or whether she would like to revert to follow-up with her PCP.  She will anyway need a mammogram yearly.  She voiced understanding.    2.  The peripheral neuropathy symptoms in her feet which causes foot drop bilaterally remains at least stable.  She may be doing slightly better or maybe she is adjusting better to the peripheral neuropathy.  In any case she is able to do more.  I discussed with her that unfortunately there are no new treatments that have come out for chemotherapy induced peripheral neuropathy.  She still remains hopeful.  We will continue to monitor.    3.  Reminded her to have the flu shot in this fall season.  Discussed about the precautions that she needs to take because of the coronavirus pandemic.    4.  Return to clinic with MD or NP for a history and physical in 6 months.    Time spend >25 minutes face to face with the patient. More than 50 % in counseling and coordination of care.        Problem List    1. Malignant neoplasm of upper-outer quadrant of right breast in female, estrogen receptor negative (H)     2. Peripheral neuropathy due to chemotherapy (H)          CC: Christina Moore MD             ______________________________________________________________________________    History of Present Illness    Ms. Alissa Jansen is here for follow-up alone.    She states that except for the peripheral neuropathy symptoms she is doing well.  She still has the foot drop and the numbness in her feet.  However she states that she is trying to do more with that.   She goes walking for about a mile every day.  She is tolerating that well.  She feels that certainly the symptoms are not worsening.  She remains physically and socially active.  She does not have any pain anywhere.  She has not noticed any lumps or bumps anywhere.  Breathing is fine.  No chest pain or cough.  No unusual headaches.  No eyesight problems.  No mouth sores.  No swallowing difficulty.  No nausea or vomiting.  No abdominal pain.  No constipation or diarrhea.  No blood in stool or black stools.  No skin rashes.  No difficulty with urination.  No vaginal discharge.    Please see below.  A 14 point review of system is otherwise completely negative.      Pain Status  Currently in Pain: No/denies    Review of Systems    Constitutional  Constitutional (WDL): Exceptions to WDL  Weight Loss: to <10% from baseline, intervention not indicated(8#)  Neurosensory  Neurosensory (WDL): Exceptions to WDL  Peripheral Motor Neuropathy: Asymptomatic, clinical or diagnostic observations only, intervention not indicated(feet, hands)  Ataxia: Asymptomatic, clinical or diagnostic observations only, intervention not indicated  Peripheral Sensory Neuropathy: Asymptomatic, loss of deep tendon reflexes or paresthesia  Cardiovascular  Cardiovascular (WDL): All cardiovascular elements are within defined limits  Pulmonary  Respiratory (WDL): Within Defined Limits  Gastrointestinal  Gastrointestinal (WDL): All gastrointestinal elements are within defined limits  Genitourinary  Genitourinary (WDL): All genitourinary elements are within defined limits  Integumentary  Integumentary (WDL): All integumentary elements are within defined limits  Patient Coping  Patient Coping: Open/discussion  Distress Assessment  Distress Assessment Score: No distress  Accompanied by       Past History  Past Medical History:   Diagnosis Date     Closed fracture of lateral malleolus of right ankle 09/02/2004    fell in a [employer] golfing.     Colon polyps  "9/9/2008    2 polyps found on colonoscopy.     Colon polyps 05/17/2019    found on colonoscopy, adenomatous and sessile serrated adenoma, repeat colonscopy 3 yrs     Gastric erosions 01/28/2020    Reactive gastropathy.     H/O colonoscopy 01/2014    repeat 5 years     Hiatal hernia 01/28/2020     Hx antineoplastic chemotherapy 01/2016    Right Breast     Hx of radiation therapy 06/2016    Right Breast     Hyperlipidemia      Malignant neoplasm of upper-outer quadrant of right breast in female, estrogen receptor negative (H) 12/22/2015     Menopause     1996     Osteoarthritis          Past Surgical History:   Procedure Laterality Date     BREAST BIOPSY Right 2015    dx breast cancer     BREAST LUMPECTOMY Right 01/18/2016    Dr. Jerry.     BREAST LUMPECTOMY Right 07/06/2016    Re-excision lumpectomy and port removal, Dr. Jerry     CATARACT EXTRACTION W/  INTRAOCULAR LENS IMPLANT Bilateral Left 11/19/13, Right 12/3/13     COLONOSCOPY W/ POLYPECTOMY  05/16/2019     DENTAL SURGERY  ~2010     ESOPHAGOGASTRODUODENOSCOPY  01/28/2020    Large hiatal hernia.  Few stomach erosions.  Biopsy showed reactive gastropathy.  H. pylori negative.     KNEE SURGERY Left 1975    For \"torn ligaments\"     SENTINEL LYMPH NODE BIOPSY Right 01/18/2016    Dr. Jerry.       Physical Exam    Recent Vitals 8/25/2020   Height -   Weight -   BSA (m2) -   /92   Pulse -   Temp -   Temp src -   SpO2 -   Some recent data might be hidden       GENERAL: Alert and oriented to time place and person. Seated comfortably. In no distress.  She looks well overall.  Looks about the same as in the last visit.  She is not as anxious as in the previous visits.  She looks quite collected today.  I observed her walking.  She appears steady on her feet.  The foot drop does not look as prominent as earlier when she walks.    HEAD: Atraumatic and normocephalic.    EYES: BRITNI, EOMI.  No pallor.  No icterus.    Oral cavity: no mucosal lesion or tonsillar " enlargement.    NECK: supple. JVP normal.  No thyroid enlargement.    LYMPH NODES: No palpable, cervical, axillary or inguinal lymphadenopathy.    CHEST: clear to auscultation bilaterally.  Resonant to percussion throughout bilaterally.  Symmetrical breath movements bilaterally.    CVS: S1 and S2 are heard. Regular rate and rhythm.  No murmur or gallop or rub heard.  No peripheral edema.    ABDOMEN: Soft. Not tender. Not distended.  Mild abdominal obesity.  No palpable hepatomegaly or splenomegaly.  No other mass palpable.  Bowel sounds heard.    EXTREMITIES: Warm.    SKIN: no rash, or bruising or purpura.  Has a full head of hair.    BREASTS:  Right breast: Normal in contour.  Surgical scar has healed well.  No underlying induration.  Nipple looks normal.  Skin looks normal.  There is no palpable mass or tenderness in the right breast.  The right axilla is without mass or nodule.    Left breast: Contour looks normal.  No palpable mass.  No tenderness.  Nipple looks normal.  Skin looks normal.  Left axilla is without mass or nodule.        Lab Results  No results found for this or any previous visit (from the past 168 hour(s)).      Imaging      No results found.      Signed by: Kristy Mina MD

## 2021-06-10 NOTE — PROGRESS NOTES
"ACUPUNCTURIST TREATMENT NOTE    Name: Alissa Jansen  :  1945  MRN:  [id number]    Acupuncture Treatment  Patient Type: WW CCC  Intervention Reason: Neuropathy  Pre-session Neuropathy ratin  Post-session Neuropathy ratin  Patient complaint:: neuropathy in feet and (R) drop foot  Acupunture (Points):: Du 20, scalp motor and sensory lines, vastus medialis and lateralis MP, St 36, tibialis anterior MP, peroneous longus and tertius MP, Sp 6, (R) BL 40, Ki 3, Sp 6, Cathryn 3  Practitioner Observed: 30 minute treatment  Checklist: Progress Note Completed, Consent Reveiwed    \"Risks and benefits of acupuncture were discussed with patient. Consent for treatment was given. We thank you for the referral.\"     Paris Avendano L.Ac.    Date:  2017  Time:  3:10 PM    "

## 2021-06-10 NOTE — PROGRESS NOTES
"ACUPUNCTURIST TREATMENT NOTE    Name: Alissa Jansen  :  1945  MRN:  [id number]    Acupuncture Treatment  Patient Type: WW CCC  Intervention Reason: Neuropathy  Pre-session Neuropathy ratin  Post-session Neuropathy ratin  Patient complaint:: Neuropathy of feet, (R) drop foot  Acupunture (Points):: Cathryn 4, 6, Ba addis, (R) St 36, 40, 41, Bl 31, 32, Soleus motor point x2, Gastrocnemius motor point x2, Carlotta @ L4, 5, Bl 31, 32, (R) Peroneus tertius motor point, (R) Tibialis anterior motor point  Practitioner Observed: 30 minute treatment.  Checklist: Progress Note Completed, Consent Reveiwed    \"Risks and benefits of acupuncture were discussed with patient. Consent for treatment was given. We thank you for the referral.\"     Hubert Swanson    Date:  2017  Time:  3:32 PM    "

## 2021-06-10 NOTE — PROGRESS NOTES
"Alissa Jansen is a 75 y.o. female who is being evaluated via a billable telephone visit.      The patient has been notified of following:     \"This telephone visit will be conducted via a call between you and your physician/provider. We have found that certain health care needs can be provided without the need for a physical exam.  This service lets us provide the care you need with a short phone conversation.  If a prescription is necessary we can send it directly to your pharmacy.  If lab work is needed we can place an order for that and you can then stop by our lab to have the test done at a later time.    Telephone visits are billed at different rates depending on your insurance coverage. During this emergency period, for some insurers they may be billed the same as an in-person visit.  Please reach out to your insurance provider with any questions.    If during the course of the call the physician/provider feels a telephone visit is not appropriate, you will not be charged for this service.\"    Patient has given verbal consent to a Telephone visit? Yes    What phone number would you like to be contacted at? [phone]    Patient would like to receive their AVS by AVS Preference: Filiberto.     Chief Complaint   Patient presents with     Medication Refill     med check     No Known Allergies    Past Medical History:   Diagnosis Date     Breast cancer (H) 12/2015    Right Breast, lumpectomy     Closed fracture of lateral malleolus of right ankle 09/02/2004    fell in a hole golfing.     Colon polyps 9/9/2008    2 polyps found on colonoscopy.     Colon polyps 05/17/2019    found on colonoscopy, adenomatous and sessile serrated adenoma, repeat colonscopy 3 yrs     H/O colonoscopy 01/2014    repeat 5 years     Hx antineoplastic chemotherapy 01/2016    Right Breast     Hx of radiation therapy 06/2016    Right Breast     Hyperlipidemia      Menopause     1996     Osteoarthritis      Past Surgical History: " "  Procedure Laterality Date     BREAST BIOPSY Right     dx breast cancer     BREAST LUMPECTOMY Right 2016    Dr. Jerry.     BREAST LUMPECTOMY Right 2016    Re-excision lumpectomy and port removal, Dr. Jerry     CATARACT EXTRACTION W/  INTRAOCULAR LENS IMPLANT Bilateral Left 13, Right 12/3/13     COLONOSCOPY W/ POLYPECTOMY  2019     DENTAL SURGERY  ~     KNEE SURGERY Left     For \"torn ligaments\"     SENTINEL LYMPH NODE BIOPSY Right 2016    Dr. Jerry.     Family History   Problem Relation Age of Onset     Breast cancer Paternal Aunt         age in 70's     Heart disease Father         \"Enlarged heart and liver\",  age 48, not much info.      Thyroid disease Sister      No Medical Problems Daughter        Current Outpatient Medications:      calcium carbonate-vitamin D3 (CALCIUM 600 + D,3,) 600 mg calcium- 200 unit cap, Take by mouth., Disp: , Rfl:      cholecalciferol, vitamin D3, 1,000 unit tablet, Take 2,000 Units by mouth daily., Disp: , Rfl:      multivitamin with minerals (THERA-M) 9 mg iron-400 mcg Tab tablet, Take 1 tablet by mouth daily., Disp: , Rfl: 0     simvastatin (ZOCOR) 20 MG tablet, Take 1 tablet (20 mg total) by mouth at bedtime., Disp: 90 tablet, Rfl: 1     OMEGA-3/DHA/EPA/FISH OIL (FISH OIL-OMEGA-3 FATTY ACIDS) 300-1,000 mg capsule, Take 2 g by mouth daily., Disp: , Rfl:     Additional provider notes:   Patient is seen today via telephone visit rather than office visit due to the COVID 19 outbreak pandemic.  This is done for the protection of patient from potential exposure to this virus by coming to the clinic.  Patient is being evaluated today for her hyperlipidemia.  She continues on Zocor without problems or complications.  She is having no side effects to the medicine.  She does need a refill of that today.  She overall is feeling well.  She denies that she is having any chest pain or shortness of breath or problems with swelling of her extremities " or other issues.  She does note that she continues to have neuropathy and it is frustrating for her.  She knows that there is nothing really that anybody can do but it certainly is frustrating.  She has neuropathy secondary to chemotherapy for her breast cancer.  She notes that the pandemic is taking a toll on her as well.  She is tired of staying inside and not being able to go out and do what she needs to do.  She is on a lot of vitamins and is wondering if she needs to continue on those.  We went through all of her vitamins today and she will continue to take the calcium that she has been supplementing with as well as the vitamin D that she has been supplementing as well as her tight vitamin but she will stop the fish oil because it upsets her tummy.  Is not really sure why she was on that medication but she will stop that now.  She did have Prilosec on her medication list but she notes that she has not been taking that.  She was wondering about a shingles vaccination we discussed that at length today.  She is get a check with her insurance and see if they cover a shingles vaccination if they do where she should get it.  I did discuss with her that I would highly recommend that she get a vaccination for shingles.    Assessment/Plan:  1. Mixed hyperlipidemia  - Comprehensive Metabolic Panel; Future  - Lipid Cascade; Future  - simvastatin (ZOCOR) 20 MG tablet; Take 1 tablet (20 mg total) by mouth at bedtime.  Dispense: 90 tablet; Refill: 1    2. Prediabetes  - Comprehensive Metabolic Panel; Future    3. 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  She is feeling well and is tolerating medication without problems.  She does need a refill of her Zocor today.  She has not any problems or complications as result of taking the medication.  We will go ahead and check metabolic panel as well as lipid panel.  She will set up a lab only appointment to have those drawn as well as to get a  blood pressure check.  She feels like overall things are doing well.  She has had prediabetes in the past and so metabolic panel will also give us a glucose to make sure that she has not developed diabetes as per say.  She does have peripheral neuropathy which she continues to lisa with.  It overall is fairly stable and she is very excited about that.  She will check on the shingles vaccination with her insurance and see where they would like her to get that vaccination.  I explained to her that some insurance companies prefer them to get it at the pharmacy and some prefer to get it here at the clinic.  When she knows where they would like her to get it certainly should go ahead and get that.  I discussed with her that is a 2 part vaccination where she gets 1 now and 1 2 to 6 months later.  She voiced understanding of all of the above.  We will see her back in about 6 months for follow-up of her Zocor.  All of her questions were answered today.    Phone call duration:  10 minutes    Christina Moore MD

## 2021-06-10 NOTE — PROGRESS NOTES
"ACUPUNCTURIST TREATMENT NOTE    Name: Alissa Jansen  :  1945  MRN:  [id number]    Acupuncture Treatment  Patient Type: WW CCC  Intervention Reason: Neuropathy  Pre-session Neuropathy ratin  Post-session Neuropathy ratin  Patient complaint:: (R) drop foot; neuropathy in feet  Acupunture (Points):: Du 20, LI 4, peroneous longus-peroneous tertius MP with e-stim; St 36-tibialis anterior MP with e-stim; (R) St 34-Sp 10 with e-stim; Ki 7, Sp 6, Ki 3, Ba Lamine  Practitioner Observed: 30 minute treatment  Checklist: Progress Note Completed, Consent Reveiwed  Follow up comments: Pt. is walking without cane; Practitioner observed that patient is walking faster and has more strength and control in (R) foot while walking    \"Risks and benefits of acupuncture were discussed with patient. Consent for treatment was given. We thank you for the referral.\"     Paris Avendano L.Ac.    Date:  2017  Time:  3:03 PM    "

## 2021-06-10 NOTE — PROGRESS NOTES
I met with Pat today prior to her fu appt with Dr. Mina.  She was walking without a cane or walker.  She was happy.  Said she has been very busy, often having 2-3 things to go to in a day.  Her grandsons are playing baseball and she is very involved in their lives.  She mentioned she forgot to come to the last [employer] [place] [facility].  I invited her to the next one, told her I will email her the flier.  Support and encouragement provided, invited calls.

## 2021-06-12 NOTE — PROGRESS NOTES
[facility] Hematology and Oncology Progress Note    Patient: Alissa Jansen  MRN: [id number]  Date of Service: 08/04/2017      Reason for Visit    Follow-up of right-sided breast cancer.    Assessment and Plan  Breast cancer of upper-outer quadrant of right female breast    Primary site: Breast (Right)    Staging method: AJCC 7th Edition    Clinical: Stage IIA (T2, N0, cM0) signed by Kristy Mina MD on 1/27/2016  3:26 PM    Pathologic free text: Invasive ductal carcioma with metaplastic features.    Pathologic: Stage IIA (T2, N0, cM0) signed by Kristy Mina MD on 1/27/2016  3:25 PM    Summary: Stage IIA (T2, N0, cM0)    ECOG Performance   ECOG Performance Status: 1    Distress Assessment  Distress Assessment Score: No distress : Please see below.  Pain   : None.    Ms. Alissa Jansen is a 72 y.o. 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  She had a lumpectomy and sentinel lymph node procedure done by Dr. Jerry on 1/18/16.  Final pathology showed that the cancer was invasive ductal carcinoma with metaplastic features, grade 3, triple negative, with a 3 mm focus involving the inferior margin.  Final stage was Stage IIA (T2, N0, cM0).  She started chemotherapy on 2/5/16 and completed 4 cycles of dose dense AC chemotherapy.  She had 12 weeks of Taxol chemotherapy and then underwent reresection on 7/6/16.  There was no residual cancer seen in pathology.  She completed adjuvant irradiation on 9/6/16.    1.  She appears to be doing well except for the peripheral neuropathy.  At this time there is no evidence of breast cancer recurrence based on symptoms or physical examination.  She was glad to hear that.  She has not yet scheduled the mammogram for December.  I asked her to schedule that.    2.  We had a long conversation regarding peripheral neuropathy.   "She is very frustrated about it.  Emotional support was given.  She has tried various different modalities of treatment for the peripheral neuropathy.  She feels that some of them helped a bit but nothing did an overwhelming improvement.  I told her that I usually tell patients that they can expect improvement in peripheral neuropathy for 6 months after chemotherapy but then it usually remains static.  In her case she has already beaten those orders.  She continues to improve slowly even after a year.  From what she tells me I can make out that there is improvement in the peripheral neuropathy even now.  I am hoping that she will continue to slowly improve.  She felt much better after hearing that.  She wants to go for an exercise class for cancer patients in the \"[place]\".  I signed the form for that.    3.  She has osteopenia.  She is to continue with calcium and vitamin D and exercise for that.  We will plan on repeating a DEXA scan in 2018.    4.  Our plan for follow-up is for a 3 monthly follow-up under she reaches 3 years out from surgery.  Return to clinic with me or Reggie Osorio NP in 3 months.    Time spend >30 minutes face to face with the patient. More than 50 % in counseling and coordination of care.          Problem List    1. Breast cancer of upper-outer quadrant of right female breast     2. Peripheral neuropathy due to chemotherapy          CC: Brisa Solorzano MD          ______________________________________________________________________________    History of Present Illness    Ms. Alissa Jansen is here for follow-up alone.    She says that physically she is doing well overall except for the peripheral neuropathy.  She says that she is very frustrated with the peripheral neuropathy because it does not allow her to do the things that she wants to do.  She is slow when she walks.  However she is up and about and is going for a lot of activities.  She says that the feet feel numb.  The " paresthesia and the pins and needles pain have disappeared completely.  Occasionally she has charley horses at night.  She then needs to stand up and walk to make it go away.  Sometimes she has a bit of she had to go like pain in the right side.  She still has a foot drop and has to take high steps.  She has not had any recent falls.    Note is that she does not use the walker or the cane.  She says that she just keeps looking in the car just in case.    No fevers or night sweats.  No lumps or bumps anywhere.  No unusual headaches.  No mouth sores.  No swallowing difficulty.  She has not lost weight.  No nausea or vomiting.  No abdominal pain.  No difficulty breathing.  No chest pain or cough.  No constipation or diarrhea.  No blood in stool or black stools.  No vaginal bleeding.  No difficulty passing urine.  No skin rashes.    Please see below.  A 14 point review of system is otherwise completely negative.      Pain Status  Currently in Pain: No/denies    Review of Systems    Constitutional  Constitutional (WDL): Exceptions to WDL  Fatigue: None  Fever: None  Chills: None  Weight Gain: 5 - <10% from baseline (up 5 3 since 5/04/17)  Weight Loss: None  Neurosensory  Neurosensory (WDL): Exceptions to WDL  Peripheral Motor Neuropathy: Asymptomatic, clinical or diagnostic observations only, intervention not indicated  Ataxia: Moderate symptoms, limiting instrumental ADL  Peripheral Sensory Neuropathy: Moderate symptoms, limiting instrumental ADL  Confusion: None  Syncope: None  Cardiovascular  Cardiovascular (WDL): All cardiovascular elements are within defined limits  Palpitations: None  Edema: No (by night will have swelling)  Pulmonary  Respiratory (WDL): Within Defined Limits  Gastrointestinal  Gastrointestinal (WDL): All gastrointestinal elements are within defined limits  Genitourinary  Genitourinary (WDL): All genitourinary elements are within defined limits  Integumentary  Integumentary (WDL): All integumentary  "elements are within defined limits  Patient Coping  Patient Coping: Other (Comment) (frustrated about neuropathy)  Distress Assessment  Distress Assessment Score: No distress  Accompanied by  Accompanied by: Alone    Past History  Past Medical History:   Diagnosis Date     Breast cancer      Closed fracture of lateral malleolus of right ankle 09/02/2004    fell in [employer] golfing.     Colon polyps 9/9/2008    2 polyps found on colonoscopy.     Hx antineoplastic chemotherapy 01/2016    Right Breast     Hx of radiation therapy 06/2016    Right Breast     Menopause     1996     Osteoarthritis          Past Surgical History:   Procedure Laterality Date     BREAST CYST ASPIRATION       BREAST LUMPECTOMY Right 01/18/2016    Dr. Jerry.     BREAST LUMPECTOMY Right 07/06/2016    Re-excision lumpectomy and port removal, Dr. Jerry     CATARACT EXTRACTION W/  INTRAOCULAR LENS IMPLANT Bilateral Left 11/19/13, Right 12/3/13     DENTAL SURGERY  ~2010     KNEE SURGERY  1975    For \"torn ligaments\"     SENTINEL LYMPH NODE BIOPSY Right 01/18/2016    Dr. Jerry.       Physical Exam    Recent Vitals 8/4/2017   Height 5' 5.5\"   Weight 166 lbs 8 oz   BSA (m2) 1.87 m2   /78   Pulse 85   Temp -   Temp src -   SpO2 97   Some recent data might be hidden       GENERAL: Alert and oriented. Seated comfortably. In no distress.  She looks well overall but is a bit emotional.    HEAD: Atraumatic and normocephalic.  Has a full head of hair.    EYES: BRITNI, EOMI.  No pallor.  No icterus.    Oral cavity: no mucosal lesion or tonsillar enlargement.    NECK: supple. JVP normal.  No thyroid enlargement.    LYMPH NODES: No palpable, cervical, axillary or inguinal lymphadenopathy.    CHEST: clear to auscultation bilaterally.  Resonant to percussion throughout bilaterally.  Symmetrical breath movements bilaterally.    CVS: S1 and S2 are heard. Regular rate and rhythm.  No murmur or gallop or rub heard.    ABDOMEN: Soft. Not tender. Not distended.  No " palpable hepatomegaly or splenomegaly.  No other mass palpable.  Bowel sounds heard.    EXTREMITIES: Warm.  No peripheral edema.    She still has a foot drop more prominent on the right side.    SKIN: no rash, or bruising or purpura.    BREASTS:  Right breast: Normal in control.  The surgical scars are healed very well.  No underlying induration.  The nipple looks normal.  The skin looks normal.  No palpable mass or tenderness in the right breast.  The right axilla is without mass or nodule.    Left breast: The contour looks normal.  Skin looks normal.  The nipple looks normal.  No palpable mass.  No tenderness.  Left axilla is without mass or tenderness.      Lab Results  No results found for this or any previous visit (from the past 168 hour(s)).    Imaging    No results found.      Signed by: Kristy Mina MD

## 2021-06-13 NOTE — PROGRESS NOTES
I met with Pat today following her visit with Dr. Mina.  She was walking without a cane or walker.  She was moving much better than the last time I saw her.  She said she feels good, but continues to be frustrated at her ability to walk.  She doesn't seem to see the progress she has made.  She is doing the [employer] exercise program at the [facility] in [place] and feels it is helping her.  She had only good things to say about the program.      Support and encouragement and reassurance provided.  I encouraged her to come to the next [employer].  Invited calls.

## 2021-06-13 NOTE — PROGRESS NOTES
[facility] Hematology and Oncology Progress Note    Patient: Alissa Jansen  MRN: [id number]  Date of Service: 11/06/2017      Reason for Visit    Follow-up of right-sided breast cancer.    Assessment and Plan  Breast cancer of upper-outer quadrant of right female breast    Primary site: Breast (Right)    Staging method: AJCC 7th Edition    Clinical: Stage IIA (T2, N0, cM0) signed by Kristy Mina MD on 1/27/2016  3:26 PM    Pathologic free text: Invasive ductal carcioma with metaplastic features.    Pathologic: Stage IIA (T2, N0, cM0) signed by Kristy Mina MD on 1/27/2016  3:25 PM    Summary: Stage IIA (T2, N0, cM0)    ECOG Performance   ECOG Performance Status: 1    Distress Assessment  Distress Assessment Score: No distress : Please see below.  Pain   : None.    Ms. Alissa Jansen is a 72 y.o. 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  She had a lumpectomy and sentinel lymph node procedure done by Dr. Jerry on 1/18/16.  Final pathology showed that the cancer was invasive ductal carcinoma with metaplastic features, grade 3, triple negative, with a 3 mm focus involving the inferior margin.  Final stage was Stage IIA (T2, N0, cM0).  She started chemotherapy on 2/5/16 and completed 4 cycles of dose dense AC chemotherapy.  She had 12 weeks of Taxol chemotherapy and then underwent reresection on 7/6/16.  There was no residual cancer seen in pathology.  She completed adjuvant irradiation on 9/6/16.    1.  Overall she appears to be doing well.  The only concern that she has is about the continuing symptoms of peripheral neuropathy.  At this time there is no evidence of breast cancer recurrence.  She is going to have her annual mammogram done in December.  I reminded her not to miss the appointment.    2.  I encouraged her to continue with the exercise  program for the peripheral neuropathy.  I believe that she still has some slow improvement going on.    3.  She was given the seasonal flu vaccine today.    4.  She has osteopenia.  She is to continue with calcium and vitamin D and exercise for that.  We will plan on repeating a DEXA scan in 2018.    5.  Our plan is to follow-up every 3 months until she reaches 3 years out from surgery.  Return to clinic with me or Reggie Osorio NP in 3 months.    Time spend >15 minutes face to face with the patient. More than 50 % in counseling and coordination of care.            Problem List    1. Malignant neoplasm of upper-outer quadrant of right breast in female, estrogen receptor negative     2. Peripheral neuropathy due to chemotherapy          CC:           ______________________________________________________________________________    History of Present Illness    Ms. Alissa Jansen is here for follow-up alone.  She says that she is feeling well except for the peripheral neuropathy.  Symptoms of peripheral neuropathy are certainly stable.  They have not worsened.  She is now going to the  for an exercise class through the [employer] program.  This lasts for 12 weeks.  She feels that she is benefiting from it.  She admits that she is walking perhaps better.  She has not had any falls.  She feels that she has not felt any change in the numbness or tingling.  She feels the numbness mostly in her feet but she also has mild numbness and tingling in her fingertips.  A friend who had not seen her for some months saw her recently and felt that she is walking better.    She has not noticed any other lumps or bumps.  Energy is good.  No aches or pains anywhere.  No unusual headaches.  No eyesight problems.  No mouth sores.  No swallowing difficulty.  Weight is stable.  Breathing is fine.  No chest pain or cough.  No nausea or vomiting or abdominal pain.  No constipation or diarrhea.  No blood in stool or black stools.  No  vaginal bleeding.  No difficulty passing urine.  No skin rashes.    Please see below.  A 14 point review of system is otherwise completely negative.      Pain Status  Currently in Pain: No/denies    Review of Systems    Constitutional  Constitutional (WDL): Exceptions to WDL  Fatigue: None  Fever: None  Chills: None  Weight Gain: 5 - <10% from baseline (3# in three months)  Weight Loss: None  Neurosensory  Neurosensory (WDL): Exceptions to WDL  Peripheral Motor Neuropathy: Moderate symptoms, limiting instrumental ADL  Ataxia: Asymptomatic, clinical or diagnostic observations only, intervention not indicated  Peripheral Sensory Neuropathy: Moderate symptoms, limiting instrumental ADL (fingers and feet)  Confusion: None  Syncope: None  Cardiovascular  Cardiovascular (WDL): All cardiovascular elements are within defined limits  Pulmonary  Respiratory (WDL): Within Defined Limits  Gastrointestinal  Gastrointestinal (WDL): All gastrointestinal elements are within defined limits  Anorexia: None  Constipation: None  Diarrhea: None  Dysphagia: None  Esophagitis: None  Nausea: None  Pharyngitis: None  Vomiting: None  Dysgeusia: None  Dry Mouth: None  Genitourinary  Genitourinary (WDL): All genitourinary elements are within defined limits  Integumentary  Integumentary (WDL): All integumentary elements are within defined limits  Patient Coping  Patient Coping: Accepting;Open/discussion  Distress Assessment  Distress Assessment Score: No distress  Accompanied by  Accompanied by: Alone    Past History  Past Medical History:   Diagnosis Date     Breast cancer      Closed fracture of lateral malleolus of right ankle 09/02/2004    fell in [employer] [place].     Colon polyps 9/9/2008    2 polyps found on colonoscopy.     Hx antineoplastic chemotherapy 01/2016    Right Breast     Hx of radiation therapy 06/2016    Right Breast     Menopause     1996     Osteoarthritis          Past Surgical History:   Procedure Laterality Date     BREAST  "CYST ASPIRATION       BREAST LUMPECTOMY Right 01/18/2016    Dr. Jerry.     BREAST LUMPECTOMY Right 07/06/2016    Re-excision lumpectomy and port removal, Dr. Jerry     CATARACT EXTRACTION W/  INTRAOCULAR LENS IMPLANT Bilateral Left 11/19/13, Right 12/3/13     DENTAL SURGERY  ~2010     KNEE SURGERY  1975    For \"torn ligaments\"     SENTINEL LYMPH NODE BIOPSY Right 01/18/2016    Dr. Jerry.       Physical Exam    Recent Vitals 11/6/2017   Height -   Weight 169 lbs 11 oz   BSA (m2) -   /78   Pulse 83   Temp -   Temp src -   SpO2 98   Some recent data might be hidden       GENERAL: Alert and oriented. Seated comfortably. In no distress.  She looks well overall.    HEAD: Atraumatic and normocephalic.  Has a full head of hair.    EYES: BRITNI, EOMI.  No pallor.  No icterus.    Oral cavity: no mucosal lesion or tonsillar enlargement.    NECK: supple. JVP normal.  No thyroid enlargement.    LYMPH NODES: No palpable, cervical, axillary or inguinal lymphadenopathy.    CHEST: clear to auscultation bilaterally.  Resonant to percussion throughout bilaterally.  Symmetrical breath movements bilaterally.    CVS: S1 and S2 are heard. Regular rate and rhythm.  No murmur or gallop or rub heard.    ABDOMEN: Soft. Not tender. Not distended.  No palpable hepatomegaly or splenomegaly.  No other mass palpable.  Bowel sounds heard.    EXTREMITIES: Warm.  No peripheral edema.    SKIN: no rash, or bruising or purpura.    BREASTS:  Right breast: Normal in contour.  Surgical scar has healed well.  No underlying induration palpable.  The nipple looks normal.  The skin looks normal.  No palpable mass or tenderness in the right breast.  The right axilla is without mass or nodule.    Left breast: The contour looks normal.  The skin looks normal.  The nipple looks normal.  No palpable mass.  No tenderness.  The left axilla is without mass or tenderness.      Lab Results  No results found for this or any previous visit (from the past 168 " hour(s)).    Imaging    No results found.      Signed by: Kristy Mina MD

## 2021-06-15 NOTE — PROGRESS NOTES
This is a 72 y.o. woman who comes in for  continued follow-up of her right breast cancer.  She is now 2 years  status post right partial mastectomy with radiation.  She is feeling well.  She has no new complaints today.  She still has significant neuropathy secondary to the Taxol but has been overall doing well.      Please see the chart review for PMH, Meds, allergies, FH and SH.    ROS:  A 12 point comprehensive review of systems was negative except as noted.      Physical Exam:  /75 (Patient Site: Right Arm, Patient Position: Sitting, Cuff Size: Adult Large)  Pulse 85  SpO2 97%  Breastfeeding? No  General appearance: alert, appears stated age and cooperative  Breasts: There are no palpable masses. There are minimal radiation changes. The scar has healed up well.  Lymph nodes: Cervical, supraclavicular, and axillary nodes normal.  Neurologic: Grossly normal    Data Review: Reviewed her current mammograms. No evidence of disease.      Impression: Personal History of breast cancer. NOD.  Overall doing well.  Explained that at this point follow-up with me can be somewhat as needed.  If she feels more comfortable coming in to see me yearly, I am happy to see her but if she continues with her yearly mammograms and continues to see Dr. Mina then I am also okay with that.    Plan: Follow up in 1 year with bilateral mammograms.  Her mammograms are normal then follow-up with me can be at her discretion.

## 2021-06-15 NOTE — PROGRESS NOTES
PROGRESS NOTE   2/16/2021    SUBJECTIVE:  Alissa Jansen is a 75 y.o. female  who presents for   Chief Complaint   Patient presents with     Hyperlipidemia     Follow up. Fasting     Patient comes in today for follow-up of her hyperlipidemia.  She continues on Zocor 20 mg a day.  She is tolerating that well.  She is having no side effects from that.  She overall is feeling well.  She denies that she is having any chest pain or shortness of breath or problems with swelling.  She will be getting her first Covid shot later today at [facility].  She is very excited about that.  She does have a history of breast cancer but that is been relatively stable.  She continues to see Dr. Schmidt on a regular basis in regards to her breast cancer diagnosis.  She is due for another mammogram in November 2021.  We did discuss today that she is due for a update of her tetanus shot in April 2021.  She would like to delay that for now so that she can get her Covid vaccinations and then will think about getting the tetanus vaccination.  We also discussed shingles vaccination.  She will follow up with the insurance company prior to getting the shingles vaccination and again does not want to get it currently because she would like to get done with her Covid vaccinations first.  She also notes that if is very expensive she needs to make sure that she can afford it before she gets that.  She declines hepatitis C test as she feels like she is at low risk for that.  Patient does have a history of prediabetes and has had low potassium in the past we will go ahead and check metabolic panel to follow-up on those issues.    Patient Active Problem List   Diagnosis     Benign Adenomatous Polyp Of The Large Intestine     Prediabetes     Osteopenia     Mixed hyperlipidemia     Malignant neoplasm of upper-outer quadrant of right breast in female, estrogen receptor negative (H)     Hypokalemia     Difficulty coping with disease     Peripheral  neuropathy due to chemotherapy (H)     Health education/counseling     Epigastric pain       Current Outpatient Medications   Medication Sig Dispense Refill     calcium carbonate-vitamin D3 (CALCIUM 600 + D,3,) 600 mg calcium- 200 unit cap Take by mouth.       cholecalciferol, vitamin D3, 1,000 unit tablet Take 2,000 Units by mouth daily.       multivitamin with minerals (THERA-M) 9 mg iron-400 mcg Tab tablet Take 1 tablet by mouth daily.  0     simvastatin (ZOCOR) 20 MG tablet Take 1 tablet (20 mg total) by mouth at bedtime. 90 tablet 1     No current facility-administered medications for this visit.        No Known Allergies    Past Medical History:   Diagnosis Date     Closed fracture of lateral malleolus of right ankle 09/02/2004    fell in a hole golfing.     Colon polyps 9/9/2008    2 polyps found on colonoscopy.     Colon polyps 05/17/2019    found on colonoscopy, adenomatous and sessile serrated adenoma, repeat colonscopy 3 yrs     Gastric erosions 01/28/2020    Reactive gastropathy.     H/O colonoscopy 01/2014    repeat 5 years     Hiatal hernia 01/28/2020     Hx antineoplastic chemotherapy 01/2016    Right Breast     Hx of radiation therapy 06/2016    Right Breast     Hyperlipidemia      Malignant neoplasm of upper-outer quadrant of right breast in female, estrogen receptor negative (H) 12/22/2015     Menopause     1996     Osteoarthritis        Past Surgical History:   Procedure Laterality Date     BREAST BIOPSY Right 2015    dx breast cancer     BREAST LUMPECTOMY Right 01/18/2016    Dr. Jerry.     BREAST LUMPECTOMY Right 07/06/2016    Re-excision lumpectomy and port removal, Dr. Jerry     CATARACT EXTRACTION W/  INTRAOCULAR LENS IMPLANT Bilateral Left 11/19/13, Right 12/3/13     COLONOSCOPY W/ POLYPECTOMY  05/16/2019     DENTAL SURGERY  ~2010     ESOPHAGOGASTRODUODENOSCOPY  01/28/2020    Large hiatal hernia.  Few stomach erosions.  Biopsy showed reactive gastropathy.  H. pylori negative.     KNEE SURGERY  "Left 1975    For \"torn ligaments\"     SENTINEL LYMPH NODE BIOPSY Right 01/18/2016    Dr. Jerry.       Social History     Tobacco Use   Smoking Status Never Smoker   Smokeless Tobacco Never Used   Tobacco Comment    No exposure       OBJECTIVE:     /62   Pulse 88   Wt 174 lb (78.9 kg)   SpO2 98%   BMI 28.96 kg/m      Physical Exam:  GENERAL APPEARANCE: A&A, NAD, well hydrated, well nourished  SKIN:  Normal skin turgor, no lesions/rashes   CV: RRR, no M/G/R   LUNGS: CTAB  EXTREMITY: no swelling noted.  Full range of motion of all 4 extremities.   NEURO: no gross deficits   PSYCHIATRIC;  Mood appropriate, memory intact    LABS:     Recent Results (from the past 240 hour(s))   Lipid Cascade RANDOM   Result Value Ref Range    Cholesterol 157 <=199 mg/dL    Triglycerides 58 <=149 mg/dL    HDL Cholesterol 55 >=50 mg/dL    LDL Calculated 90 <=129 mg/dL    Patient Fasting > 8hrs? Yes    Comprehensive Metabolic Panel   Result Value Ref Range    Sodium 143 136 - 145 mmol/L    Potassium 3.9 3.5 - 5.0 mmol/L    Chloride 108 (H) 98 - 107 mmol/L    CO2 28 22 - 31 mmol/L    Anion Gap, Calculation 7 5 - 18 mmol/L    Glucose 102 70 - 125 mg/dL    BUN 13 8 - 28 mg/dL    Creatinine 0.84 0.60 - 1.10 mg/dL    GFR MDRD Af Amer >60 >60 mL/min/1.73m2    GFR MDRD Non Af Amer >60 >60 mL/min/1.73m2    Bilirubin, Total 0.5 0.0 - 1.0 mg/dL    Calcium 8.9 8.5 - 10.5 mg/dL    Protein, Total 6.0 6.0 - 8.0 g/dL    Albumin 3.9 3.5 - 5.0 g/dL    Alkaline Phosphatase 70 45 - 120 U/L    AST 26 0 - 40 U/L    ALT 25 0 - 45 U/L       ASSESSMENT/PLAN:     Mixed hyperlipidemia [E78.2]      1. Mixed hyperlipidemia  - Lipid Cascade RANDOM  - Comprehensive Metabolic Panel  - simvastatin (ZOCOR) 20 MG tablet; Take 1 tablet (20 mg total) by mouth at bedtime.  Dispense: 90 tablet; Refill: 1    2. Prediabetes  - Comprehensive Metabolic Panel    3. Hypokalemia  - Comprehensive Metabolic Panel    4. Malignant neoplasm of upper-outer quadrant of right breast " in female, estrogen receptor negative (H)    5. Peripheral neuropathy due to chemotherapy (H)    Patient is overall doing well.  She does have a history of breast cancer but that seems to be relatively stable currently.  She has follow-up again with Dr. Mina in about a week or so.  She is due for mammogram in November 2021 again.  She does have a history of hyperlipidemia and is on Zocor.  She seems to be tolerating that medication well and does not any side effects from it.  We will go ahead and check metabolic panel as well as lipid panel today to follow-up on her Zocor usage.  She does need a refill of her medicine which was given today.  3-month supply was given with 1 refill which should take her through the next 6 months.  She is due to return in 6 months for follow-up of the hyperlipidemia.  She does have a history of prediabetes as well as hypokalemia and metabolic panel was drawn for that reason as well today.  She overall is feeling well.  She declines any kind of vaccinations today as she is getting get a Covid vaccination later today and then wants to finish up with her Covid vaccination series before she went have any other kind of immunization.  We did discuss shingles vaccination and she will call her insurance and find out about coverage for that and she also needs to make sure that she can afford it before she gets that.  She also was reminded that she is due for a tetanus vaccination in April 2021.  She declined hepatitis C testing today.  I did remind her that she is due for a physical exam in the near future.  Patient does definitely have peripheral neuropathy due to chemotherapy.  She walks with a very wide gait and has for quite some time.  This has not changed in any way.  We also discussed the fact that she is due for colonoscopy in May 2022.  We will see her back in 6 months for follow-up of her medications.  If she has additional problems or concerns prior to that will let me know.  I  will contact her with results of the lab work when it returns.  Christina Moore MD    This note was dictated using voice recognition software. Any grammatical errors, context distortions, or spelling errors are not intentional

## 2021-06-15 NOTE — PROGRESS NOTES
PROGRESS NOTE   1/19/2018    SUBJECTIVE:  Alissa Jansen is a 72 y.o. female  who presents for   Chief Complaint   Patient presents with     Establish Care     Patient comes in today for establish care visit.  She is previously been seen by Dr. Solorzano.  She was also scheduled today for an annual wellness visit however since she is new to me we will do an establish care visit today and then have her return for her annual wellness visit at a later date.  We went through all of her past medical history family history surgical history today and all was updated.  She notes that she does have a personal history of breast cancer but seems to be doing well thus far.  She sees Dr. Jerry as well as Dr. Schmidt.  She saw Dr. Jerry at the end of December and everything was fine in terms of her surgery and her cancer.  She is seeing Dr. Schmidt 2 weeks from now as well.  She notes that she does have neuropathy since finishing her chemotherapy and that sometimes makes it difficult for her to walk.  She does have a cane that she keeps in the car and because that her security blanket.  She does not really need to use it that much but it is there if she needs it.  The neuropathy seems to be stable and does not seem like is getting any worse.  She and Dr. Schmidt have been working through the neuropathy and again she thinks that things are doing okay.  She walks very slowly and carefully and seems to be fine.  She has neuropathy from her lower shin down to her toes bilaterally and in her fingertips as well.  She is proud to say that she is cancer free at this time.  She is feeling great and if it were not for the neuropathy she would be feeling 200%.  We did also go through all of her health maintenance today.  She had a DEXA scan in December 2016 and thus needs another one in December 2018.  She just had a mammogram in December 2017.  She had a colonoscopy in January 2014 and did find some polyps.  She needs a repeat of that  in 5 years and we discussed that at length today.  She does have a history of high cholesterol and is on simvastatin for that.  She does need a refill of that medication today as well as labs to follow-up on that medication.  She has previously been found to have pre-diabetes so we will go ahead and check the electrolytes today to follow-up on that as well as the history of hypokalemia which she says was primarily when she was on chemotherapy.  She is overall quite pleased with how well everything is going and is so thankful that she has come this far and is in such good health currently.    Patient Active Problem List   Diagnosis     Benign Adenomatous Polyp Of The Large Intestine     Prediabetes     Osteopenia     Mixed hyperlipidemia     Breast cancer of upper-outer quadrant of right female breast     Hypokalemia     Difficulty coping with disease     Peripheral neuropathy due to chemotherapy     Health education/counseling       Current Outpatient Prescriptions   Medication Sig Dispense Refill     calcium carbonate-vitamin D3 (CALCIUM 600 + D,3,) 600 mg calcium- 200 unit cap Take by mouth.       cholecalciferol, vitamin D3, 1,000 unit tablet Take 2,000 Units by mouth daily.       multivitamin with minerals (THERA-M) 9 mg iron-400 mcg Tab tablet Take 1 tablet by mouth daily.  0     OMEGA-3/DHA/EPA/FISH OIL (FISH OIL-OMEGA-3 FATTY ACIDS) 300-1,000 mg capsule Take 2 g by mouth daily.       simvastatin (ZOCOR) 20 MG tablet Take 1 tablet (20 mg total) by mouth at bedtime. 90 tablet 1     No current facility-administered medications for this visit.        No Known Allergies    Past Medical History:   Diagnosis Date     Breast cancer 12/2015    Right Breast, lumpectomy     Closed fracture of lateral malleolus of right ankle 09/02/2004    fell in a hole golfing.     Colon polyps 9/9/2008    2 polyps found on colonoscopy.     H/O colonoscopy 01/2014    repeat 5 years     Hx antineoplastic chemotherapy 01/2016    Right  "Breast     Hx of radiation therapy 06/2016    Right Breast     Hyperlipidemia      Menopause     1996     Osteoarthritis        Past Surgical History:   Procedure Laterality Date     BREAST BIOPSY Right 2015    dx breast cancer     BREAST LUMPECTOMY Right 01/18/2016    Dr. Jerry.     BREAST LUMPECTOMY Right 07/06/2016    Re-excision lumpectomy and port removal, Dr. Jerry     CATARACT EXTRACTION W/  INTRAOCULAR LENS IMPLANT Bilateral Left 11/19/13, Right 12/3/13     DENTAL SURGERY  ~2010     KNEE SURGERY Left 1975    For \"torn ligaments\"     SENTINEL LYMPH NODE BIOPSY Right 01/18/2016    Dr. Jerry.       History   Smoking Status     Never Smoker   Smokeless Tobacco     Never Used     Comment: No exposure       OBJECTIVE:     /70 (Patient Site: Right Arm, Patient Position: Sitting, Cuff Size: Adult Regular)  Pulse 74 Comment: regular  Temp 97.4  F (36.3  C) (Oral)   Resp 14 Comment: regular  Ht 5' 5.5\" (1.664 m)  Wt 170 lb (77.1 kg)  BMI 27.86 kg/m2    Physical Exam:  GENERAL APPEARANCE: A&A, NAD, well hydrated, well nourished  SKIN:  Normal skin turgor, no lesions/rashes   HEENT: moist mucous membranes, no rhinorrhea, PERRLA, TM's clear bilaterally, Throat without significant erythema or exudate.  NECK: Supple, full ROM, no significant lymphadenopathy or thyromegaly  CV: RRR, no M/G/R   LUNGS: CTAB  ABDOMEN: S&NT, no masses or organomegaly, positive bowel sounds   EXTREMITY: no swelling noted.  Full range of motion of all 4 extremities.   NEURO: no gross deficits   PSYCHIATRIC;  Mood appropriate, memory intact    LABS:     Recent Results (from the past 240 hour(s))   Comprehensive Metabolic Panel   Result Value Ref Range    Sodium 138 136 - 145 mmol/L    Potassium 4.2 3.5 - 5.0 mmol/L    Chloride 103 98 - 107 mmol/L    CO2 28 22 - 31 mmol/L    Anion Gap, Calculation 7 5 - 18 mmol/L    Glucose 103 70 - 125 mg/dL    BUN 12 8 - 28 mg/dL    Creatinine 0.75 0.60 - 1.10 mg/dL    GFR MDRD Af Amer >60 >60 " mL/min/1.73m2    GFR MDRD Non Af Amer >60 >60 mL/min/1.73m2    Bilirubin, Total 0.6 0.0 - 1.0 mg/dL    Calcium 9.6 8.5 - 10.5 mg/dL    Protein, Total 6.5 6.0 - 8.0 g/dL    Albumin 3.8 3.5 - 5.0 g/dL    Alkaline Phosphatase 96 45 - 120 U/L    AST 24 0 - 40 U/L    ALT 21 0 - 45 U/L   Lipid Cascade   Result Value Ref Range    Cholesterol 155 <=199 mg/dL    Triglycerides 58 <=149 mg/dL    HDL Cholesterol 56 >=50 mg/dL    LDL Calculated 87 <=129 mg/dL    Patient Fasting > 8hrs? Yes        ASSESSMENT/PLAN:     Mixed hyperlipidemia [E78.2]      1. Mixed hyperlipidemia  - Comprehensive Metabolic Panel  - Lipid Cascade  - simvastatin (ZOCOR) 20 MG tablet; Take 1 tablet (20 mg total) by mouth at bedtime.  Dispense: 90 tablet; Refill: 1    2. Osteopenia    3. Benign neoplasm of colon    4. Prediabetes  - Comprehensive Metabolic Panel    5. Malignant neoplasm of upper-outer quadrant of right breast in female, estrogen receptor negative    6. Hypokalemia  - Comprehensive Metabolic Panel    Patient overall seems to be doing very well.  She continues to see Dr. Jerry and Dr. Schmidt for her breast cancer.  Currently she is considering her self cancer free and is quite thankful for that.  She just recently saw Dr. Jerry and has an appointment with Dr. Schmidt in about 2 weeks.  She does have a history of hyperlipidemia and is on simvastatin.  We will check electrolytes today as well as lipid panel to follow-up on that.  She was given a refill of her simvastatin today.  We discussed the fact that she is due for colonoscopy in 2019 once been 5 years since she had her last one because of colon polyps that were found.  She does have a history of hypokalemia however that was primarily when she was on chemotherapy which she is no longer on but will recheck the electrolytes today anyway.  She does have a history of osteopenia needs her next DEXA scan in December 2018.  She overall feels like things are doing well.  I did discuss with her  that she should return for her annual wellness visit sometime in the future.  She notes that she sees Dr. Gonzalez and Dr. Schmidt and does not feel like she probably needs to come back for that visit.  I would like to see her for sure in about 6 months for follow-up of her medications and she is aware of that.  All of her questions were answered today.  If she has additional questions or concerns will certainly let me know.   Christina Moore MD

## 2021-06-15 NOTE — PROGRESS NOTES
[facility] Hematology and Oncology Progress Note    Patient: Alissa Jansen  MRN: [id number]  Date of Service: 02/05/2018      Reason for Visit    Follow-up of right-sided breast cancer.    Assessment and Plan  Breast cancer of upper-outer quadrant of right female breast    Primary site: Breast (Right)    Staging method: AJCC 7th Edition    Clinical: Stage IIA (T2, N0, cM0) signed by Kristy Mina MD on 1/27/2016  3:26 PM    Pathologic free text: Invasive ductal carcioma with metaplastic features.    Pathologic: Stage IIA (T2, N0, cM0) signed by Kristy Mina MD on 1/27/2016  3:25 PM    Summary: Stage IIA (T2, N0, cM0)    ECOG Performance   ECOG Performance Status: 1    Distress Assessment  Distress Assessment Score: No distress : No intervention is needed today.  Pain  Pain Score (Initial OR Reassessment): No/Denies Pain:     Ms. Alissa Jansen is a 72 y.o. 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  She had a lumpectomy and sentinel lymph node procedure done by Dr. Jerry on 1/18/16.  Final pathology showed that the cancer was invasive ductal carcinoma with metaplastic features, grade 3, triple negative, with a 3 mm focus involving the inferior margin.  Final stage was Stage IIA (T2, N0, cM0).  She started chemotherapy on 2/5/16 and completed 4 cycles of dose dense AC chemotherapy.  She had 12 weeks of Taxol chemotherapy and then underwent reresection on 7/6/16.  There was no residual cancer seen in pathology.  She completed adjuvant irradiation on 9/6/16.    1.  At this time there is no evidence of breast cancer recurrence.  She appears to be doing well from that point of view.  I discussed with her that she should continue with follow-up.  I think she should be seen every 3 months under she reaches 3 years and after that we can go to 6 monthly  follow-up.  She was agreeable to the plan.  She had a mammogram done in December 2017 which showed nothing concerning.  We will plan on obtaining another mammogram in December 2018.    2.  Her main concern is about the peripheral neuropathy.  Unfortunately symptoms appear to have remained the same over the last few months.  She had some initial improvement.  I encouraged her to continue with exercise etc. encouraged her to remain physically active.  Hopefully there will be some more improvement in the coming months but my hopes are not too high.  She understands that.  Unfortunately I have not heard of anything new that may help with the neuropathy.  She has already tried the Cymbalta.  At this point she does not think it is of any help.  She also tried acupuncture and she does not think it is making much difference for her.    3.  She has osteopenia.  She was advised to continue with calcium and vitamin D supplements.  We will plan on obtaining another DEXA scan at the end of this year.    4.  Follow-up: Return to clinic with MD or NP in 3 months.    Time spend >20 minutes face to face with the patient. More than 50 % in counseling and coordination of care.          Problem List    1. Malignant neoplasm of upper-outer quadrant of right breast in female, estrogen receptor negative     2. Peripheral neuropathy due to chemotherapy          CC: Christina Moore MD             ______________________________________________________________________________    History of Present Illness    Ms. Alissa Jansen is here for follow-up alone.  Again her only complaint is about underlying peripheral neuropathy.  She has some numbness in her fingertips and she says that she tends to drop things.  She is not very troubled about that.  She has persisting numbness in her feet and that makes it difficult for her to walk.  She has some unsteadiness when she walks.  There was significant improvement in her walking and she walks  around nowadays mostly unaided.  She just keeps the cane in her car just in case.  She feels that over the last 2 months there has been no improvement in the neuropathy.  She does have some tingling in her feet but she feels that is not troubling her much.  She just wishes that things would get better.  She has completed the live strong program and she goes to the  2 or 3 times a week to exercise.  That is going well.    She says that she ate quite well over the Christmas holidays and gained 4 pounds of weight.  She wants to get that weight off.    She has some pain on the bottom of her feet off and on.    She has not noticed any lumps or bumps anywhere.  No other aches or pains.  No unusual headaches.  Breathing is fine.  No chest pain or cough.  No nausea or vomiting or abdominal pain.  No constipation or diarrhea.  No vaginal bleeding.  No difficulty passing urine.  No skin rashes.    Please see below.  A 14 point review of system is otherwise completely negative.      Pain Status  Currently in Pain: No/denies    Review of Systems    Constitutional  Constitutional (WDL): Exceptions to WDL  Fatigue: None  Fever: None  Chills: None  Weight Gain: 5 - <10% from baseline (4# 1/19/18)  Weight Loss: None  Neurosensory  Neurosensory (WDL): Exceptions to WDL  Peripheral Motor Neuropathy: Asymptomatic, clinical or diagnostic observations only, intervention not indicated  Ataxia: Asymptomatic, clinical or diagnostic observations only, intervention not indicated  Peripheral Sensory Neuropathy: Asymptomatic, loss of deep tendon reflexes or paresthesia (bilat hands and feet)  Confusion: None  Syncope: None  Cardiovascular  Cardiovascular (WDL): Exceptions to WDL  Palpitations: None  Edema: No  Pulmonary  Respiratory (WDL): Within Defined Limits  Gastrointestinal  Gastrointestinal (WDL): Exceptions to WDL  Anorexia: None  Constipation: None  Diarrhea: None  Dysphagia: None  Esophagitis: None  Nausea: None  Pharyngitis:  "None  Vomiting: None  Dysgeusia: None  Dry Mouth: Symptomatic (e.g., dry or thick saliva) without significant dietary alteration, unstimulated saliva flow >0.2 ml/min  Genitourinary  Genitourinary (WDL): All genitourinary elements are within defined limits  Integumentary  Integumentary (WDL): All integumentary elements are within defined limits  Patient Coping  Patient Coping: Accepting  Distress Assessment  Distress Assessment Score: No distress  Accompanied by  Accompanied by: Alone    Past History  Past Medical History:   Diagnosis Date     Breast cancer 12/2015    Right Breast, lumpectomy     Closed fracture of lateral malleolus of right ankle 09/02/2004    fell in a hole golfing.     Colon polyps 9/9/2008    2 polyps found on colonoscopy.     H/O colonoscopy 01/2014    repeat 5 years     Hx antineoplastic chemotherapy 01/2016    Right Breast     Hx of radiation therapy 06/2016    Right Breast     Hyperlipidemia      Menopause     1996     Osteoarthritis          Past Surgical History:   Procedure Laterality Date     BREAST BIOPSY Right 2015    dx breast cancer     BREAST LUMPECTOMY Right 01/18/2016    Dr. Jerry.     BREAST LUMPECTOMY Right 07/06/2016    Re-excision lumpectomy and port removal, Dr. Jerry     CATARACT EXTRACTION W/  INTRAOCULAR LENS IMPLANT Bilateral Left 11/19/13, Right 12/3/13     DENTAL SURGERY  ~2010     KNEE SURGERY Left 1975    For \"torn ligaments\"     SENTINEL LYMPH NODE BIOPSY Right 01/18/2016    Dr. Jerry.       Physical Exam    Recent Vitals 2/5/2018   Height -   Weight 174 lbs 5 oz   BSA (m2) -   /84   Pulse 84   Temp 98.4   Temp src 1   SpO2 97   Some recent data might be hidden       GENERAL: Alert and oriented. Seated comfortably. In no distress.  She looks well overall.  She has gained just a little bit of weight.    HEAD: Atraumatic and normocephalic.  Has a full head of hair.    EYES: BRITNI, EOMI.  No pallor.  No icterus.    Oral cavity: no mucosal lesion or tonsillar " enlargement.    NECK: supple. JVP normal.  No thyroid enlargement.    LYMPH NODES: No palpable, cervical, axillary or inguinal lymphadenopathy.    CHEST: clear to auscultation bilaterally.  Resonant to percussion throughout bilaterally.  Symmetrical breath movements bilaterally.    CVS: S1 and S2 are heard. Regular rate and rhythm.  No murmur or gallop or rub heard.    ABDOMEN: Soft. Not tender. Not distended.  No palpable hepatomegaly or splenomegaly.  No other mass palpable.  Bowel sounds heard.    EXTREMITIES: Warm.  No peripheral edema.    SKIN: no rash, or bruising or purpura.    BREASTS:  Right breast: Normal in control.  The surgical scar has healed well.  There is no underlying induration.  The nipple looks normal.  The skin looks normal.  No palpable mass or tenderness in the right breast.  The right axilla is without mass or nodule.    Left breast: The contour looks normal.  The skin looks normal.  The nipple looks normal.  No palpable mass.  No tenderness.  The left axilla is without mass or tenderness.      Lab Results  No results found for this or any previous visit (from the past 168 hour(s)).  Results for CECILLE CHAVARRIA (MRN [id number]) as of 2/5/2018 14:08   Ref. Range 1/19/2018 10:32   Sodium Latest Ref Range: 136 - 145 mmol/L 138   Potassium Latest Ref Range: 3.5 - 5.0 mmol/L 4.2   Chloride Latest Ref Range: 98 - 107 mmol/L 103   CO2 Latest Ref Range: 22 - 31 mmol/L 28   Anion Gap, Calculation Latest Ref Range: 5 - 18 mmol/L 7   BUN Latest Ref Range: 8 - 28 mg/dL 12   Creatinine Latest Ref Range: 0.60 - 1.10 mg/dL 0.75   GFR MDRD Af Amer Latest Ref Range: >60 mL/min/1.73m2 >60   GFR MDRD Non Af Amer Latest Ref Range: >60 mL/min/1.73m2 >60   Calcium Latest Ref Range: 8.5 - 10.5 mg/dL 9.6   AST Latest Ref Range: 0 - 40 U/L 24   ALT Latest Ref Range: 0 - 45 U/L 21   ALBUMIN Latest Ref Range: 3.5 - 5.0 g/dL 3.8   Protein, Total Latest Ref Range: 6.0 - 8.0 g/dL 6.5   Cholesterol Latest Ref Range:  <=199 mg/dL 155   Alkaline Phosphatase Latest Ref Range: 45 - 120 U/L 96   Bilirubin, Total Latest Ref Range: 0.0 - 1.0 mg/dL 0.6   Triglycerides Latest Ref Range: <=149 mg/dL 58   HDL Cholesterol Latest Ref Range: >=50 mg/dL 56   LDL Calculated Latest Ref Range: <=129 mg/dL 87   Glucose Latest Ref Range: 70 - 125 mg/dL 103     Imaging  BILATERAL FULL-FIELD SCREENING DIGITAL MAMMOGRAMS  12/18/2017      INDICATION: Screening exam.  COMPARISON STUDIES: 12/14/2016 and 12/17/2015.     FINDINGS: Scattered fibroglandular densities. Postop changes right breast lumpectomy upper central aspect, unchanged from 12/14/2016. Small nodule within the lower outer aspect of the left breast is unchanged.     IMPRESSION:   Stable mammograms. No evidence for malignancy.      CAD was used in analysis of this exam.     ACR BI-RADS Category 2: Benign.  No results found.      Signed by: Kristy Mina MD

## 2021-06-15 NOTE — PROGRESS NOTES
[facility] Hematology and Oncology Progress Note    Patient: Alissa Jansen  MRN: [id number]  Date of Service: 02/23/2021      Reason for Visit    Follow-up of right-sided breast cancer.    Assessment and Plan  Breast cancer of upper-outer quadrant of right female breast    Primary site: Breast (Right)    Staging method: AJCC 7th Edition    Clinical: Stage IIA (T2, N0, cM0) signed by Kristy Mina MD on 1/27/2016  3:26 PM    Pathologic free text: Invasive ductal carcioma with metaplastic features.    Pathologic: Stage IIA (T2, N0, cM0) signed by Kristy Mina MD on 1/27/2016  3:25 PM    Summary: Stage IIA (T2, N0, cM0)    ECOG Performance   ECOG Performance Status: 0    Distress Assessment  Distress Assessment Score: No distress :     Pain   : No pain.    Ms. Alissa Jansen is a 75 y.o. 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  She had a lumpectomy and sentinel lymph node procedure done by Dr. Jerry on 1/18/16.  Final pathology showed that the cancer was invasive ductal carcinoma with metaplastic features, grade 3, triple negative, with a 3 mm focus involving the inferior margin.  Final stage was Stage IIA (T2, N0, cM0).  She started chemotherapy on 2/5/16 and completed 4 cycles of dose dense AC chemotherapy.  She had 12 weeks of Taxol chemotherapy and then underwent reresection on 7/6/16.  There was no residual cancer seen in pathology.  She completed adjuvant irradiation on 9/6/16.    1.  She appears to be doing well from the breast cancer point of view.  There are no suspicious symptoms.  Nothing suspicious is found on physical examination also.  She had a CMP drawn on 2/16/2021.  I reviewed the results with her.  Overall stable with regard to kidney function and liver function.    She had her last mammogram on 11/16/2020 which did not show  any suspicious finding.  The next mammogram will be due in November.  This was ordered today and she will schedule it according to her convenience.    I discussed with her that we will have 1 more follow-up in 6 months to see how she is doing.  At that point she will be 5 years out from surgery.  If she is doing well, then we can make a decision whether to go to a 1 year follow-up or if she wants she can revert to having her follow-up with primary care.  At this time she would like to continue follow-up with us once a year.    2.  She has peripheral neuropathy from chemotherapy.  Symptoms are now stabilized.  She does have a mild foot drop bilaterally, but a bit more marked on the right side.  She is now able to walk fairly steadily but cannot walk for long distances.  Does not have much in the way of pain or tingling.  She still has little bit of numbness left in her fingertips.  The finger symptoms do not cause much trouble.  At this point, I do not think there is much more improvement that we can expect.  Unfortunately there is no really effective treatment available that is likely to help her.  She is reconciled to this disability even though sometimes it frustrates her.    3.  Her blood pressure today was 173/93.  She does not have a diagnosis of hypertension.  In fact when she saw Dr. Moore 2 weeks ago, her blood pressure was not elevated.  I think she gets anxious when she has to see me.  Curious to see that her blood pressure did not come down even though our clinic has changed places.  Thus I think this is more related to meeting with me who she associates with the chemotherapy rather than the place itself.  I discussed with her that she needs to continue to have her blood pressure monitored because sometimes patients with whitecoat syndrome can develop real hypertension down the line.  She is aware of that.    4.  She had questions for me about the vitamin D supplementation.  I explained to her that the  maximum daily supplementation for vitamin D in ordinary cases is up to 2000 international units.  She is also taking calcium supplements for the osteopenia.    5.  Follow-up: Return to clinic with MD or NP in 6 months.    Time spend >30 minutes total time for the patient.         Problem List    1. Malignant neoplasm of upper-outer quadrant of right breast in female, estrogen receptor negative (H)  Mammo Screening Bilateral   2. 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 White coat syndrome without diagnosis of hypertension     4. Encounter for screening mammogram for malignant neoplasm of breast   Mammo Screening Bilateral        CC: Christina Moore MD             ______________________________________________________________________________    History of Present Illness    Ms. Alissa Jansen is here for follow-up alone.    She states that overall she feels that she is doing quite well.  She really has no new complaints.  She states that really the only problem that is troubling her is a persistent symptoms due to peripheral neuropathy.  She does not have much in the way of pain or tingling now.  Mostly just some numbness in her fingertips and the foot drop which makes it difficult for her to walk long distances.  She feels steady on her feet now.  No recent falls.  She feels that if the neuropathy was not there she could have been a lot more active.  That still frustrates her to some extent.  However she feels thankful that the cancer has not come back.    She shares the stories of several family members who had Covid infection.  Fortunately none of them became seriously ill.  She herself did not become ill at all.    She has not noticed any lumps or bumps anywhere.  No aches or pains anywhere.  Breathing is fine.  No chest pain or cough.  No unusual headaches.  No new neurological symptoms.  Eating well.  Bowel movements are normal.  No vaginal discharge.  No difficulty with urination.  No  skin rashes.    Please see below.  A 14 point review of system is otherwise completely negative.      Pain Status  Currently in Pain: No/denies    Review of Systems    Constitutional  Constitutional (WDL): Exceptions to WDL  Weight Loss: to <10% from baseline, intervention not indicated(2#)  Neurosensory  Peripheral Motor Neuropathy: Asymptomatic, clinical or diagnostic observations only, intervention not indicated  Peripheral Sensory Neuropathy: Asymptomatic, loss of deep tendon reflexes or paresthesia  Confusion: None  Syncope: None  Cardiovascular  Cardiovascular (WDL): All cardiovascular elements are within defined limits  Pulmonary  Respiratory (WDL): Within Defined Limits  Gastrointestinal  Gastrointestinal (WDL): All gastrointestinal elements are within defined limits  Genitourinary  Genitourinary (WDL): All genitourinary elements are within defined limits  Integumentary  Integumentary (WDL): All integumentary elements are within defined limits  Patient Coping  Patient Coping: Accepting  Distress Assessment  Distress Assessment Score: No distress  Accompanied by  Accompanied by: Alone    Past History  Past Medical History:   Diagnosis Date     Closed fracture of lateral malleolus of right ankle 09/02/2004    fell in a hole golfing.     Colon polyps 9/9/2008    2 polyps found on colonoscopy.     Colon polyps 05/17/2019    found on colonoscopy, adenomatous and sessile serrated adenoma, repeat colonscopy 3 yrs     Gastric erosions 01/28/2020    Reactive gastropathy.     H/O colonoscopy 01/2014    repeat 5 years     Hiatal hernia 01/28/2020     Hx antineoplastic chemotherapy 01/2016    Right Breast     Hx of radiation therapy 06/2016    Right Breast     Hyperlipidemia      Malignant neoplasm of upper-outer quadrant of right breast in female, estrogen receptor negative (H) 12/22/2015     Menopause     1996     Osteoarthritis          Past Surgical History:   Procedure Laterality Date     BREAST BIOPSY Right 2015  "   dx breast cancer     BREAST LUMPECTOMY Right 01/18/2016    Dr. Jerry.     BREAST LUMPECTOMY Right 07/06/2016    Re-excision lumpectomy and port removal, Dr. Jerry     CATARACT EXTRACTION W/  INTRAOCULAR LENS IMPLANT Bilateral Left 11/19/13, Right 12/3/13     COLONOSCOPY W/ POLYPECTOMY  05/16/2019     DENTAL SURGERY  ~2010     ESOPHAGOGASTRODUODENOSCOPY  01/28/2020    Large hiatal hernia.  Few stomach erosions.  Biopsy showed reactive gastropathy.  H. pylori negative.     KNEE SURGERY Left 1975    For \"torn ligaments\"     SENTINEL LYMPH NODE BIOPSY Right 01/18/2016    Dr. Jerry.       Physical Exam    Recent Vitals 2/23/2021   Height -   Weight 174 lbs   BSA (m2) 1.9 m2   /93   Pulse 82   Temp 98.7   Temp src 1   SpO2 98   Some recent data might be hidden       GENERAL: Alert and oriented to time place and person. Seated comfortably. In no distress.  She looks well overall.  She is bright and cheerful today.    No evident cranial nerve deficit.  Upper and lower extremity power appears to be normal.  I observed her walking.  She has a tendency to step high because of the mild foot drop.  Otherwise she appears to be steady on her feet.    HEAD: Atraumatic and normocephalic.    EYES: BRITNI, EOMI.  No pallor.  No icterus.    Oral cavity: no mucosal lesion or tonsillar enlargement.    NECK: supple. JVP normal.  No thyroid enlargement.    LYMPH NODES: No palpable, cervical, axillary or inguinal lymphadenopathy.    CHEST: clear to auscultation bilaterally.  Resonant to percussion throughout bilaterally.  Symmetrical breath movements bilaterally.    CVS: S1 and S2 are heard. Regular rate and rhythm.  No murmur or gallop or rub heard.  No peripheral edema.    ABDOMEN: Soft. Not tender. Not distended.  No palpable hepatomegaly or splenomegaly.  No other mass palpable.  Bowel sounds heard.    EXTREMITIES: Warm.    SKIN: no rash, or bruising or purpura.  Has a full head of hair.    BREASTS:  Right breast: Normal in " contour.  Surgical scar has healed well.  No underlying induration.  Nipple looks normal.  Skin looks normal.  There is no palpable mass or tenderness in the right breast.  The right axilla is without mass or nodule.    Left breast: Contour looks normal.  No palpable mass.  No tenderness.  Nipple looks normal.  Skin looks normal.  Left axilla is without mass or nodule.      Lab Results  Recent Results (from the past 240 hour(s))   Lipid Cascade RANDOM   Result Value Ref Range    Cholesterol 157 <=199 mg/dL    Triglycerides 58 <=149 mg/dL    HDL Cholesterol 55 >=50 mg/dL    LDL Calculated 90 <=129 mg/dL    Patient Fasting > 8hrs? Yes    Comprehensive Metabolic Panel   Result Value Ref Range    Sodium 143 136 - 145 mmol/L    Potassium 3.9 3.5 - 5.0 mmol/L    Chloride 108 (H) 98 - 107 mmol/L    CO2 28 22 - 31 mmol/L    Anion Gap, Calculation 7 5 - 18 mmol/L    Glucose 102 70 - 125 mg/dL    BUN 13 8 - 28 mg/dL    Creatinine 0.84 0.60 - 1.10 mg/dL    GFR MDRD Af Amer >60 >60 mL/min/1.73m2    GFR MDRD Non Af Amer >60 >60 mL/min/1.73m2    Bilirubin, Total 0.5 0.0 - 1.0 mg/dL    Calcium 8.9 8.5 - 10.5 mg/dL    Protein, Total 6.0 6.0 - 8.0 g/dL    Albumin 3.9 3.5 - 5.0 g/dL    Alkaline Phosphatase 70 45 - 120 U/L    AST 26 0 - 40 U/L    ALT 25 0 - 45 U/L           Imaging      No results found.      Signed by: Kristy Mina MD

## 2021-06-17 NOTE — PROGRESS NOTES
[facility] Hematology and Oncology Progress Note    Patient: Alissa Jansen  MRN: [id number]  Date of Service: 05/07/2018      Reason for Visit    Follow-up of right-sided breast cancer.    Assessment and Plan  Breast cancer of upper-outer quadrant of right female breast    Primary site: Breast (Right)    Staging method: AJCC 7th Edition    Clinical: Stage IIA (T2, N0, cM0) signed by Kristy Mina MD on 1/27/2016  3:26 PM    Pathologic free text: Invasive ductal carcioma with metaplastic features.    Pathologic: Stage IIA (T2, N0, cM0) signed by Kristy Mina MD on 1/27/2016  3:25 PM    Summary: Stage IIA (T2, N0, cM0)    ECOG Performance   ECOG Performance Status: 0    Distress Assessment  Distress Assessment Score: No distress : No intervention is needed today.  Pain  Pain Score (Initial OR Reassessment): 2:     Ms. Alissa Jansen is a 72 y.o. 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  She had a lumpectomy and sentinel lymph node procedure done by Dr. Jerry on 1/18/16.  Final pathology showed that the cancer was invasive ductal carcinoma with metaplastic features, grade 3, triple negative, with a 3 mm focus involving the inferior margin.  Final stage was Stage IIA (T2, N0, cM0).  She started chemotherapy on 2/5/16 and completed 4 cycles of dose dense AC chemotherapy.  She had 12 weeks of Taxol chemotherapy and then underwent reresection on 7/6/16.  There was no residual cancer seen in pathology.  She completed adjuvant irradiation on 9/6/16.    1.  At this time I see no evidence of breast cancer recurrence from either history or physical examination.  Overall she appears to be doing well.  We will continue with follow-up.  I discussed with her that we will continue with 3 monthly follow-up under she is 3 years out from the previous section.  Since  she had a triple negative breast cancer, the chance of recurrence of falls sharply after 3 years.  She was agreeable to the plan.    2.  She will need a mammogram in December 2018.    3.  Her main concern is about the peripheral neuropathy as a result of chemotherapy.  Unfortunately the symptoms have plateaued.  In the last year or so we have not really seen any improvement.  I discussed with her that at this point I think her symptoms are going to remain about the same.  Unfortunately I cannot think of anything else that we can do to improve the situation.  I discussed with her that if I hear of any new trials of any promising treatments I would let her know.  She voiced understanding.    4.  She has osteopenia.  We will plan on ordering a DEXA scan in the next visit so she can have one done by the end of this year.  Meanwhile she is to continue with calcium and vitamin D supplementation.    5.  Return to clinic with me or Reggie Osorio NP in 3 months.    Time spend >20 minutes face to face with the patient. More than 50 % in counseling and coordination of care.          Problem List    1. Malignant neoplasm of upper-outer quadrant of right breast in female, estrogen receptor negative     2. Peripheral neuropathy due to chemotherapy          CC: Christina Moore MD             ______________________________________________________________________________    History of Present Illness    Ms. Alissa Jansen is here for follow-up alone.  She says that she was staining her deck today and after that she feels really tired.  She says that overall she has poor stamina nowadays.  She says that earlier when she could go to 5 stores shopping, nowadays she can manage only 3 stools.  Her only ongoing complaint is about continuing problems with peripheral neuropathy in her feet and a bit in her hands.  Recently she was trying to open a canister of pain and it was difficult for her.  This made her feel really  "frustrated.  She says that the neuropathy symptoms have certainly not progressed but there is no improvement either.  The only thing that she would really like to do and is not able to do because the neuropathy is to go back to golfing.  She does not think that she has coordination to do that anymore.    She does not complain of any pain other than the pins and needles sensation from the neuropathy in her feet.  No aches or pains elsewhere.    She has not noticed any lumps or bumps anywhere.  She is not losing weight.  No unusual headaches.  No eyesight problems.  No mouth sores.  No swallowing difficulty.  No nausea or vomiting.  No abdominal pain.  No constipation or diarrhea.  No blood in stool or black stools.  No skin rashes.  No shortness of breath.  No chest pain or cough.    Please see below.  A 14 point review of system is otherwise completely negative.      Pain Status  Currently in Pain: Yes    Review of Systems    Constitutional  Constitutional (WDL): Exceptions to WDL  Fatigue: Fatigue relieved by rest (\"less stamina\")  Weight Gain: 5 - <10% from baseline (trending up)  Neurosensory  Neurosensory (WDL): Exceptions to WDL  Peripheral Motor Neuropathy: Moderate symptoms, limiting instrumental ADL (hands and feet)  Ataxia: Moderate symptoms, limiting instrumental ADL  Peripheral Sensory Neuropathy: Moderate symptoms, limiting instrumental ADL (hands and feet - stable)  Cardiovascular  Cardiovascular (WDL): Exceptions to WDL  Edema: Yes  Edema Limbs: 5 - 10% inter-limb discrepancy in volume or circumference at point of greatest visible difference, swelling or obscuration of anatomic architecture on close inspection (feet at night)  Pulmonary  Respiratory (WDL): Within Defined Limits  Gastrointestinal  Gastrointestinal (WDL): All gastrointestinal elements are within defined limits  Genitourinary  Genitourinary (WDL): All genitourinary elements are within defined limits  Integumentary  Integumentary (WDL): " "All integumentary elements are within defined limits  Patient Coping  Patient Coping: Accepting;Open/discussion  Distress Assessment  Distress Assessment Score: No distress  Accompanied by  Accompanied by: Alone    Past History  Past Medical History:   Diagnosis Date     Breast cancer 12/2015    Right Breast, lumpectomy     Closed fracture of lateral malleolus of right ankle 09/02/2004    fell in a hole golfing.     Colon polyps 9/9/2008    2 polyps found on colonoscopy.     H/O colonoscopy 01/2014    repeat 5 years     Hx antineoplastic chemotherapy 01/2016    Right Breast     Hx of radiation therapy 06/2016    Right Breast     Hyperlipidemia      Menopause     1996     Osteoarthritis          Past Surgical History:   Procedure Laterality Date     BREAST BIOPSY Right 2015    dx breast cancer     BREAST LUMPECTOMY Right 01/18/2016    Dr. Jerry.     BREAST LUMPECTOMY Right 07/06/2016    Re-excision lumpectomy and port removal, Dr. Jerry     CATARACT EXTRACTION W/  INTRAOCULAR LENS IMPLANT Bilateral Left 11/19/13, Right 12/3/13     DENTAL SURGERY  ~2010     KNEE SURGERY Left 1975    For \"torn ligaments\"     SENTINEL LYMPH NODE BIOPSY Right 01/18/2016    Dr. Jerry.       Physical Exam    Recent Vitals 5/7/2018   Height -   Weight 176 lbs 10 oz   BSA (m2) -   /86   Pulse 94   Temp 99.8   Temp src 1   SpO2 94   Some recent data might be hidden       GENERAL: Alert and oriented. Seated comfortably. In no distress.  She looks well overall.  She is still a bit unsteady on her feet.  However she is able to transfer to the examining table much more easily now.    HEAD: Atraumatic and normocephalic.  Has a full head of hair.    EYES: BRITNI, EOMI.  No pallor.  No icterus.    Oral cavity: no mucosal lesion or tonsillar enlargement.    NECK: supple. JVP normal.  No thyroid enlargement.    LYMPH NODES: No palpable, cervical, axillary or inguinal lymphadenopathy.    CHEST: clear to auscultation bilaterally.  Resonant to " percussion throughout bilaterally.  Symmetrical breath movements bilaterally.    CVS: S1 and S2 are heard. Regular rate and rhythm.  No murmur or gallop or rub heard.    ABDOMEN: Soft. Not tender. Not distended.  No palpable hepatomegaly or splenomegaly.  No other mass palpable.  Bowel sounds heard.    EXTREMITIES: Warm.  No peripheral edema.    SKIN: no rash, or bruising or purpura.    BREASTS:  Right breast: Normal in contour.  The surgical scar has healed well.  There is no underlying induration.  The nipple looks normal.  The skin looks normal.  No palpable mass or tenderness in the right breast.  The right axilla is without mass or nodule.    Left breast: Contour looks normal.  The skin looks normal.  The nipple looks normal.  No palpable mass.  No tenderness.  Left axilla is without mass or tenderness.      Lab Results  No results found for this or any previous visit (from the past 168 hour(s)).      Imaging      No results found.      Signed by: Kristy Mina MD

## 2021-06-18 NOTE — PATIENT INSTRUCTIONS - HE
Patient Instructions by Christina Moore MD at 2/7/2020  9:40 AM     Author: Christina Moore MD Service: -- Author Type: Physician    Filed: 2/7/2020  9:52 AM Encounter Date: 2/7/2020 Status: Addendum    : Christina Moore MD (Physician)    Related Notes: Original Note by Christina Moore MD (Physician) filed at 2/7/2020  9:52 AM         Patient Education   Understanding USDA MyPlate  The USDA (US Department of Agriculture) has guidelines to help you make healthy food choices. These are called MyPlate. MyPlate shows the food groups that make up healthy meals using the image of a place setting. Before you eat, think about the healthiest choices for what to put onto your plate or into your cup or bowl. To learn more about building a healthy plate, visit www.choosemyplate.gov.       The Food Groups    Fruits: Any fruit or 100% fruit juice counts as part of the Fruit Group. Fruits may be fresh, canned, frozen, or dried, and may be whole, cut-up, or pureed. Make half your plate fruits and vegetables.    Vegetables: Any vegetable or 100% vegetable juice counts as a member of the Vegetable Group. Vegetables may be fresh, frozen, canned, or dried. They can be served raw or cooked and may be whole, cut-up, or mashed. Make half your plate fruits and vegetables.     Grains: All foods made from grains are part of the Grains Group. These include wheat, rice, oats, cornmeal, and barley such as bread, pasta, oatmeal, cereal, tortillas, and grits. Grains should be no more than a quarter of your plate. At least half of your grains should be whole grains.    Protein: This group includes meat, poultry, seafood, beans and peas, eggs, processed soy products (like tofu), nuts (including nut butters), and seeds. Make protein choices no more than a quarter of your plate. Meat and poultry choices should be lean or low fat.    Dairy: All fluid milk products and foods made from milk that contain calcium, like yogurt and  cheese are part of the Dairy Group. (Foods that have little calcium, such as cream, butter, and cream cheese, are not part of the group.) Most dairy choices should be low-fat or fat-free.    Oils: These are fats that are liquid at room temperature. They include canola, corn, olive, soybean, and sunflower oil. Foods that are mainly oil include mayonnaise, certain salad dressings, and soft margarines. You should have only 5 to 7 teaspoons of oils a day. You probably already get this much from the food you eat.  Use [employer]akPhone to Help Build Your Meals  The reportbraincker can help you plan and track your meals and activity. You can look up individual foods to see or compare their nutritional value. You can get guidelines for what and how much you should eat. You can compare your food choices. And you can assess personal physical activities and see ways you can improve. Go to www.Acc[employer] Radar.gov/K2 Mediacker/.    7413-6653 [employer]. [address] [place] [address]. All rights reserved. This information is not intended as a substitute for professional medical care. Always follow your healthcare professional's instructions.           Patient Education   Preventing Falls in the Home  As you get older, falls are more likely. Thats because your reaction time slows. Your muscles and joints may also get stiffer, making them less flexible. Illness, medications, and vision changes can also affect your balance. A fall could leave you unable to live on your own. To make your home safer, follow these tips:    Floors    Put nonskid pads under area rugs.    Remove throw rugs.    Replace worn floor coverings.    Tack carpets firmly to each step on carpeted stairs. Put nonskid strips on the edges of uncarpeted stairs.    Keep floors and stairs free of clutter and cords.    Arrange furniture so there are clear pathways.    Clean up any spills right away.    Bathrooms    Install grab bars in the tub or  shower.    Apply nonskid strips or put a nonskid rubber mat in the tub or shower.    Sit on a bath chair to bathe.    Use bathmats with nonskid backing.    Lighting    Keep a flashlight in each room.    Put a nightlight along the pathway between the bedroom and the bathroom.    3969-6183 [employer]. [address] [place] [address]. All rights reserved. This information is not intended as a substitute for professional medical care. Always follow your healthcare professional's instructions.           Advance Directive  Patients advance directive was discussed and I am comfortable with the patients wishes.  Patient Education   Personalized Prevention Plan  You are due for the preventive services outlined below.  Your care team is available to assist you in scheduling these services.  If you have already completed any of these items, please share that information with your care team to update in your medical record.  Health Maintenance   Topic Date Due   ? ZOSTER VACCINES (2 of 3) 08/05/2011   ? MEDICARE ANNUAL WELLNESS VISIT  12/20/2017   ? FALL RISK ASSESSMENT  04/05/2020   ? MAMMOGRAM  11/13/2020   ? DXA SCAN  01/03/2021   ? TD 18+ HE  04/14/2021   ? ADVANCE CARE PLANNING  12/20/2021   ? COLONOSCOPY  05/17/2022   ? LIPID  04/05/2024   ? PNEUMOCOCCAL IMMUNIZATION 65+ LOW/MEDIUM RISK  Completed   ? INFLUENZA VACCINE RULE BASED  Completed

## 2021-06-19 NOTE — PROGRESS NOTES
[facility] Hematology and Oncology Progress Note    Patient: Alissa Jansen  MRN: [id number]  Date of Service: 08/08/2018      Reason for Visit    Follow-up of right-sided breast cancer.    Assessment and Plan  Breast cancer of upper-outer quadrant of right female breast    Primary site: Breast (Right)    Staging method: AJCC 7th Edition    Clinical: Stage IIA (T2, N0, cM0) signed by Kristy Mina MD on 1/27/2016  3:26 PM    Pathologic free text: Invasive ductal carcioma with metaplastic features.    Pathologic: Stage IIA (T2, N0, cM0) signed by Kristy Mina MD on 1/27/2016  3:25 PM    Summary: Stage IIA (T2, N0, cM0)    ECOG Performance   ECOG Performance Status: 0    Distress Assessment  Distress Assessment Score: No distress : No intervention is needed today.  Pain   : None.    Ms. Alissa Jansen is a 73 y.o. 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  She had a lumpectomy and sentinel lymph node procedure done by Dr. Jerry on 1/18/16.  Final pathology showed that the cancer was invasive ductal carcinoma with metaplastic features, grade 3, triple negative, with a 3 mm focus involving the inferior margin.  Final stage was Stage IIA (T2, N0, cM0).  She started chemotherapy on 2/5/16 and completed 4 cycles of dose dense AC chemotherapy.  She had 12 weeks of Taxol chemotherapy and then underwent reresection on 7/6/16.  There was no residual cancer seen in pathology.  She completed adjuvant irradiation on 9/6/16.    1.  This time I see no evidence of breast cancer recurrence.  Nothing significant that is new on history or physical.  We had another long discussion about triple negative breast cancer and the chance of recurrence.  We discussed about the frequency of follow-up.  I recommended that we should continue with follow-up every 3 months until she  reaches 3 years when the risk of recurrence from triple negative breast cancer comes down drastically.  She was agreeable to the plan.  She had a lot of questions about the follow-up modalities.  I explained to her that it has been shown that doing CT scans etc. on a frequent basis without any symptoms is of no real benefit.  She was accepting of that.    2.  The main concern is again about peripheral neuropathy.  I discussed with her that there is really not much point in trying all untested extremities.  They may not be beneficial.  They can also cause other problems.  She would like to consider the possibility of the trial [id number] using the  agent.  The trial appears to be ongoing at the [facility].  However the child seems to be for diabetic peripheral neuropathy.  I am not sure whether she would be eligible because she has chemotherapy-induced peripheral neuropathy.  I will ask our navigator to explore and let her know.    3.  She will need a mammogram in December 2018.  I have ordered it and she will schedule it according to her convenience.    4.  For the osteopenia that she has she will need a follow-up DEXA scan done at the end of this year.  That is also ordered today.  She will schedule it in December according to her convenience.    5.  Return to clinic with me or Reggie Osorio NP in 3 months.    Time spend >25 minutes face to face with the patient. More than 50 % in counseling and coordination of care.        Problem List    1. Malignant neoplasm of upper-outer quadrant of right breast in female, estrogen receptor negative (H)  Mammo Screening Bilateral   2. 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 Osteopenia  DXA Bone Density Scan    DXA Bone Density Scan   4. Encounter for screening mammogram for malignant neoplasm of breast   Mammo Screening Bilateral        CC: Christina Moore,  MD             ______________________________________________________________________________    History of Present Illness    Ms. Alissa Jansen is here for follow-up alone.  Her main concern is still about the peripheral neuropathy symptoms in her feet.  She has a bit of tingling in her fingertips also but that does not trouble her much.  The peripheral neuropathy in her feet makes it difficult for her to walk long distances and to walk for us.  She has not had any recent falls.  This limits her activity and she really wishes that there is something else that could be done for the neuropathy.  She has not perceived any change in the recent months.  She has had about a clinical trial where a viral agent called the  has been used for neuropathy.  The trial appears to have been for diabetic neuropathy.  She wonders whether she would be eligible for treatment with that.    She has not noticed any lumps or bumps anywhere.  No aches or pains anywhere.  Overall energy remains the same.  No unusual headaches.  No eyesight problems.  No mouth sores.  No swallowing difficulty.  Weight has remained stable.  No difficulty breathing.  No chest pain or cough.  No nausea or vomiting or abdominal pain.  No constipation or diarrhea.  No blood in stool or black stools.  No vaginal bleeding.  No difficulty with urination.  No skin rashes.    Please see below.  A 14 point review of system is otherwise completely negative.      Pain Status  Currently in Pain: No/denies    Review of Systems    Constitutional  Constitutional (WDL): All constitutional elements are within defined limits  Neurosensory  Neurosensory (WDL): Exceptions to WDL  Peripheral Motor Neuropathy: Asymptomatic, clinical or diagnostic observations only, intervention not indicated  Ataxia: Asymptomatic, clinical or diagnostic observations only, intervention not indicated  Peripheral Sensory Neuropathy: Asymptomatic, loss of deep tendon reflexes or paresthesia  "(numbness mid knuckle on fingers. numbness bottom of feet and toes)  Cardiovascular  Cardiovascular (WDL): All cardiovascular elements are within defined limits  Pulmonary  Respiratory (WDL): Within Defined Limits  Gastrointestinal  Gastrointestinal (WDL): All gastrointestinal elements are within defined limits  Genitourinary  Genitourinary (WDL): All genitourinary elements are within defined limits  Integumentary  Integumentary (WDL): All integumentary elements are within defined limits  Patient Coping  Patient Coping: Accepting;Open/discussion  Distress Assessment  Distress Assessment Score: No distress  Accompanied by  Accompanied by: Alone    Past History  Past Medical History:   Diagnosis Date     Breast cancer (H) 12/2015    Right Breast, lumpectomy     Closed fracture of lateral malleolus of right ankle 09/02/2004    fell in a hole golfing.     Colon polyps 9/9/2008    2 polyps found on colonoscopy.     H/O colonoscopy 01/2014    repeat 5 years     Hx antineoplastic chemotherapy 01/2016    Right Breast     Hx of radiation therapy 06/2016    Right Breast     Hyperlipidemia      Menopause     1996     Osteoarthritis          Past Surgical History:   Procedure Laterality Date     BREAST BIOPSY Right 2015    dx breast cancer     BREAST LUMPECTOMY Right 01/18/2016    Dr. Jerry.     BREAST LUMPECTOMY Right 07/06/2016    Re-excision lumpectomy and port removal, Dr. Jerry     CATARACT EXTRACTION W/  INTRAOCULAR LENS IMPLANT Bilateral Left 11/19/13, Right 12/3/13     DENTAL SURGERY  ~2010     KNEE SURGERY Left 1975    For \"torn ligaments\"     SENTINEL LYMPH NODE BIOPSY Right 01/18/2016    Dr. Jerry.       Physical Exam    Recent Vitals 8/8/2018   Height 5' 5.5\"   Weight 176 lbs 11 oz   BSA (m2) 1.93 m2   /86   Pulse 82   Temp -   Temp src -   SpO2 95   Some recent data might be hidden       GENERAL: Alert and oriented. Seated comfortably. In no distress.  She looks well overall.  She has a high stepping gait " when she walks because of the neuropathy causing a little bit of foot drop.  However she looks steady when she walks.    HEAD: Atraumatic and normocephalic.  Has a full head of hair.    EYES: BRITNI, EOMI.  No pallor.  No icterus.    Oral cavity: no mucosal lesion or tonsillar enlargement.    NECK: supple. JVP normal.  No thyroid enlargement.    LYMPH NODES: No palpable, cervical, axillary or inguinal lymphadenopathy.    CHEST: clear to auscultation bilaterally.  Resonant to percussion throughout bilaterally.  Symmetrical breath movements bilaterally.    CVS: S1 and S2 are heard. Regular rate and rhythm.  No murmur or gallop or rub heard.    ABDOMEN: Soft. Not tender. Not distended.  No palpable hepatomegaly or splenomegaly.  No other mass palpable.  Bowel sounds heard.    EXTREMITIES: Warm.  No peripheral edema.    SKIN: no rash, or bruising or purpura.    BREASTS:  Right breast: Normal in control.  The surgical scar is healed well.  No underlying induration.  The nipple looks normal.  Skin looks normal.  No palpable mass or tenderness in the right breast.  The right axilla is without mass or nodule.    Left breast: Contour looks normal.  The skin looks normal.  The nipple looks normal.  No palpable mass.  No tenderness.  Left axilla is without mass or tenderness.      Lab Results  No results found for this or any previous visit (from the past 168 hour(s)).      Imaging      No results found.      Signed by: Kristy Mina MD

## 2021-06-19 NOTE — PROGRESS NOTES
"I met with Pat today prior to her visit with Dr. Mina.  She said she tries to maintain a good, positive attitude but is still dealing with the lingering peripheral neuropathy following her chemotherapy treatments.  She talked of this being her new normal and how it sometimes gets her down, she tries to not let that happen.  She has good support in her daughter and her family and her friends.  Support and encouragement provided.  She mentioned a clinical trial at the [facility] \"A Phase III, Double-Blind, Randomized, Placebo-Controlled, Multicenter Study to Assess the Safety and Efficacy of  in Subjects With Painful Diabetic Peripheral Neuropathy\".  She wondered if she would be eligible.  I emailed the contact for the study asking if they would be willing to screen her despite her neuropathy being caused by chemotherapy vs diabetes.  Will notify Pat when I hear back.      "

## 2021-06-20 NOTE — LETTER
Letter by Christina Moore MD at      Author: Christina Moore MD Service: -- Author Type: --    Filed:  Encounter Date: 8/17/2020 Status: (Other)         Alissa Jansen  [address] [facility] [address]             August 17, 2020         Dear Ms. Jansen,    Below are the results from your recent visit:    Resulted Orders   Comprehensive Metabolic Panel   Result Value Ref Range    Sodium 140 136 - 145 mmol/L    Potassium 4.1 3.5 - 5.0 mmol/L    Chloride 105 98 - 107 mmol/L    CO2 28 22 - 31 mmol/L    Anion Gap, Calculation 7 5 - 18 mmol/L    Glucose 93 70 - 125 mg/dL    BUN 12 8 - 28 mg/dL    Creatinine 0.82 0.60 - 1.10 mg/dL    GFR MDRD Af Amer >60 >60 mL/min/1.73m2    GFR MDRD Non Af Amer >60 >60 mL/min/1.73m2    Bilirubin, Total 0.6 0.0 - 1.0 mg/dL    Calcium 9.2 8.5 - 10.5 mg/dL    Protein, Total 6.2 6.0 - 8.0 g/dL    Albumin 4.1 3.5 - 5.0 g/dL    Alkaline Phosphatase 60 45 - 120 U/L    AST 29 0 - 40 U/L    ALT 33 0 - 45 U/L    Narrative    Fasting Glucose reference range is 70-99 mg/dL per  American Diabetes Association (ADA) guidelines.   Lipid Cascade   Result Value Ref Range    Cholesterol 156 <=199 mg/dL    Triglycerides 55 <=149 mg/dL    HDL Cholesterol 55 >=50 mg/dL    LDL Calculated 90 <=129 mg/dL    Patient Fasting > 8hrs? Yes          Please call with questions or contact us using [employer].    Sincerely,        Electronically signed by Christina Moore MD

## 2021-06-20 NOTE — PROGRESS NOTES
PROGRESS NOTE   9/5/2018    SUBJECTIVE:  Alissa Jansen is a 73 y.o. female  who presents for   Chief Complaint   Patient presents with     Hyperlipidemia     Patient comes in today for follow-up of her chronic medical issues.  She does have history of hyperlipidemia.  She continues on Zocor for that.  She is tolerating that well.  She is not having any side effects to that medication.  She is due for labs to be drawn including electrolytes and lipid panel which will be done today. She does not need a refill of zocor at this time.   She overall notes that she is feeling well.  She continues to have neuropathy in her feet and her hands due to chemotherapy for breast cancer.  She has been seeing the oncologist on a regular basis and he notes that there is really nothing else that they can do to help with the neuropathy.  She is due for a DEXA scan and a mammogram.  She knew that she was due for the mammogram in December 2018 however was uncertain as to when her last DEXA scan was.  We did go through that and realize that her last DEXA scan was in 12/22/2016 and therefore she will schedule both mammogram as well as DEXA scan for the end of December.  She is due for colonoscopy in January 2019.  She would prefer to wait and order that at her next follow-up visit.  She will get a flu shot in November when she sees Dr. Schmidt.  She has had a history of low potassium in the past and will go ahead and check electrolytes in regards to that she is also been noted to have pre-diabetes and will check electrolytes to check on her glucose as well.    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       cholecalciferol, vitamin D3, 1,000 unit tablet Take 2,000 Units by mouth daily.       multivitamin with minerals (THERA-M) 9 mg iron-400 mcg Tab tablet Take 1 tablet by mouth daily.  0     OMEGA-3/DHA/EPA/FISH OIL (FISH OIL-OMEGA-3 FATTY ACIDS) 300-1,000 mg capsule Take 2 g by mouth daily.       simvastatin (ZOCOR) 20 MG tablet Take 1 tablet (20 mg total) by mouth at bedtime. 90 tablet 0     No current facility-administered medications for this visit.        No Known Allergies    Past Medical History:   Diagnosis Date     Breast cancer (H) 12/2015    Right Breast, lumpectomy     Closed fracture of lateral malleolus of right ankle 09/02/2004    fell in a hole golfing.     Colon polyps 9/9/2008    2 polyps found on colonoscopy.     H/O colonoscopy 01/2014    repeat 5 years     Hx antineoplastic chemotherapy 01/2016    Right Breast     Hx of radiation therapy 06/2016    Right Breast     Hyperlipidemia      Menopause     1996     Osteoarthritis        Past Surgical History:   Procedure Laterality Date     BREAST BIOPSY Right 2015    dx breast cancer     BREAST LUMPECTOMY Right 01/18/2016    Dr. Jerry.     BREAST LUMPECTOMY Right 07/06/2016    Re-excision lumpectomy and port removal, Dr. Jerry     CATARACT EXTRACTION W/  INTRAOCULAR LENS IMPLANT Bilateral Left 11/19/13, Right 12/3/13     DENTAL SURGERY  ~2010     KNEE SURGERY Left 1975    For \"torn ligaments\"     SENTINEL LYMPH NODE BIOPSY Right 01/18/2016    Dr. Jerry.       History   Smoking Status     Never Smoker   Smokeless Tobacco     Never Used     Comment: No exposure       OBJECTIVE:     /72 (Patient Site: Right Arm, Patient Position: Sitting, Cuff Size: Adult Regular)  Pulse 83  Wt 177 lb 2 oz (80.3 kg)  SpO2 98%  Breastfeeding? No  BMI 29.03 " kg/m2    Physical Exam:  GENERAL APPEARANCE: A&A, NAD, well hydrated, well nourished  SKIN:  Normal skin turgor, no lesions/rashes   CV: RRR, no M/G/R   LUNGS: CTAB  EXTREMITY: no swelling noted.  Full range of motion of all 4 extremities.   NEURO: no gross deficits   PSYCHIATRIC;  Mood appropriate, memory intact    LABS:     Recent Results (from the past 240 hour(s))   Comprehensive Metabolic Panel   Result Value Ref Range    Sodium 143 136 - 145 mmol/L    Potassium 4.5 3.5 - 5.0 mmol/L    Chloride 108 (H) 98 - 107 mmol/L    CO2 25 22 - 31 mmol/L    Anion Gap, Calculation 10 5 - 18 mmol/L    Glucose 95 70 - 125 mg/dL    BUN 11 8 - 28 mg/dL    Creatinine 0.76 0.60 - 1.10 mg/dL    GFR MDRD Af Amer >60 >60 mL/min/1.73m2    GFR MDRD Non Af Amer >60 >60 mL/min/1.73m2    Bilirubin, Total 0.5 0.0 - 1.0 mg/dL    Calcium 9.5 8.5 - 10.5 mg/dL    Protein, Total 6.5 6.0 - 8.0 g/dL    Albumin 3.6 3.5 - 5.0 g/dL    Alkaline Phosphatase 92 45 - 120 U/L    AST 31 0 - 40 U/L    ALT 23 0 - 45 U/L   Lipid Cascade   Result Value Ref Range    Cholesterol 196 <=199 mg/dL    Triglycerides 68 <=149 mg/dL    HDL Cholesterol 54 >=50 mg/dL    LDL Calculated 128 <=129 mg/dL    Patient Fasting > 8hrs? Yes        ASSESSMENT/PLAN:     Mixed hyperlipidemia [E78.2]      1. Mixed hyperlipidemia  - Comprehensive Metabolic Panel  - Lipid Cascade    2. Prediabetes  - Comprehensive Metabolic Panel    3. Hypokalemia  - Comprehensive Metabolic Panel    4. Screen for colon cancer    Patient overall seems to be doing well.  She continues on Zocor for her cholesterol.  She does need labs to be drawn today and electrolytes and lipid panel were drawn.  Will contact her with results of that when it returns.  Electrolytes were also drawn to follow-up on her pre-diabetes as well as her hypokalemia.  She knows that she is due for a colonoscopy in January 2019.  She would like to have that ordered the next time she comes in rather than ordering it now in  anticipation of the need for that in January.  She already has a mammogram and a DEXA scan ordered and will get those at the end of December when it has been 2 years since her last DEXA scan.  She has follow-up with Dr. Schmidt in regards to her cancer in November and will get a flu shot at that time.  When she needs refills of her medications she certainly will let me know.  All of her questions were answered today.  Christina Moore MD

## 2021-06-20 NOTE — LETTER
Letter by Christina Moore MD at      Author: Christina Moore MD Service: -- Author Type: --    Filed:  Encounter Date: 8/17/2020 Status: (Other)        Alissa Jansen  [address] [facility] [address]             August 17, 2020         Dear Alissa Jansen,    Below are the results from your recent visit:            Please call with questions or contact us using [employer].    Sincerely,        Electronically signed by Christina Moore MD

## 2021-06-21 NOTE — PROGRESS NOTES
[facility] Hematology and Oncology Progress Note    Patient: Alissa Jansen  MRN: [id number]  Date of Service: 11/09/2018      Reason for Visit    Follow-up of right-sided breast cancer.    Assessment and Plan  Breast cancer of upper-outer quadrant of right female breast    Primary site: Breast (Right)    Staging method: AJCC 7th Edition    Clinical: Stage IIA (T2, N0, cM0) signed by Kristy Mina MD on 1/27/2016  3:26 PM    Pathologic free text: Invasive ductal carcioma with metaplastic features.    Pathologic: Stage IIA (T2, N0, cM0) signed by Kristy Mina MD on 1/27/2016  3:25 PM    Summary: Stage IIA (T2, N0, cM0)    ECOG Performance   ECOG Performance Status: 0    Distress Assessment  Distress Assessment Score: No distress : No intervention is needed today.  Pain   : Neuropathic pains in her legs off and on.  She does not want any new treatment.    Ms. Alissa Jansen is a 73 y.o. 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  She had a lumpectomy and sentinel lymph node procedure done by Dr. Jerry on 1/18/16.  Final pathology showed that the cancer was invasive ductal carcinoma with metaplastic features, grade 3, triple negative, with a 3 mm focus involving the inferior margin.  Final stage was Stage IIA (T2, N0, cM0).  She started chemotherapy on 2/5/16 and completed 4 cycles of dose dense AC chemotherapy.  She had 12 weeks of Taxol chemotherapy and then underwent reresection on 7/6/16.  There was no residual cancer seen in pathology.  She completed adjuvant irradiation on 9/6/16.    1.  She appears to be doing well from the breast cancer point of view.  She really has no complaints that would be suspicious for breast cancer recurrence.  Nothing significant found on physical examination either.  She has had a bilateral mammogram done on 11/5/2018.   No new suspicious findings.  She was glad to see the results.    2.  We will plan on repeating a mammogram in November 2019.    3.  She is going to have her DEXA scan done in early January.    4.  We discussed about follow-up of the breast cancer.  I think we should continue with 3 monthly follow-up until July 2019, when she completes 3 years from the second surgery.  At that point her risk of recurrence would have come down quite well.  We can then go to 6 monthly follow-up.  She was in agreement with the plan.    5.  Regarding the peripheral neuropathy symptoms, she really does not want to try Cymbalta again for the occasional pains that she has in her legs.  Unfortunately we really do not have any other preventative treatments available.  She has tried acupuncture also.  At this time, I think the only thing that we can really do is to try to manage things well so that she remains safe.  She was in agreement with this plan.    6.  Given the seasonal flu vaccine today.    7.  Follow-up: Return to clinic with MD or NP in 3 months.      Time spend >25 minutes face to face with the patient. More than 50 % in counseling and coordination of care.        Problem List    1. Malignant neoplasm of upper-outer quadrant of right breast in female, estrogen receptor negative (H)     2. Peripheral neuropathy due to chemotherapy (H)          CC: Christina Moore MD             ______________________________________________________________________________    History of Present Illness    Ms. Alissa Jansen is here for follow-up alone.    She says that her only complaint is of continued peripheral neuropathy symptoms.  She has some numbness and even more of a tingling in her feet.  She says that occasionally she has some charley horse-like pains at night when she is trying to sleep.  She will need to get up and stand for the pain to go away.  She is always hoping that there will be some new treatment available for the  peripheral neuropathy but none are available.  This is very disappointing for her.  She feels that she is restricted in all the activities that she can do because of this.  On the other hand she remains socially quite active.    Otherwise she says that she is doing quite well.  She has not noticed any lumps or bumps anywhere.  No aches or pains.  She is eating well.  Breathing is fine.  No chest pain or cough.  No unusual headaches.  No difficulty with eating.  No nausea or vomiting.  No abdominal pain.  No constipation and no diarrhea.  No blood in stool or black stools.  No skin rashes.  No vaginal bleeding.    Please see below.  A 14 point review of system is otherwise completely negative.    Pain Status  Currently in Pain: No/denies    Review of Systems    Constitutional  Constitutional (WDL): Exceptions to WDL  Fatigue: Fatigue relieved by rest  Neurosensory  Neurosensory (WDL): Exceptions to WDL  Peripheral Motor Neuropathy: Moderate symptoms, limiting instrumental ADL  Ataxia: Asymptomatic, clinical or diagnostic observations only, intervention not indicated  Peripheral Sensory Neuropathy: Moderate symptoms, limiting instrumental ADL  Cardiovascular  Cardiovascular (WDL): All cardiovascular elements are within defined limits  Pulmonary  Respiratory (WDL): Within Defined Limits  Gastrointestinal  Gastrointestinal (WDL): All gastrointestinal elements are within defined limits  Genitourinary  Genitourinary (WDL): All genitourinary elements are within defined limits  Integumentary  Integumentary (WDL): All integumentary elements are within defined limits  Patient Coping  Patient Coping: Open/discussion;Accepting  Distress Assessment  Distress Assessment Score: No distress  Accompanied by  Accompanied by: Alone    Past History  Past Medical History:   Diagnosis Date     Breast cancer (H) 12/2015    Right Breast, lumpectomy     Closed fracture of lateral malleolus of right ankle 09/02/2004    fell in a hole  "golfing.     Colon polyps 9/9/2008    2 polyps found on colonoscopy.     H/O colonoscopy 01/2014    repeat 5 years     Hx antineoplastic chemotherapy 01/2016    Right Breast     Hx of radiation therapy 06/2016    Right Breast     Hyperlipidemia      Menopause     1996     Osteoarthritis          Past Surgical History:   Procedure Laterality Date     BREAST BIOPSY Right 2015    dx breast cancer     BREAST LUMPECTOMY Right 01/18/2016    Dr. Jerry.     BREAST LUMPECTOMY Right 07/06/2016    Re-excision lumpectomy and port removal, Dr. Jerry     CATARACT EXTRACTION W/  INTRAOCULAR LENS IMPLANT Bilateral Left 11/19/13, Right 12/3/13     DENTAL SURGERY  ~2010     KNEE SURGERY Left 1975    For \"torn ligaments\"     SENTINEL LYMPH NODE BIOPSY Right 01/18/2016    Dr. Jerry.       Physical Exam    Recent Vitals 11/9/2018   Height -   Weight 179 lbs 11 oz   BSA (m2) -   /77   Pulse 89   Temp 98.1   Temp src -   SpO2 94   Some recent data might be hidden       GENERAL: Alert and oriented. Seated comfortably. In no distress.  She looks well overall.  Has gained a little bit of weight.    HEAD: Atraumatic and normocephalic.  Has a full head of hair.    EYES: BRITNI, EOMI.  No pallor.  No icterus.    Oral cavity: no mucosal lesion or tonsillar enlargement.    NECK: supple. JVP normal.  No thyroid enlargement.    LYMPH NODES: No palpable, cervical, axillary or inguinal lymphadenopathy.    CHEST: clear to auscultation bilaterally.  Resonant to percussion throughout bilaterally.  Symmetrical breath movements bilaterally.    CVS: S1 and S2 are heard. Regular rate and rhythm.  No murmur or gallop or rub heard.    ABDOMEN: Soft. Not tender. Not distended.  No palpable hepatomegaly or splenomegaly.  No other mass palpable.  Bowel sounds heard.    EXTREMITIES: Warm.  No peripheral edema.    SKIN: no rash, or bruising or purpura.    BREASTS:  Right breast: Normal in control.  The surgical scar is healed well.  There is no underlying " induration.  The nipple looks normal.  Skin looks normal.  There is no palpable mass or tenderness in the right breast.  The right axilla is without mass or nodule.    Left breast: Contour looks normal.  The skin looks normal.  Nipple looks normal.  There is no palpable mass.  No tenderness.  The left axilla is without mass or tenderness.          Lab Results  No results found for this or any previous visit (from the past 168 hour(s)).      Imaging      Mammo Screening Bilateral    Result Date: 11/5/2018  BILATERAL FULL FIELD DIGITAL SCREENING MAMMOGRAM Performed on: 11/5/18 Compared to: 12/18/2017 Mammo Screening Bilateral, and 12/14/2016 Mammo Diagnostic Bilateral Findings: The breasts have scattered areas of fibroglandular densities.  There are postsurgical lumpectomy changes in the right breast. No evidence for malignancy and no change from the previous exam(s). This study was evaluated with the assistance of Computer-Aided Detection. Continue routine screening mammogram as recommended. ACR BI-RADS Category 2: Benign Findings         Signed by: Kristy Mina MD

## 2021-06-23 NOTE — PROGRESS NOTES
Patient here ambulatory for follow up on her breast cancer.  Patient continues to have post chemotherapy neuropathies.  Seen by Dr. Mina.

## 2021-06-23 NOTE — PROGRESS NOTES
[facility] Hematology and Oncology Progress Note    Patient: Alissa Jansen  MRN: [id number]  Date of Service: 02/08/2019      Reason for Visit    Follow-up of right-sided breast cancer.    Assessment and Plan  Breast cancer of upper-outer quadrant of right female breast    Primary site: Breast (Right)    Staging method: AJCC 7th Edition    Clinical: Stage IIA (T2, N0, cM0) signed by Kristy Mina MD on 1/27/2016  3:26 PM    Pathologic free text: Invasive ductal carcioma with metaplastic features.    Pathologic: Stage IIA (T2, N0, cM0) signed by Kristy Mina MD on 1/27/2016  3:25 PM    Summary: Stage IIA (T2, N0, cM0)    ECOG Performance   ECOG Performance Status: 1    Distress Assessment  Distress Assessment Score: No distress : No intervention is needed today.  Pain   : Neuropathic pains in her legs off and on.  She does not want any new treatment.    Ms. Alissa Jansen is a 73 y.o. 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  She had a lumpectomy and sentinel lymph node procedure done by Dr. Jerry on 1/18/16.  Final pathology showed that the cancer was invasive ductal carcinoma with metaplastic features, grade 3, triple negative, with a 3 mm focus involving the inferior margin.  Final stage was Stage IIA (T2, N0, cM0).  She started chemotherapy on 2/5/16 and completed 4 cycles of dose dense AC chemotherapy.  She had 12 weeks of Taxol chemotherapy and then underwent reresection on 7/6/16.  There was no residual cancer seen in pathology.  She completed adjuvant irradiation on 9/6/16.    1.  She appears to be doing well from the breast cancer point of view.  At this point we do not see any evidence of breast cancer recurrence.  She is happy about that.  She will need the next mammogram to be scheduled in November 2019.    2.  Peripheral neuropathy  unfortunately appears unchanged.  We have tried various modalities of treatment for the peripheral neuropathy with no improvement.  I do not think this is going to change in the future.  She knows that.  She finds it emotionally difficult but she is accepting of the reality.  Emotional support was given.    3.  I discussed with her about the results of the DEXA scan that she had done on 1/3/19.  He still shows moderate osteopenia with moderate fracture risk.  Overall there is a mixed picture with improvement in bone density in the spine but some worsening in the neck of femur.  I offered a referral to the osteoporosis clinic which she does not feel ready for.  I asked her to continue taking calcium and vitamin D tablets daily and trying to walk for exercise.  We will plan on repeating a DEXA scan in 2 years (2021).    4.  Return to clinic with MD or NP in 3 months.  I think after 6 months if she is doing well we can go to 6 monthly follow-up.    Time spend >25 minutes face to face with the patient. More than 50 % in counseling and coordination of care.          Problem List    1. Malignant neoplasm of upper-outer quadrant of right breast in female, estrogen receptor negative (H)     2. 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 Osteopenia          CC: Christina Moore MD             ______________________________________________________________________________    History of Present Illness    Ms. Alissa CONNELL Jansen he is here for follow-up alone.    Her complaints are only about the peripheral neuropathy symptoms which persist.  She feels it may be the sensitivity in her fingertips have improved somewhat but she does not think that there is any real change that she can feel in her feet.  She continues to have numbness and tingling and difficulty walking as a result of peripheral neuropathy.  She really feels that her quality of life is impacted because of the peripheral neuropathy.  On the other hand  she feels thankful that the breast cancer has not recurred.    She has not noticed any lumps or bumps anywhere.  No aches or pains anywhere.  No unusual headaches.  No difficulty breathing.  No chest pain or cough.  No nausea or vomiting or abdominal pain.  No constipation or diarrhea.  No blood in stool or black stools.  No vaginal discharge.  No difficulty with urination.    Please see below.  A 14 point review of system is otherwise completely negative.      Pain Status  Currently in Pain: No/denies    Review of Systems    Constitutional  Constitutional (WDL): Exceptions to WDL  Fatigue: Fatigue relieved by rest(less stamina)  Neurosensory  Neurosensory (WDL): Exceptions to WDL  Peripheral Motor Neuropathy: Asymptomatic, clinical or diagnostic observations only, intervention not indicated(hands & feet)  Ataxia: Asymptomatic, clinical or diagnostic observations only, intervention not indicated  Peripheral Sensory Neuropathy: Asymptomatic, loss of deep tendon reflexes or paresthesia(hands & feet)  Cardiovascular  Cardiovascular (WDL): Exceptions to WDL  Edema: Yes  Edema Limbs: 5 - 10% inter-limb discrepancy in volume or circumference at point of greatest visible difference, swelling or obscuration of anatomic architecture on close inspection(occ feet at night)  Pulmonary  Respiratory (WDL): Within Defined Limits  Gastrointestinal  Gastrointestinal (WDL): All gastrointestinal elements are within defined limits  Genitourinary  Genitourinary (WDL): All genitourinary elements are within defined limits  Integumentary  Integumentary (WDL): All integumentary elements are within defined limits  Patient Coping  Patient Coping: Accepting  Distress Assessment  Distress Assessment Score: No distress  Accompanied by  Accompanied by: Alone    Past History  Past Medical History:   Diagnosis Date     Breast cancer (H) 12/2015    Right Breast, lumpectomy     Closed fracture of lateral malleolus of right ankle 09/02/2004    fell in  "a hole golfing.     Colon polyps 9/9/2008    2 polyps found on colonoscopy.     H/O colonoscopy 01/2014    repeat 5 years     Hx antineoplastic chemotherapy 01/2016    Right Breast     Hx of radiation therapy 06/2016    Right Breast     Hyperlipidemia      Menopause     1996     Osteoarthritis          Past Surgical History:   Procedure Laterality Date     BREAST BIOPSY Right 2015    dx breast cancer     BREAST LUMPECTOMY Right 01/18/2016    Dr. Jerry.     BREAST LUMPECTOMY Right 07/06/2016    Re-excision lumpectomy and port removal, Dr. Jerry     CATARACT EXTRACTION W/  INTRAOCULAR LENS IMPLANT Bilateral Left 11/19/13, Right 12/3/13     DENTAL SURGERY  ~2010     KNEE SURGERY Left 1975    For \"torn ligaments\"     SENTINEL LYMPH NODE BIOPSY Right 01/18/2016    Dr. Jerry.       Physical Exam    Recent Vitals 2/8/2019   Height -   Weight 178 lbs 13 oz   BSA (m2) 1.94 m2   /84   Pulse 89   Temp 97.6   Temp src 1   SpO2 95   Some recent data might be hidden       GENERAL: Alert and oriented. Seated comfortably. In no distress.  She looks well overall.    HEAD: Atraumatic and normocephalic.  Has a full head of hair.    EYES: BRITNI, EOMI.  No pallor.  No icterus.    Oral cavity: no mucosal lesion or tonsillar enlargement.    NECK: supple. JVP normal.  No thyroid enlargement.    LYMPH NODES: No palpable, cervical, axillary or inguinal lymphadenopathy.    CHEST: clear to auscultation bilaterally.  Resonant to percussion throughout bilaterally.  Symmetrical breath movements bilaterally.    CVS: S1 and S2 are heard. Regular rate and rhythm.  No murmur or gallop or rub heard.    ABDOMEN: Soft. Not tender. Not distended.  No palpable hepatomegaly or splenomegaly.  No other mass palpable.  Bowel sounds heard.    EXTREMITIES: Warm.  No peripheral edema.    SKIN: no rash, or bruising or purpura.    BREASTS:  Right breast: Normal in contour.  The surgical scar is healed well.  No underlying induration.  Nipple looks normal.  " Skin looks normal.  There is no palpable mass or tenderness in the right breast.  The right axilla is without mass or nodule.    Left breast: Contour looks normal.  Skin looks normal.  Nipple looks normal.  There is no palpable mass.  No tenderness.  Left axilla is without mass or tenderness.      Lab Results  No results found for this or any previous visit (from the past 168 hour(s)).      Imaging      No results found.      Signed by: Kristy Mina MD

## 2021-07-13 ENCOUNTER — RECORDS - HEALTHEAST (OUTPATIENT)
Dept: ADMINISTRATIVE | Facility: CLINIC | Age: 76
End: 2021-07-13

## 2021-07-21 ENCOUNTER — RECORDS - HEALTHEAST (OUTPATIENT)
Dept: ADMINISTRATIVE | Facility: CLINIC | Age: 76
End: 2021-07-21

## 2021-08-24 ENCOUNTER — ONCOLOGY VISIT (OUTPATIENT)
Dept: ONCOLOGY | Facility: CLINIC | Age: 76
End: 2021-08-24
Payer: COMMERCIAL

## 2021-08-24 VITALS
DIASTOLIC BLOOD PRESSURE: 103 MMHG | HEIGHT: 65 IN | BODY MASS INDEX: 30.21 KG/M2 | HEART RATE: 91 BPM | TEMPERATURE: 98.9 F | RESPIRATION RATE: 16 BRPM | WEIGHT: 181.3 LBS | SYSTOLIC BLOOD PRESSURE: 161 MMHG | OXYGEN SATURATION: 100 %

## 2021-08-24 DIAGNOSIS — T45.1X5A PERIPHERAL NEUROPATHY DUE TO CHEMOTHERAPY (H): ICD-10-CM

## 2021-08-24 DIAGNOSIS — M94.9 DISORDER OF BONE AND CARTILAGE: ICD-10-CM

## 2021-08-24 DIAGNOSIS — G62.0 PERIPHERAL NEUROPATHY DUE TO CHEMOTHERAPY (H): ICD-10-CM

## 2021-08-24 DIAGNOSIS — C50.411 MALIGNANT NEOPLASM OF UPPER-OUTER QUADRANT OF RIGHT BREAST IN FEMALE, ESTROGEN RECEPTOR NEGATIVE (H): Primary | ICD-10-CM

## 2021-08-24 DIAGNOSIS — Z78.0 POST-MENOPAUSAL: ICD-10-CM

## 2021-08-24 DIAGNOSIS — Z17.1 MALIGNANT NEOPLASM OF UPPER-OUTER QUADRANT OF RIGHT BREAST IN FEMALE, ESTROGEN RECEPTOR NEGATIVE (H): Primary | ICD-10-CM

## 2021-08-24 DIAGNOSIS — M89.9 DISORDER OF BONE AND CARTILAGE: ICD-10-CM

## 2021-08-24 PROCEDURE — 99214 OFFICE O/P EST MOD 30 MIN: CPT | Performed by: INTERNAL MEDICINE

## 2021-08-24 PROCEDURE — G0463 HOSPITAL OUTPT CLINIC VISIT: HCPCS

## 2021-08-24 ASSESSMENT — PAIN SCALES - GENERAL: PAINLEVEL: NO PAIN (0)

## 2021-08-24 ASSESSMENT — MIFFLIN-ST. JEOR: SCORE: 1308.25

## 2021-08-24 NOTE — LETTER
"    8/24/2021         RE: Alissa Jansen  [address] [facility], [place] [address]        Dear Colleague,    Thank you for referring your patient, Alissa Jansen, to the [facility]CER [facility]. Please see a copy of my visit note below.    Oncology Rooming Note    August 24, 2021 1:53 PM   Alissa Jansen is a 76 year old female who presents for:    Chief Complaint   Patient presents with     Oncology Clinic Visit     Initial Vitals: BP (!) 161/103 (BP Location: Right arm, Cuff Size: Adult Regular)   Pulse 91   Temp 98.9  F (37.2  C) (Oral)   Resp 16   Wt 82.2 kg (181 lb 4.8 oz)   SpO2 100%   BMI 30.17 kg/m   Estimated body mass index is 30.17 kg/m  as calculated from the following:    Height as of 2/7/20: 1.651 m (5' 5\").    Weight as of this encounter: 82.2 kg (181 lb 4.8 oz). Body surface area is 1.94 meters squared.  No Pain (0) Comment: Data Unavailable   No LMP recorded.  Allergies reviewed: Yes  Medications reviewed: Yes    Medications: Medication refills not needed today.  Pharmacy name entered into [employer]: [facility] [address] [place] [address]    Clinical concerns: None.    Katelynn Arredondo                [facility] Hematology and Oncology Progress Note    Patient: Alissa Jansen  MRN: [id number]  Date of Service: Aug 24, 2021         Reason for Visit    Follow-up regarding right-sided breast cancer.    Assessment and Plan    Cancer Staging  Breast cancer of upper-outer quadrant of right female breast    Primary site: Breast (Right)    Staging method: AJCC 7th Edition    Clinical: Stage IIA (T2, N0, cM0) signed by Kristy Mina MD on 1/27/2016  3:26 PM    Pathologic free text: Invasive ductal carcioma with metaplastic features.    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    She had a lumpectomy and sentinel lymph node procedure done by Dr. Jerry on 1/18/16.    Final pathology showed that the cancer was invasive ductal carcinoma with metaplastic features, grade 3, triple negative, with a 3 mm focus involving the inferior margin.  Final stage was Stage IIA (T2, N0, cM0).    She started chemotherapy on 2/5/16 and completed 4 cycles of dose dense AC chemotherapy.  She had 12 weeks of Taxol chemotherapy and then underwent reresection on 7/6/16.  There was no residual cancer seen in pathology.  She completed adjuvant irradiation on 9/6/16.    1.  The breast cancer point of view, she appears to be doing quite well.  No suspicious findings in the physical examination.  She does have a bit of skin thickening left over from the radiation over her right breast which remains about the same as in the previous visit.  Surgical scar is healed well.  She really gives no symptoms suggestive of breast cancer recurrence.  She is scheduled to have this years mammogram done on 11/18/2021.  I asked her to keep that appointment.    2.  We discussed about follow-up.  She is now 5 years out from completion of treatment.  At this time the chance of recurrence of the breast cancer is quite low.  I discussed with her that at this point she can change over to follow-up with primary care or with us once a year.  She was feeling a bit anxious about that.  Finally we decided that we will ask for a follow-up appointment in a year's time but in the interim if she starts feeling any trouble, she can certainly call and be seen earlier.    3.  She will continue to need a mammogram once a year.    4.  She had osteopenia in the last DEXA scan that was done on 1/3/2019.  Since it is more than 2 years  out, I recommended repeating a DEXA scan.  She was agreeable.  She will have it scheduled sometime this year.  If that shows progression to osteoporosis, plan to refer her to osteoporosis care.  Meanwhile I reminded her to continue taking calcium and vitamin D tablets daily.    5.  She has already received the coronavirus vaccine.  Reminded her that she should have the flu vaccine when it becomes available in the coming months.    6.  Peripheral neuropathy: This is due to chemotherapy.  Unfortunately the last 2 or 3 doses of the Taxol chemotherapy caused sudden increase in the neuropathy.  Improvement after completion of chemotherapy was only partial.  She is left with a mild right foot drop.  Symptoms remain stable.  Unfortunately after so many years from chemotherapy, it is unlikely to improve much more.  She is quite aware of that.  Encouraged her to remain physically active.    7.  Follow-up: Return to clinic with MD or NP in 1 year.    Time spend >30 minutes total time for the patient.           Encounter Diagnoses:    Problem List Items Addressed This Visit        Nervous and Auditory    Peripheral neuropathy due to chemotherapy (H)       Musculoskeletal and Integumentary    Osteopenia    Relevant Orders    DX Hip/Pelvis/Spine       Other    Malignant neoplasm of upper-outer quadrant of right breast in female, estrogen receptor negative (H) - Primary      Other Visit Diagnoses     Post-menopausal        Relevant Orders    DX Hip/Pelvis/Spine             CC: Christina Moore MD   ______________________________________________________________________________    History of Present Illness    Ms. Alissa Jansen is here for follow-up alone.    Overall she feels that she is doing okay.  Her only complaint again is that the peripheral neuropathy symptoms in her feet remain about the same.  However there has not been any worsening.  She still has some difficulty walking around because she still has a mild  residual foot drop.  However she remains active.  She does not feel that she has slowed down at all.  In fact she feels that maybe she is walking a little bit better.  She feels that her right foot is turning in and so she is going to see a podiatrist next week.    She would like me to check out the right breast again.  She wants to be sure that everything feels the same.  Still feels a scarring in the right breast after the surgery and radiation.    Review of systems.  No fever or night sweats.  No loss of weight.  No lumps or bumps anywhere.  No unusual headaches or eyesight issues.  No dizziness.  No bleeding from the nose.  No sores in the mouth. No problems with swallowing.  No chest pain. No shortness of breath. No cough.  No abdominal pain. No nausea or vomiting.  No diarrhea or constipation.  No blood in stool or black colored stools.  No problems passing urine.  No vaginal discharge.  No numbness or tingling in hands or feet.  No skin rashes.  A 14 point review of systems is otherwise negative.        Past History    Past Medical History:   Diagnosis Date     Closed fracture of lateral malleolus of right ankle 09/02/2004    fell in a hole golfing.     Colon polyps 09/09/2008    2 polyps found on colonoscopy.     Gastric erosions 01/28/2020    Reactive gastropathy.     H/O colonoscopy 01/01/2014    repeat 5 years     Hiatal hernia 01/28/2020     Hx antineoplastic chemotherapy 01/01/2016    Right Breast     Hx of radiation therapy 06/01/2016    Right Breast     Hyperlipidemia      Malignant neoplasm of upper-outer quadrant of right breast in female, estrogen receptor negative (H) 12/22/2015     Menopause     1996     Osteoarthritis        Past Surgical History:   Procedure Laterality Date     BIOPSY BREAST Right 2015    dx breast cancer     COLONOSCOPY W/ POLYPECTOMY  05/16/2019     DENTAL SURGERY      2010     ESOPHAGOSCOPY, GASTROSCOPY, DUODENOSCOPY (EGD), COMBINED  01/28/2020    Large hiatal hernia.  Few  "stomach erosions.  Biopsy showed reactive gastropathy.  H. pylori negative.     KNEE SURGERY Left 1975    For \"torn ligaments\"     LUMPECTOMY BREAST Right 01/18/2016    Dr. Jerry.     LUMPECTOMY BREAST Right 07/06/2016    Re-excision lumpectomy and port removal, Dr. Jerry     PHACOEMULSIFICATION CLEAR CORNEA WITH STANDARD INTRAOCULAR LENS IMPLANT Bilateral Left 11/19/13, Right 12/3/13     SENTINEL LYMPH NODE BIOPSY Right 01/18/2016    Dr. Jerry.         Physical Exam    BP (!) 161/103 (BP Location: Right arm, Cuff Size: Adult Regular)   Pulse 91   Temp 98.9  F (37.2  C) (Oral)   Resp 16   Ht 1.651 m (5' 5\")   Wt 82.2 kg (181 lb 4.8 oz)   SpO2 100%   BMI 30.17 kg/m        GENERAL: Alert and oriented to time place and person. Seated comfortably. In no distress.  She looks well overall.  Normal build.  Very pleasant.    She still has a tendency to step height because of her mild foot drop especially on the right side.  Overall gait appears to be fairly steady now.    HEAD: Atraumatic and normocephalic.    EYES: BRITNI, EOMI.  No pallor.  No icterus.    Oral cavity: no mucosal lesion or tonsillar enlargement.    NECK: supple. JVP normal.  No thyroid enlargement.    LYMPH NODES: No palpable, cervical, axillary or inguinal lymphadenopathy.    CHEST: clear to auscultation bilaterally.  Resonant to percussion throughout bilaterally.  Symmetrical breath movements bilaterally.    CVS: S1 and S2 are heard. Regular rate and rhythm.  No murmur or gallop or rub heard.  No peripheral edema.    ABDOMEN: Soft. Not tender. Not distended.  No palpable hepatomegaly or splenomegaly.  No other mass palpable.  Bowel sounds heard.    EXTREMITIES: Warm.    SKIN: no rash, or bruising or purpura.  Has a full head of hair.    BREASTS:  Right breast: Normal in contour.  Surgical scar has healed well.  Nipple looks normal.  Skin has a little bit of thickening left over after the radiation.  No palpable mass or tenderness in the right " breast.  The right axilla is without mass or nodule.    Left breast: Contour looks normal.  No palpable mass.  No tenderness.  Nipple looks normal.  Skin looks normal.  Left axilla is without mass or nodule.    Lab Results    No results found for this or any previous visit (from the past 168 hour(s)).    Imaging    No results found.      Signed by: Kristy Mina MD        Again, thank you for allowing me to participate in the care of your patient.        Sincerely,        Kristy Mina MD

## 2021-08-24 NOTE — PROGRESS NOTES
"Oncology Rooming Note    August 24, 2021 1:53 PM   Alissa Jansen is a 76 year old female who presents for:    Chief Complaint   Patient presents with     Oncology Clinic Visit     Initial Vitals: BP (!) 161/103 (BP Location: Right arm, Cuff Size: Adult Regular)   Pulse 91   Temp 98.9  F (37.2  C) (Oral)   Resp 16   Wt 82.2 kg (181 lb 4.8 oz)   SpO2 100%   BMI 30.17 kg/m   Estimated body mass index is 30.17 kg/m  as calculated from the following:    Height as of 2/7/20: 1.651 m (5' 5\").    Weight as of this encounter: 82.2 kg (181 lb 4.8 oz). Body surface area is 1.94 meters squared.  No Pain (0) Comment: Data Unavailable   No LMP recorded.  Allergies reviewed: Yes  Medications reviewed: Yes    Medications: Medication refills not needed today.  Pharmacy name entered into [employer]: [facility] [address] [place] [address] [address] [place] [address]    Clinical concerns: None.    Katelynn Arredondo              "

## 2021-08-24 NOTE — PROGRESS NOTES
[facility] Hematology and Oncology Progress Note    Patient: Alissa Jansen  MRN: [id number]  Date of Service: Aug 24, 2021         Reason for Visit    Follow-up regarding right-sided breast cancer.    Assessment and Plan    Cancer Staging  Breast cancer of upper-outer quadrant of right female breast    Primary site: Breast (Right)    Staging method: AJCC 7th Edition    Clinical: Stage IIA (T2, N0, cM0) signed by Kristy Mina MD on 1/27/2016  3:26 PM    Pathologic free text: Invasive ductal carcioma with metaplastic features.    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    She had a lumpectomy and sentinel lymph node procedure done by Dr. Jerry on 1/18/16.    Final pathology showed that the cancer was invasive ductal carcinoma with metaplastic features, grade 3, triple negative, with a 3 mm focus involving the inferior margin.  Final stage was Stage IIA (T2, N0, cM0).    She started chemotherapy on 2/5/16 and completed 4 cycles of dose dense AC chemotherapy.  She had 12 weeks of Taxol chemotherapy and then underwent reresection on 7/6/16.  There was no residual cancer seen in pathology.  She completed adjuvant irradiation on 9/6/16.    1.  The breast cancer point of view, she appears to be doing quite well.  No suspicious findings in the physical examination.  She does have a bit of skin thickening left over from the radiation over her right breast which remains about the same as in the previous visit.  Surgical scar is healed well.  She really gives no symptoms suggestive of breast cancer recurrence.  She is  scheduled to have this years mammogram done on 11/18/2021.  I asked her to keep that appointment.    2.  We discussed about follow-up.  She is now 5 years out from completion of treatment.  At this time the chance of recurrence of the breast cancer is quite low.  I discussed with her that at this point she can change over to follow-up with primary care or with us once a year.  She was feeling a bit anxious about that.  Finally we decided that we will ask for a follow-up appointment in a year's time but in the interim if she starts feeling any trouble, she can certainly call and be seen earlier.    3.  She will continue to need a mammogram once a year.    4.  She had osteopenia in the last DEXA scan that was done on 1/3/2019.  Since it is more than 2 years out, I recommended repeating a DEXA scan.  She was agreeable.  She will have it scheduled sometime this year.  If that shows progression to osteoporosis, plan to refer her to osteoporosis care.  Meanwhile I reminded her to continue taking calcium and vitamin D tablets daily.    5.  She has already received the coronavirus vaccine.  Reminded her that she should have the flu vaccine when it becomes available in the coming months.    6.  Peripheral neuropathy: This is due to chemotherapy.  Unfortunately the last 2 or 3 doses of the Taxol chemotherapy caused sudden increase in the neuropathy.  Improvement after completion of chemotherapy was only partial.  She is left with a mild right foot drop.  Symptoms remain stable.  Unfortunately after so many years from chemotherapy, it is unlikely to improve much more.  She is quite aware of that.  Encouraged her to remain physically active.    7.  Follow-up: Return to clinic with MD or NP in 1 year.    Time spend >30 minutes total time for the patient.           Encounter Diagnoses:    Problem List Items Addressed This Visit        Nervous and Auditory    Peripheral neuropathy due to chemotherapy (H)       Musculoskeletal and  Integumentary    Osteopenia    Relevant Orders    DX Hip/Pelvis/Spine       Other    Malignant neoplasm of upper-outer quadrant of right breast in female, estrogen receptor negative (H) - Primary      Other Visit Diagnoses     Post-menopausal        Relevant Orders    DX Hip/Pelvis/Spine             CC: Christina Moore MD   ______________________________________________________________________________    History of Present Illness    Ms. Alissa Jansen is here for follow-up alone.    Overall she feels that she is doing okay.  Her only complaint again is that the peripheral neuropathy symptoms in her feet remain about the same.  However there has not been any worsening.  She still has some difficulty walking around because she still has a mild residual foot drop.  However she remains active.  She does not feel that she has slowed down at all.  In fact she feels that maybe she is walking a little bit better.  She feels that her right foot is turning in and so she is going to see a podiatrist next week.    She would like me to check out the right breast again.  She wants to be sure that everything feels the same.  Still feels a scarring in the right breast after the surgery and radiation.    Review of systems.  No fever or night sweats.  No loss of weight.  No lumps or bumps anywhere.  No unusual headaches or eyesight issues.  No dizziness.  No bleeding from the nose.  No sores in the mouth. No problems with swallowing.  No chest pain. No shortness of breath. No cough.  No abdominal pain. No nausea or vomiting.  No diarrhea or constipation.  No blood in stool or black colored stools.  No problems passing urine.  No vaginal discharge.  No numbness or tingling in hands or feet.  No skin rashes.  A 14 point review of systems is otherwise negative.        Past History    Past Medical History:   Diagnosis Date     Closed fracture of lateral malleolus of right ankle 09/02/2004    fell in a hole golfing.     Colon  "polyps 09/09/2008    2 polyps found on colonoscopy.     Gastric erosions 01/28/2020    Reactive gastropathy.     H/O colonoscopy 01/01/2014    repeat 5 years     Hiatal hernia 01/28/2020     Hx antineoplastic chemotherapy 01/01/2016    Right Breast     Hx of radiation therapy 06/01/2016    Right Breast     Hyperlipidemia      Malignant neoplasm of upper-outer quadrant of right breast in female, estrogen receptor negative (H) 12/22/2015     Menopause     1996     Osteoarthritis        Past Surgical History:   Procedure Laterality Date     BIOPSY BREAST Right 2015    dx breast cancer     COLONOSCOPY W/ POLYPECTOMY  05/16/2019     DENTAL SURGERY      2010     ESOPHAGOSCOPY, GASTROSCOPY, DUODENOSCOPY (EGD), COMBINED  01/28/2020    Large hiatal hernia.  Few stomach erosions.  Biopsy showed reactive gastropathy.  H. pylori negative.     KNEE SURGERY Left 1975    For \"torn ligaments\"     LUMPECTOMY BREAST Right 01/18/2016    Dr. Jerry.     LUMPECTOMY BREAST Right 07/06/2016    Re-excision lumpectomy and port removal, Dr. Jerry     PHACOEMULSIFICATION CLEAR CORNEA WITH STANDARD INTRAOCULAR LENS IMPLANT Bilateral Left 11/19/13, Right 12/3/13     SENTINEL LYMPH NODE BIOPSY Right 01/18/2016    Dr. Jerry.         Physical Exam    BP (!) 161/103 (BP Location: Right arm, Cuff Size: Adult Regular)   Pulse 91   Temp 98.9  F (37.2  C) (Oral)   Resp 16   Ht 1.651 m (5' 5\")   Wt 82.2 kg (181 lb 4.8 oz)   SpO2 100%   BMI 30.17 kg/m        GENERAL: Alert and oriented to time place and person. Seated comfortably. In no distress.  She looks well overall.  Normal build.  Very pleasant.    She still has a tendency to step height because of her mild foot drop especially on the right side.  Overall gait appears to be fairly steady now.    HEAD: Atraumatic and normocephalic.    EYES: BRITNI, EOMI.  No pallor.  No icterus.    Oral cavity: no mucosal lesion or tonsillar enlargement.    NECK: supple. JVP normal.  No thyroid " enlargement.    LYMPH NODES: No palpable, cervical, axillary or inguinal lymphadenopathy.    CHEST: clear to auscultation bilaterally.  Resonant to percussion throughout bilaterally.  Symmetrical breath movements bilaterally.    CVS: S1 and S2 are heard. Regular rate and rhythm.  No murmur or gallop or rub heard.  No peripheral edema.    ABDOMEN: Soft. Not tender. Not distended.  No palpable hepatomegaly or splenomegaly.  No other mass palpable.  Bowel sounds heard.    EXTREMITIES: Warm.    SKIN: no rash, or bruising or purpura.  Has a full head of hair.    BREASTS:  Right breast: Normal in contour.  Surgical scar has healed well.  Nipple looks normal.  Skin has a little bit of thickening left over after the radiation.  No palpable mass or tenderness in the right breast.  The right axilla is without mass or nodule.    Left breast: Contour looks normal.  No palpable mass.  No tenderness.  Nipple looks normal.  Skin looks normal.  Left axilla is without mass or nodule.    Lab Results    No results found for this or any previous visit (from the past 168 hour(s)).    Imaging    No results found.      Signed by: Kristy Mina MD

## 2021-08-27 ENCOUNTER — ANCILLARY PROCEDURE (OUTPATIENT)
Dept: BONE DENSITY | Facility: CLINIC | Age: 76
End: 2021-08-27
Attending: INTERNAL MEDICINE
Payer: COMMERCIAL

## 2021-08-27 DIAGNOSIS — M94.9 DISORDER OF BONE AND CARTILAGE: ICD-10-CM

## 2021-08-27 DIAGNOSIS — Z78.0 POST-MENOPAUSAL: ICD-10-CM

## 2021-08-27 DIAGNOSIS — M89.9 DISORDER OF BONE AND CARTILAGE: ICD-10-CM

## 2021-08-27 PROCEDURE — 77080 DXA BONE DENSITY AXIAL: CPT | Mod: TC | Performed by: RADIOLOGY

## 2021-11-18 ENCOUNTER — HOSPITAL ENCOUNTER (OUTPATIENT)
Dept: MAMMOGRAPHY | Facility: CLINIC | Age: 76
Discharge: HOME | End: 2021-11-18
Attending: INTERNAL MEDICINE | Admitting: INTERNAL MEDICINE
Payer: COMMERCIAL

## 2021-11-18 DIAGNOSIS — Z17.1 MALIGNANT NEOPLASM OF UPPER-OUTER QUADRANT OF RIGHT BREAST IN FEMALE, ESTROGEN RECEPTOR NEGATIVE (H): ICD-10-CM

## 2021-11-18 DIAGNOSIS — C50.411 MALIGNANT NEOPLASM OF UPPER-OUTER QUADRANT OF RIGHT BREAST IN FEMALE, ESTROGEN RECEPTOR NEGATIVE (H): ICD-10-CM

## 2021-11-18 DIAGNOSIS — Z12.31 ENCOUNTER FOR SCREENING MAMMOGRAM FOR MALIGNANT NEOPLASM OF BREAST: ICD-10-CM

## 2021-11-18 PROCEDURE — 77067 SCR MAMMO BI INCL CAD: CPT

## 2022-02-10 ENCOUNTER — OFFICE VISIT (OUTPATIENT)
Dept: FAMILY MEDICINE | Facility: CLINIC | Age: 77
End: 2022-02-10
Payer: COMMERCIAL

## 2022-02-10 VITALS
OXYGEN SATURATION: 99 % | BODY MASS INDEX: 29.99 KG/M2 | RESPIRATION RATE: 14 BRPM | TEMPERATURE: 98.6 F | HEART RATE: 82 BPM | WEIGHT: 180 LBS | DIASTOLIC BLOOD PRESSURE: 84 MMHG | HEIGHT: 65 IN | SYSTOLIC BLOOD PRESSURE: 138 MMHG

## 2022-02-10 DIAGNOSIS — G62.0 PERIPHERAL NEUROPATHY DUE TO CHEMOTHERAPY (H): ICD-10-CM

## 2022-02-10 DIAGNOSIS — T45.1X5A PERIPHERAL NEUROPATHY DUE TO CHEMOTHERAPY (H): ICD-10-CM

## 2022-02-10 DIAGNOSIS — Z17.1 MALIGNANT NEOPLASM OF UPPER-OUTER QUADRANT OF RIGHT BREAST IN FEMALE, ESTROGEN RECEPTOR NEGATIVE (H): ICD-10-CM

## 2022-02-10 DIAGNOSIS — L98.9 SKIN LESION: ICD-10-CM

## 2022-02-10 DIAGNOSIS — Z00.00 ENCOUNTER FOR MEDICARE ANNUAL WELLNESS EXAM: Primary | ICD-10-CM

## 2022-02-10 DIAGNOSIS — C50.411 MALIGNANT NEOPLASM OF UPPER-OUTER QUADRANT OF RIGHT BREAST IN FEMALE, ESTROGEN RECEPTOR NEGATIVE (H): ICD-10-CM

## 2022-02-10 DIAGNOSIS — E78.2 MIXED HYPERLIPIDEMIA: ICD-10-CM

## 2022-02-10 LAB
ALBUMIN SERPL-MCNC: 4 G/DL (ref 3.5–5)
ALP SERPL-CCNC: 70 U/L (ref 45–120)
ALT SERPL W P-5'-P-CCNC: 23 U/L (ref 0–45)
ANION GAP SERPL CALCULATED.3IONS-SCNC: 10 MMOL/L (ref 5–18)
AST SERPL W P-5'-P-CCNC: 25 U/L (ref 0–40)
BILIRUB SERPL-MCNC: 0.7 MG/DL (ref 0–1)
BUN SERPL-MCNC: 11 MG/DL (ref 8–28)
CALCIUM SERPL-MCNC: 9.5 MG/DL (ref 8.5–10.5)
CHLORIDE BLD-SCNC: 105 MMOL/L (ref 98–107)
CHOLEST SERPL-MCNC: 163 MG/DL
CO2 SERPL-SCNC: 26 MMOL/L (ref 22–31)
CREAT SERPL-MCNC: 0.79 MG/DL (ref 0.6–1.1)
ERYTHROCYTE [DISTWIDTH] IN BLOOD BY AUTOMATED COUNT: 12.6 % (ref 10–15)
FASTING STATUS PATIENT QL REPORTED: YES
GFR SERPL CREATININE-BSD FRML MDRD: 77 ML/MIN/1.73M2
GLUCOSE BLD-MCNC: 97 MG/DL (ref 70–125)
HCT VFR BLD AUTO: 39.7 % (ref 35–47)
HDLC SERPL-MCNC: 55 MG/DL
HGB BLD-MCNC: 13.1 G/DL (ref 11.7–15.7)
LDLC SERPL CALC-MCNC: 95 MG/DL
MCH RBC QN AUTO: 33.9 PG (ref 26.5–33)
MCHC RBC AUTO-ENTMCNC: 33 G/DL (ref 31.5–36.5)
MCV RBC AUTO: 103 FL (ref 78–100)
PLATELET # BLD AUTO: 212 10E3/UL (ref 150–450)
POTASSIUM BLD-SCNC: 4.3 MMOL/L (ref 3.5–5)
PROT SERPL-MCNC: 6.4 G/DL (ref 6–8)
RBC # BLD AUTO: 3.87 10E6/UL (ref 3.8–5.2)
SODIUM SERPL-SCNC: 141 MMOL/L (ref 136–145)
TRIGL SERPL-MCNC: 67 MG/DL
WBC # BLD AUTO: 3.9 10E3/UL (ref 4–11)

## 2022-02-10 PROCEDURE — 80061 LIPID PANEL: CPT | Performed by: FAMILY MEDICINE

## 2022-02-10 PROCEDURE — 99397 PER PM REEVAL EST PAT 65+ YR: CPT | Mod: 25 | Performed by: FAMILY MEDICINE

## 2022-02-10 PROCEDURE — 99214 OFFICE O/P EST MOD 30 MIN: CPT | Mod: 25 | Performed by: FAMILY MEDICINE

## 2022-02-10 PROCEDURE — 80053 COMPREHEN METABOLIC PANEL: CPT | Performed by: FAMILY MEDICINE

## 2022-02-10 PROCEDURE — 36415 COLL VENOUS BLD VENIPUNCTURE: CPT | Performed by: FAMILY MEDICINE

## 2022-02-10 PROCEDURE — 90715 TDAP VACCINE 7 YRS/> IM: CPT | Performed by: FAMILY MEDICINE

## 2022-02-10 PROCEDURE — 90471 IMMUNIZATION ADMIN: CPT | Performed by: FAMILY MEDICINE

## 2022-02-10 PROCEDURE — 85027 COMPLETE CBC AUTOMATED: CPT | Performed by: FAMILY MEDICINE

## 2022-02-10 PROCEDURE — 86803 HEPATITIS C AB TEST: CPT | Performed by: FAMILY MEDICINE

## 2022-02-10 RX ORDER — SIMVASTATIN 20 MG
20 TABLET ORAL AT BEDTIME
Qty: 90 TABLET | Refills: 3 | Status: SHIPPED | OUTPATIENT
Start: 2022-02-10 | End: 2023-05-03

## 2022-02-10 ASSESSMENT — MIFFLIN-ST. JEOR: SCORE: 1302.35

## 2022-02-10 ASSESSMENT — ACTIVITIES OF DAILY LIVING (ADL): CURRENT_FUNCTION: NO ASSISTANCE NEEDED

## 2022-02-10 NOTE — PROGRESS NOTES
SUBJECTIVE:   Alissa Jansen is a 76 year old female who presents for Preventive Visit.    Patient has been advised of split billing requirements and indicates understanding: Yes  Are you in the first 12 months of your Medicare coverage?  No    HPI     1) skin lesion on right eyebrow for years and slowly grow with mild itching.   2) hypercholesteremia: she takes medication for years and no side effect  3) perphrila neuropathy: hand and foot numbness for years and stable. She does not want to take any medication   4) right breast ca 2016, s/p lumpectomy, chemotherapy and radiation therapy. She follow-up with oncologist annually and has been in remission      Do you feel safe in your environment? Yes    Have you ever done Advance Care Planning? (For example, a Health Directive, POLST, or a discussion with a medical provider or your loved ones about your wishes): No, advance care planning information given to patient to review.  Patient plans to discuss their wishes with loved ones or provider.         Fall risk     click delete button to remove this line now  Cognitive Screening   1) Repeat 3 items (Leader, Season, Table)   2) Clock draw: NORMAL  3) 3 item recall: Recalls 3 objects  Results: 3 items recalled: COGNITIVE IMPAIRMENT LESS LIKELY    Mini-CogTM Copyright S Harjit. Licensed by the author for use in [facility]; reprinted with permission (lidia@.[facility]). All rights reserved.      Do you have sleep apnea, excessive snoring or daytime drowsiness?: no    Reviewed and updated as needed this visit by clinical staff  Tobacco  Allergies  Meds             Reviewed and updated as needed this visit by Provider               Social History     Tobacco Use     Smoking status: Never Smoker     Smokeless tobacco: Never Used     Tobacco comment: No exposure   Substance Use Topics     Alcohol use: Yes     Alcohol/week: 1.7 standard drinks     If you drink alcohol do you typically have >3 drinks per day or  ">7 drinks per week? No    Alcohol Use 2/10/2022   Prescreen: >3 drinks/day or >7 drinks/week? No                Current providers sharing in care for this patient include:   Patient Care Team:  Christina Moore MD as PCP - General  Kristy Mina MD as MD (Hematology & Oncology)  Christina Moore MD as Assigned PCP  Kristy Mina MD as Assigned Cancer Care Provider    The following health maintenance items are reviewed in Epic and correct as of today:  Health Maintenance Due   Topic Date Due     ANNUAL REVIEW OF HM ORDERS  Never done     HEPATITIS C SCREENING  Never done     DTAP/TDAP/TD IMMUNIZATION (1 - Tdap) 04/15/2011     ZOSTER IMMUNIZATION (2 of 3) 08/05/2011     MEDICARE ANNUAL WELLNESS VISIT  02/07/2021     FALL RISK ASSESSMENT  02/07/2021     Lab work is in process  Labs reviewed in [facility]      pt fu with oncologist for annual mammogram  Pertinent mammograms are reviewed under the imaging tab.    Review of Systems  Constitutional, HEENT, cardiovascular, pulmonary, GI, , musculoskeletal, neuro, skin, endocrine and psych systems are negative, except as otherwise noted.    OBJECTIVE:   /84 (BP Location: Left arm, Patient Position: Sitting, Cuff Size: Adult Regular)   Pulse 82   Temp 98.6  F (37  C) (Oral)   Resp 14   Ht 1.651 m (5' 5\")   Wt 81.6 kg (180 lb)   LMP  (LMP Unknown)   SpO2 99%   Breastfeeding No   BMI 29.95 kg/m   Estimated body mass index is 29.95 kg/m  as calculated from the following:    Height as of this encounter: 1.651 m (5' 5\").    Weight as of this encounter: 81.6 kg (180 lb).  Physical Exam  GENERAL: healthy, alert and no distress  EYES: Eyes grossly normal to inspection, PERRL and conjunctivae and sclerae normal  HENT: ear canals and TM's normal, nose and mouth without ulcers or lesions  NECK: no adenopathy, no asymmetry, masses, or scars and thyroid normal to palpation  RESP: lungs clear to auscultation - no rales, rhonchi or wheezes  BREAST: normal without masses, " tenderness or nipple discharge and no palpable axillary masses or adenopathy  CV: regular rate and rhythm, normal S1 S2, no S3 or S4, no murmur, click or rub, no peripheral edema and peripheral pulses strong  ABDOMEN: soft, nontender, no hepatosplenomegaly, no masses and bowel sounds normal  MS: no gross musculoskeletal defects noted, no edema  SKIN: no suspicious lesions or rashes. A skin lesion on right eyebrow  NEURO: Normal strength and tone, mentation intact and speech normal  PSYCH: mentation appears normal, affect normal/bright    Diagnostic Test Results:  Labs reviewed in Epic    ASSESSMENT / PLAN:   (Z00.00) Encounter for Medicare annual wellness exam  (primary encounter diagnosis)  Comment: encourage annual wellness  Plan: Hepatitis C antibody, CBC with platelets            (L98.9) Skin lesion  Comment: a skin lesion on right eyebrow for years and slowly grow and mild itching, likely seborrheic keratosis vs actinic kertosis  Plan: Adult Dermatology Referral       Will have consult with dermatologist and likely biopsy.     (E78.2) Mixed hyperlipidemia  Comment: stable for years and takes medication as instructed  Plan: simvastatin (ZOCOR) 20 MG tablet, Comprehensive        metabolic panel (BMP + Alb, Alk Phos, ALT, AST,        Total. Bili, TP), Lipid panel       Continue current plan. 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 Stable. Pt does not want medication   Plan: advise massage the hands and feet and keep warm.     (C50.411,  Z17.1) Malignant neoplasm of upper-outer quadrant of right breast in female, estrogen receptor negative (H)  Comment: s/p lumpectomy chemotherapy and radiation therapy. She is in remission  Plan: continue follow-up with oncologist.     Patient has been advised of split billing requirements and indicates understanding: Yes    COUNSELING:  Reviewed preventive health counseling, as reflected in patient  "instructions       Regular exercise       Healthy diet/nutrition       Hearing screening       Fall risk prevention       Osteoporosis prevention/bone health       Advanced Planning     Estimated body mass index is 29.95 kg/m  as calculated from the following:    Height as of this encounter: 1.651 m (5' 5\").    Weight as of this encounter: 81.6 kg (180 lb).    Weight management plan: Discussed healthy diet and exercise guidelines    She reports that she has never smoked. She has never used smokeless tobacco.      Appropriate preventive services were discussed with this patient, including applicable screening as appropriate for cardiovascular disease, diabetes, osteopenia/osteoporosis, and glaucoma.  As appropriate for age/gender, discussed screening for colorectal cancer, prostate cancer, breast cancer, and cervical cancer. Checklist reviewing preventive services available has been given to the patient.    Reviewed patients plan of care and provided an AVS. The Basic Care Plan (routine screening as documented in Health Maintenance) for Alissa meets the Care Plan requirement. This Care Plan has been established and reviewed with the Patient.    Counseling Resources:  ATP IV Guidelines  Pooled Cohorts Equation Calculator  Breast Cancer Risk Calculator  Breast Cancer: Medication to Reduce Risk  FRAX Risk Assessment  ICSI Preventive Guidelines  Dietary Guidelines for Americans, 2010  USDA's MyPlate  ASA Prophylaxis  Lung CA Screening    Lou Shelton MD  [facility]    Identified Health Risks:  "

## 2022-02-10 NOTE — PATIENT INSTRUCTIONS
Patient Education   Personalized Prevention Plan  You are due for the preventive services outlined below.  Your care team is available to assist you in scheduling these services.  If you have already completed any of these items, please share that information with your care team to update in your medical record.  Health Maintenance Due   Topic Date Due     ANNUAL REVIEW OF HM ORDERS  Never done     Hepatitis C Screening  Never done     FALL RISK ASSESSMENT  02/07/2021

## 2022-02-11 LAB — HCV AB SERPL QL IA: NEGATIVE

## 2022-03-14 ENCOUNTER — TRANSFERRED RECORDS (OUTPATIENT)
Dept: HEALTH INFORMATION MANAGEMENT | Facility: CLINIC | Age: 77
End: 2022-03-14
Payer: COMMERCIAL

## 2022-08-10 ENCOUNTER — TRANSFERRED RECORDS (OUTPATIENT)
Dept: HEALTH INFORMATION MANAGEMENT | Facility: CLINIC | Age: 77
End: 2022-08-10

## 2022-08-10 ENCOUNTER — HOSPITAL ENCOUNTER (OUTPATIENT)
Facility: CLINIC | Age: 77
Setting detail: OBSERVATION
Discharge: HOME | End: 2022-08-11
Attending: EMERGENCY MEDICINE | Admitting: INTERNAL MEDICINE
Payer: COMMERCIAL

## 2022-08-10 ENCOUNTER — APPOINTMENT (OUTPATIENT)
Dept: RADIOLOGY | Facility: CLINIC | Age: 77
End: 2022-08-10
Attending: EMERGENCY MEDICINE
Payer: COMMERCIAL

## 2022-08-10 DIAGNOSIS — J69.0 ASPIRATION PNEUMONITIS (H): ICD-10-CM

## 2022-08-10 DIAGNOSIS — R09.02 HYPOXIA: ICD-10-CM

## 2022-08-10 LAB
ALBUMIN SERPL-MCNC: 4 G/DL (ref 3.5–5)
ALP SERPL-CCNC: 83 U/L (ref 45–120)
ALT SERPL W P-5'-P-CCNC: 22 U/L (ref 0–45)
ANION GAP SERPL CALCULATED.3IONS-SCNC: 11 MMOL/L (ref 5–18)
AST SERPL W P-5'-P-CCNC: 26 U/L (ref 0–40)
ATRIAL RATE - MUSE: 86 BPM
BILIRUB DIRECT SERPL-MCNC: 0.2 MG/DL
BILIRUB SERPL-MCNC: 0.6 MG/DL (ref 0–1)
BUN SERPL-MCNC: 8 MG/DL (ref 8–28)
CALCIUM SERPL-MCNC: 9.4 MG/DL (ref 8.5–10.5)
CHLORIDE BLD-SCNC: 104 MMOL/L (ref 98–107)
CO2 SERPL-SCNC: 21 MMOL/L (ref 22–31)
CREAT SERPL-MCNC: 0.8 MG/DL (ref 0.6–1.1)
DIASTOLIC BLOOD PRESSURE - MUSE: 75 MMHG
ERYTHROCYTE [DISTWIDTH] IN BLOOD BY AUTOMATED COUNT: 12.8 % (ref 10–15)
GFR SERPL CREATININE-BSD FRML MDRD: 75 ML/MIN/1.73M2
GLUCOSE BLD-MCNC: 135 MG/DL (ref 70–125)
HCT VFR BLD AUTO: 41 % (ref 35–47)
HGB BLD-MCNC: 13.6 G/DL (ref 11.7–15.7)
INTERPRETATION ECG - MUSE: NORMAL
LACTATE SERPL-SCNC: 1.1 MMOL/L (ref 0.7–2)
MCH RBC QN AUTO: 32.2 PG (ref 26.5–33)
MCHC RBC AUTO-ENTMCNC: 33.2 G/DL (ref 31.5–36.5)
MCV RBC AUTO: 97 FL (ref 78–100)
P AXIS - MUSE: 0 DEGREES
PLATELET # BLD AUTO: 197 10E3/UL (ref 150–450)
POTASSIUM BLD-SCNC: 3.7 MMOL/L (ref 3.5–5)
PR INTERVAL - MUSE: 132 MS
PROT SERPL-MCNC: 7.1 G/DL (ref 6–8)
QRS DURATION - MUSE: 72 MS
QT - MUSE: 380 MS
QTC - MUSE: 454 MS
R AXIS - MUSE: 33 DEGREES
RBC # BLD AUTO: 4.22 10E6/UL (ref 3.8–5.2)
SARS-COV-2 RNA RESP QL NAA+PROBE: NEGATIVE
SODIUM SERPL-SCNC: 136 MMOL/L (ref 136–145)
SYSTOLIC BLOOD PRESSURE - MUSE: 111 MMHG
T AXIS - MUSE: -3 DEGREES
VENTRICULAR RATE- MUSE: 86 BPM
WBC # BLD AUTO: 18.3 10E3/UL (ref 4–11)

## 2022-08-10 PROCEDURE — 80053 COMPREHEN METABOLIC PANEL: CPT | Performed by: INTERNAL MEDICINE

## 2022-08-10 PROCEDURE — 83605 ASSAY OF LACTIC ACID: CPT | Performed by: INTERNAL MEDICINE

## 2022-08-10 PROCEDURE — 36415 COLL VENOUS BLD VENIPUNCTURE: CPT | Performed by: INTERNAL MEDICINE

## 2022-08-10 PROCEDURE — 250N000011 HC RX IP 250 OP 636: Performed by: EMERGENCY MEDICINE

## 2022-08-10 PROCEDURE — 250N000011 HC RX IP 250 OP 636: Performed by: INTERNAL MEDICINE

## 2022-08-10 PROCEDURE — G0378 HOSPITAL OBSERVATION PER HR: HCPCS

## 2022-08-10 PROCEDURE — 85014 HEMATOCRIT: CPT | Performed by: INTERNAL MEDICINE

## 2022-08-10 PROCEDURE — 71045 X-RAY EXAM CHEST 1 VIEW: CPT

## 2022-08-10 PROCEDURE — 82248 BILIRUBIN DIRECT: CPT | Performed by: INTERNAL MEDICINE

## 2022-08-10 PROCEDURE — C9803 HOPD COVID-19 SPEC COLLECT: HCPCS

## 2022-08-10 PROCEDURE — 93005 ELECTROCARDIOGRAM TRACING: CPT | Performed by: EMERGENCY MEDICINE

## 2022-08-10 PROCEDURE — 96374 THER/PROPH/DIAG INJ IV PUSH: CPT

## 2022-08-10 PROCEDURE — 96375 TX/PRO/DX INJ NEW DRUG ADDON: CPT

## 2022-08-10 PROCEDURE — 99285 EMERGENCY DEPT VISIT HI MDM: CPT | Mod: 25

## 2022-08-10 PROCEDURE — 99220 PR INITIAL OBSERVATION CARE,LEVEL III: CPT | Performed by: INTERNAL MEDICINE

## 2022-08-10 PROCEDURE — U0005 INFEC AGEN DETEC AMPLI PROBE: HCPCS | Performed by: EMERGENCY MEDICINE

## 2022-08-10 PROCEDURE — 258N000003 HC RX IP 258 OP 636: Performed by: INTERNAL MEDICINE

## 2022-08-10 RX ORDER — AMOXICILLIN 250 MG
2 CAPSULE ORAL 2 TIMES DAILY PRN
Status: DISCONTINUED | OUTPATIENT
Start: 2022-08-10 | End: 2022-08-11 | Stop reason: HOSPADM

## 2022-08-10 RX ORDER — ONDANSETRON 2 MG/ML
4 INJECTION INTRAMUSCULAR; INTRAVENOUS EVERY 6 HOURS PRN
Status: DISCONTINUED | OUTPATIENT
Start: 2022-08-10 | End: 2022-08-11 | Stop reason: HOSPADM

## 2022-08-10 RX ORDER — METOCLOPRAMIDE HYDROCHLORIDE 5 MG/ML
10 INJECTION INTRAMUSCULAR; INTRAVENOUS ONCE
Status: COMPLETED | OUTPATIENT
Start: 2022-08-10 | End: 2022-08-10

## 2022-08-10 RX ORDER — AZITHROMYCIN 500 MG/5ML
500 INJECTION, POWDER, LYOPHILIZED, FOR SOLUTION INTRAVENOUS EVERY 24 HOURS
Status: COMPLETED | OUTPATIENT
Start: 2022-08-10 | End: 2022-08-10

## 2022-08-10 RX ORDER — ONDANSETRON 4 MG/1
4 TABLET, ORALLY DISINTEGRATING ORAL EVERY 6 HOURS PRN
Status: DISCONTINUED | OUTPATIENT
Start: 2022-08-10 | End: 2022-08-11 | Stop reason: HOSPADM

## 2022-08-10 RX ORDER — ACETAMINOPHEN 325 MG/1
650 TABLET ORAL EVERY 6 HOURS PRN
Status: DISCONTINUED | OUTPATIENT
Start: 2022-08-10 | End: 2022-08-11 | Stop reason: HOSPADM

## 2022-08-10 RX ORDER — ACETAMINOPHEN 650 MG/1
650 SUPPOSITORY RECTAL EVERY 6 HOURS PRN
Status: DISCONTINUED | OUTPATIENT
Start: 2022-08-10 | End: 2022-08-11 | Stop reason: HOSPADM

## 2022-08-10 RX ORDER — KETOROLAC TROMETHAMINE 15 MG/ML
15 INJECTION, SOLUTION INTRAMUSCULAR; INTRAVENOUS ONCE
Status: COMPLETED | OUTPATIENT
Start: 2022-08-10 | End: 2022-08-10

## 2022-08-10 RX ORDER — SIMVASTATIN 20 MG
20 TABLET ORAL AT BEDTIME
Status: DISCONTINUED | OUTPATIENT
Start: 2022-08-10 | End: 2022-08-11 | Stop reason: HOSPADM

## 2022-08-10 RX ORDER — AMOXICILLIN 250 MG
1 CAPSULE ORAL 2 TIMES DAILY PRN
Status: DISCONTINUED | OUTPATIENT
Start: 2022-08-10 | End: 2022-08-11 | Stop reason: HOSPADM

## 2022-08-10 RX ORDER — CEFTRIAXONE 2 G/1
2 INJECTION, POWDER, FOR SOLUTION INTRAMUSCULAR; INTRAVENOUS EVERY 24 HOURS
Status: DISCONTINUED | OUTPATIENT
Start: 2022-08-10 | End: 2022-08-11 | Stop reason: HOSPADM

## 2022-08-10 RX ORDER — POLYETHYLENE GLYCOL 3350 17 G/17G
17 POWDER, FOR SOLUTION ORAL DAILY PRN
Status: DISCONTINUED | OUTPATIENT
Start: 2022-08-10 | End: 2022-08-11 | Stop reason: HOSPADM

## 2022-08-10 RX ADMIN — AZITHROMYCIN MONOHYDRATE 500 MG: 500 INJECTION, POWDER, LYOPHILIZED, FOR SOLUTION INTRAVENOUS at 17:42

## 2022-08-10 RX ADMIN — CEFTRIAXONE SODIUM 2 G: 2 INJECTION, POWDER, FOR SOLUTION INTRAMUSCULAR; INTRAVENOUS at 16:51

## 2022-08-10 RX ADMIN — KETOROLAC TROMETHAMINE 15 MG: 15 INJECTION, SOLUTION INTRAMUSCULAR; INTRAVENOUS at 13:38

## 2022-08-10 RX ADMIN — METOCLOPRAMIDE HYDROCHLORIDE 10 MG: 5 INJECTION INTRAMUSCULAR; INTRAVENOUS at 13:07

## 2022-08-10 ASSESSMENT — ACTIVITIES OF DAILY LIVING (ADL)
ADLS_ACUITY_SCORE: 35
DEPENDENT_IADLS:: INDEPENDENT

## 2022-08-10 ASSESSMENT — ENCOUNTER SYMPTOMS: SHORTNESS OF BREATH: 1

## 2022-08-10 NOTE — ED NOTES
Expected Patient Referral to ED  12:21 PM    Referring Clinic/Provider:  Outpatient anesthesia    Reason for referral/Clinical facts:  - getting outpatient colonoscopy today and had emesis during procedure; suctioned but still hypoxic; coarse lung sounds in bases  - suspects aspirated with resulting hypoxia  - sending to the ED for ongoing hypoxia    Recommendations provided:  Send to ED for further evaluation    Caller was informed that this institution does possess the capabilities and/or resources to provide for patient and should be transferred to our facility.    Discussed that if direct admit is sought and any hurdles are encountered, this ED would be happy to see the patient and evaluate.    Informed caller that recommendations provided are recommendations based only on the facts provided and that they responsible to accept or reject the advice, or to seek a formal in person consultation as needed and that this ED will see/treat patient should they arrive.      Benigno Cervantes MD  [facility] EMERGENCY ROOM  [address] [address]  [phone]     Benigno Cervantes MD  08/10/22 1223

## 2022-08-10 NOTE — ED TRIAGE NOTES
Patient arrived via EMS stretcher from [facility] was doing a coloscopy while still sedated had an emesis which they think she aspirated. Upon arrival she was on 10 liters re breather mask but now 94 % on room air.  Renny Ny RN  8/10/2022  12:42 PM         Triage Assessment     Row Name 08/10/22 1239       Triage Assessment (Adult)    Airway WDL X    Additional Documentation Breath Sounds (Group)       Respiratory WDL    Respiratory WDL X;cough    Cough Frequency infrequent    Cough Type congested;nonproductive       Breath Sounds    Breath Sounds All Fields    All Lung Fields Breath Sounds coarse       Skin Circulation/Temperature WDL    Skin Circulation/Temperature WDL WDL       Cardiac WDL    Cardiac WDL WDL       Peripheral/Neurovascular WDL    Peripheral Neurovascular WDL WDL       Cognitive/Neuro/Behavioral WDL    Cognitive/Neuro/Behavioral WDL WDL

## 2022-08-10 NOTE — ED PROVIDER NOTES
EMERGENCY DEPARTMENT ENCOUNTER      NAME: Alissa Jansen  AGE: 77 year old female  YOB: 1945  MRN: [id number]  EVALUATION DATE & TIME: 8/10/2022 12:33 PM    PCP: Christina Moore    ED PROVIDER: Benigno Cervantes M.D.      Chief Complaint   Patient presents with     Aspiration         IMPRESSION  1. Aspiration pneumonitis (H)    2. Hypoxia        PLAN  - admit to hospitalist for further care; med/surg obs    ED COURSE & MEDICAL DECISION MAKING    ED Course as of 08/10/22 1355   Wed Aug 10, 2022   1301 77yoF with no history of lung disease or smoking presenting per EMS from [facility] clinic for hypoxia & aspiration. Was having routine outpatient colonoscopy and vomited while sedated; aspirated during colonoscopy. Ongoing hypoxia afterward with no respiratory distress. Thus sent to the ED. Anaesthesiologist called & discussed with me prior to patient's arrival in the ED.    Ongoing O2 need here now of 3L NC with mild bibasilar crackles, normal work of breathing with normal phonation and no stridor, benign abdomen, no peripheral edema or calf tenderness. Triage EKG unremarkable with no ischemic changes; doubt ACS or primary cardiac etiology. Suspect simple aspiration; doubt pneumonia or need for antibiotics. Will require admission given ongoing hypoxia. Will obtain screening CXR while calling hospitalist. Patient comfortable with this plan; no further questions at this time.       --------------------------------------------------------------------------------   --------------------------------------------------------------------------------     12:49 I met with the patient for the initial interview and physical examination. Discussed plan for treatment and workup in the ED.  1:21 PM  I spoke with Dr. Clark, hospitalist who accepts patient for admission.  1:24 PM I updated patient      This patient involved a high degree of complexity in medical decision making, as significant risks were present  and assessed.    Broad differential considered for this patient presenting, including but not limited to:  Aspiration pneumonitis, pneumonia, CHF, pulmonary edema, pneumothorax, ACS, perforated viscus    I wore the following PPE during this patient encounter:  N95 mask, face shield w/ eye protection, gloves    MEDICATIONS GIVEN IN THE EMERGENCY DEPARTMENT  Medications   metoclopramide (REGLAN) injection 10 mg (10 mg Intravenous Given 8/10/22 1307)   ketorolac (TORADOL) injection 15 mg (15 mg Intravenous Given 8/10/22 1338)             =================================================================      HPI  Patient information was obtained from: patient    Use of : N/A       Alissa Jansen is a 77 year old female with a pertinent history of s/p colonoscopy, HLD, and hypoxia who presents to this ED via EMS for evaluation of aspiration.    Per EMS, patient underwent colonoscopy earlier today. While under anesthesia, patient had an episode of emesis and began to aspirate. She was then rushed to the ED. Upon arrival, patient was on 10 liters of re-breather mask but is currently 94% on 3L NC.    She denies any other complaints at this time. No history of smoking.     REVIEW OF SYSTEMS   Review of Systems   Respiratory: Positive for shortness of breath.         Aspirated   All other systems reviewed and are negative.    All other systems reviewed and are negative except as noted above in HPI.        --------------- MEDICAL HISTORY ---------------  PAST MEDICAL HISTORY:  Past Medical History:   Diagnosis Date     Closed fracture of lateral malleolus of right ankle 09/02/2004    fell in a hole golfing.     Colon polyps 09/09/2008    2 polyps found on colonoscopy.     Gastric erosions 01/28/2020    Reactive gastropathy.     H/O colonoscopy 01/01/2014    repeat 5 years     Hiatal hernia 01/28/2020     Hx antineoplastic chemotherapy 01/01/2016    Right Breast     Hx of radiation therapy 06/01/2016    Right  "Breast     Hyperlipidemia      Malignant neoplasm of upper-outer quadrant of right breast in female, estrogen receptor negative (H) 12/22/2015     Menopause     1996     Osteoarthritis      Osteopenia of multiple sites 09/2021    repeat bdt 2 yrs     Patient Active Problem List   Diagnosis     Benign Adenomatous Polyp Of The Large Intestine     Prediabetes     Osteopenia     Mixed hyperlipidemia     Malignant neoplasm of upper-outer quadrant of right breast in female, estrogen receptor negative (H)     Peripheral neuropathy due to chemotherapy (H)     Aspiration pneumonitis (H)     Hypoxia       PAST SURGICAL HISTORY:  Past Surgical History:   Procedure Laterality Date     BIOPSY BREAST Right 2015    dx breast cancer     COLONOSCOPY W/ POLYPECTOMY  05/16/2019     DENTAL SURGERY      2010     ESOPHAGOSCOPY, GASTROSCOPY, DUODENOSCOPY (EGD), COMBINED  01/28/2020    Large hiatal hernia.  Few stomach erosions.  Biopsy showed reactive gastropathy.  H. pylori negative.     KNEE SURGERY Left 1975    For \"torn ligaments\"     LUMPECTOMY BREAST Right 01/18/2016    Dr. Jerry.     LUMPECTOMY BREAST Right 07/06/2016    Re-excision lumpectomy and port removal, Dr. Jerry     PHACOEMULSIFICATION CLEAR CORNEA WITH STANDARD INTRAOCULAR LENS IMPLANT Bilateral Left 11/19/13, Right 12/3/13     SENTINEL LYMPH NODE BIOPSY Right 01/18/2016    Dr. Jerry.       CURRENT MEDICATIONS:    No current facility-administered medications for this encounter.    Current Outpatient Medications:      calcium carbonate-vitamin D3 (CALCIUM 600 + D,3,) 600 mg calcium- 200 unit cap, Take 1 capsule by mouth daily, Disp: , Rfl:      cholecalciferol, vitamin D3, 1,000 unit tablet, [CHOLECALCIFEROL, VITAMIN D3, 1,000 UNIT TABLET] Take 2,000 Units by mouth daily., Disp: , Rfl:      multivitamin with minerals (THERA-M) 9 mg iron-400 mcg Tab tablet, [MULTIVITAMIN WITH MINERALS (THERA-M) 9 MG IRON-400 MCG TAB TABLET] Take 1 tablet by mouth daily., Disp: , Rfl: 0     " "simvastatin (ZOCOR) 20 MG tablet, Take 1 tablet (20 mg) by mouth At Bedtime, Disp: 90 tablet, Rfl: 3    ALLERGIES:  No Known Allergies    FAMILY HISTORY:  Family History   Problem Relation Age of Onset     Breast Cancer Paternal Aunt         age in 70's     Heart Disease Father         \"Enlarged heart and liver\",  age 48, not much info.      Thyroid Disease Sister      No Known Problems Daughter        SOCIAL HISTORY:   Social History     Socioeconomic History     Marital status: Single     Number of children: 1   Tobacco Use     Smoking status: Never Smoker     Smokeless tobacco: Never Used     Tobacco comment: No exposure   Vaping Use     Vaping Use: Never used   Substance and Sexual Activity     Alcohol use: Yes     Alcohol/week: 1.7 standard drinks     Drug use: No     Sexual activity: Not Currently   Social History Narrative    Never .  One daughter, two grandchildren.  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       --------------- PHYSICAL EXAM ---------------  Nursing notes and vitals reviewed by me.  VITALS:  Vitals:    08/10/22 1253 08/10/22 1254 08/10/22 1255 08/10/22 1256   BP:       BP Location:       Patient Position:       Pulse: 83 82 83 84   Resp: 30 21 30 27   Temp:       TempSrc:       SpO2: 92% (!) 87% (!) 81% 95%   Weight:       Height:           PHYSICAL EXAM:    General:  alert, interactive, no distress  Eyes:  conjunctivae clear, conjugate gaze  HENT:  atraumatic, nose with no rhinorrhea, oropharynx clear  Neck:  no meningismus  Cardiovascular:  HR 80s during exam, regular rhythm, no murmurs, brisk cap refill  Chest:  no chest wall tenderness  Pulmonary: Mild bibasilar crackles. Satting 91% on 3L NC. No respiratory distress with normal work of breathing.  Abdomen:  soft, nondistended, nontender  :  no CVA tenderness  Back:  no midline spinal tenderness  Musculoskeletal:  no pretibial edema, no calf tenderness. Gross ROM intact to joints of extremities with no obvious " deformities.  Skin:  warm, dry, no rash  Neuro:  awake, alert, answers questions appropriately, follows commands, moves all limbs  Psych:  calm, normal affect      --------------- RESULTS ---------------  EKG:    Reviewed and interpreted by me.  - NSR at 86bpm, no ST or T wave changes, normal intervals  - no priors  My read.    LAB:  Reviewed and interpreted by me.  Results for orders placed or performed during the hospital encounter of 08/10/22   Asymptomatic COVID-19 Virus (Coronavirus) by PCR Nasopharyngeal    Specimen: Nasopharyngeal; Swab   Result Value Ref Range    SARS CoV2 PCR Negative Negative   ECG 12-LEAD WITH MUSE (LHE)   Result Value Ref Range    Systolic Blood Pressure 111 mmHg    Diastolic Blood Pressure 75 mmHg    Ventricular Rate 86 BPM    Atrial Rate 86 BPM    NC Interval 132 ms    QRS Duration 72 ms     ms    QTc 454 ms    P Axis 0 degrees    R AXIS 33 degrees    T Axis -3 degrees    Interpretation ECG       Sinus rhythm  Normal ECG  No previous ECGs available  Confirmed by SEE ED PROVIDER NOTE FOR, ECG INTERPRETATION ([id number]),  SAMY SHARP ([id number]) on 8/10/2022 1:54:30 PM         RADIOLOGY:  CXR 1-view:  pending at time of patient care handoff          I, Ena Laguerreong, am serving as a scribe to document services personally performed by Dr. Benigno Cervantes based on my observation and the provider's statements to me. I, Benigno Cervantes MD attest that Ena Us is acting in a scribe capacity, has observed my performance of the services and has documented them in accordance with my direction.      Benigno Cervantes MD  08/10/22  Emergency Medicine  [facility] EMERGENCY ROOM  [address] [id number] [phone]  Dept: [phone]       Benigno Cervantes MD  08/10/22 9539

## 2022-08-10 NOTE — CONSULTS
Care Management Initial Consult    General Information  Assessment completed with: Patient,    Type of CM/SW Visit: Initial Assessment  Primary Care Provider verified and updated as needed: Yes   Readmission within the last 30 days: no previous admission in last 30 days   Reason for Consult: discharge planning, health care directive, transportation  Advance Care Planning: Advance Care Planning Reviewed: no concerns identified        Communication Assessment  Patient's communication style: spoken language (English or Bilingual)      Cognitive  Cognitive/Neuro/Behavioral: WDL                      Living Environment:   People in home: alone     Current living Arrangements: other (see comments) ([facility])      Able to return to prior arrangements: yes     Family/Social Support:  Care provided by: self, friend (Friends assist if needed.)  Provides care for: no one  Marital Status: Single  Children, Other (specify) (Friends.)          Description of Support System: Involved, Supportive    Support Assessment: Adequate family and caregiver support, Adequate social supports    Current Resources:   Patient receiving home care services: No  Community Resources: None  Equipment currently used at home: grab bar, tub/shower, cane, straight  Supplies currently used at home: None    Employment/Financial:  Employment Status: retired     Financial Concerns: No concerns identified   Referral to Financial Worker: No     Lifestyle & Psychosocial Needs:  Social Determinants of Health     Tobacco Use: Low Risk      Smoking Tobacco Use: Never Smoker     Smokeless Tobacco Use: Never Used   Alcohol Use: Not on file   Financial Resource Strain: Not on file   Food Insecurity: Not on file   Transportation Needs: Not on file   Physical Activity: Not on file   Stress: Not on file   Social Connections: Not on file   Intimate Partner Violence: Not on file   Depression: Not at risk     PHQ-2 Score: 0   Housing Stability: Not on file     Functional  "Status:  Prior to admission patient needed assistance:   Dependent ADLs:: Independent, Ambulation-cane (Cane use PRN)  Dependent IADLs:: Independent  Assessment of Functional Status: Not at  functional baseline    Mental Health Status:  Mental Health Status: No Current Concerns       Chemical Dependency Status:  Chemical Dependency Status: No Current Concerns           Values/Beliefs:  Spiritual, Cultural Beliefs, [employer] Practices, Values that affect care: no (None stated to writer.)             Additional Information:  Writer met with pt to provide [facility] info, introduce Care Management service(CM), and obtain an initial assessment. Pt states residing in a [facility] and being totally I/ADL independent at baseline. Cane use intermittently. No in-home services or concerns expressed.    8/10 per GRISEL Argueta-\"77 year old old female with history of mixed hyperlipidemia, osteopenia, prediabetes, triple receptor negative right breast cancer, status post lumpectomy,, and chemotherapy and adjuvant radiation with no residual cancer on pathology, who presents after aspiration during outpatient medical procedure.  Prior to her procedure she was in her normal state of health.  Patient was undergoing colonoscopy at [facility] procedural facility when she had emesis and suspected aspiration.  She was transferred to [facility] for further evaluation due to resulting hypoxia.  She complains of productive cough with clear sputum.  No fever or chills. Patient's vital signs remarkable for oxygen desaturation measured at 81% off of supplemental oxygen.  She is currently on 4 L/min via nasal cannula.  Her saturations currently are 95%.  No laboratory was obtained at the time of my evaluation.\"    No pending consults. Anticipate improvement and return home via friends at time of discharge. CM to follow for changes in current anticipated discharge disposition.    Adiel Duffy RN        "

## 2022-08-10 NOTE — ED NOTES
After a few minutes off re breather mask oxygen saturations dropped to 87% on room air. Oxygen titrated to 3 liters via nasal canula now sats 92 % on 3 liters  Renny Ny RN  8/10/2022  12:55 PM

## 2022-08-10 NOTE — H&P
[facility] MEDICINE ADMISSION HISTORY AND PHYSICAL   PCP:Oscar Christina Debbie, [phone]    Assessment & Plan    Alissa Jansen is a 77 year old old female with history of mixed hyperlipidemia, osteopenia, prediabetes, triple receptor negative right breast cancer, status post treatment, who presents after aspiration during outpatient medical procedure.    Procedural aspiration of emesis  Acute respiratory failure with hypoxia.   Order CBC, basic blood profile, hepatic profile.  Order ceftriaxone 2 g IV every 24 hours, azithromycin 500 mg IV once then to 50 mg every 24 hours thereafter for 4 more doses.  Incentive spirometry ordered  Supplemental oxygen as needed to maintain oxygen saturation greater than 92%.  Sputum culture if able to obtain    Mixed hyperlipidemia  Osteopenia  Prediabetes  Order PTA medication: Simvastatin home dose    DVTP: Low Risk Patient   Code Status: No Order  Disposition: Observation admission    Chief Complaint  aspiration after outpatient medical procedure     History of Present Illness:     Alissa Jansen is a 77 year old old female with history of mixed hyperlipidemia, osteopenia, prediabetes, triple receptor negative right breast cancer, status post lumpectomy,, and chemotherapy and adjuvant radiation with no residual cancer on pathology, who presents after aspiration during outpatient medical procedure.  Prior to her procedure she was in her normal state of health.  Patient was undergoing colonoscopy at [facility] procedural facility when she had emesis and suspected aspiration.  She was transferred to [facility] for further evaluation due to resulting hypoxia.  She complains of productive cough with clear sputum.  No fever or chills.    Patient's vital signs remarkable for oxygen desaturation measured at 81% off of supplemental oxygen.  She is currently on 4 L/min via nasal cannula.  Her saturations currently are 95%.  No laboratory was  "obtained at the time of my evaluation.  Chest x-ray was pending.    Past Medical History     Past Medical History:  2004: Closed fracture of lateral malleolus of right ankle      Comment:  fell in a hole golfing.  2008: Colon polyps      Comment:  2 polyps found on colonoscopy.  2020: Gastric erosions      Comment:  Reactive gastropathy.  2014: H/O colonoscopy      Comment:  repeat 5 years  2020: Hiatal hernia  2016: Hx antineoplastic chemotherapy      Comment:  Right Breast  2016: Hx of radiation therapy      Comment:  Right Breast  No date: Hyperlipidemia  2015: Malignant neoplasm of upper-outer quadrant of right   breast in female, estrogen receptor negative (H)  No date: Menopause      Comment:    No date: Osteoarthritis  2021: Osteopenia of multiple sites      Comment:  repeat bdt 2 yrs     Surgical History     Past Surgical History:   Procedure Laterality Date     BIOPSY BREAST Right     dx breast cancer     COLONOSCOPY W/ POLYPECTOMY  2019     DENTAL SURGERY           ESOPHAGOSCOPY, GASTROSCOPY, DUODENOSCOPY (EGD), COMBINED  2020    Large hiatal hernia.  Few stomach erosions.  Biopsy showed reactive gastropathy.  H. pylori negative.     KNEE SURGERY Left     For \"torn ligaments\"     LUMPECTOMY BREAST Right 2016    Dr. Jerry.     LUMPECTOMY BREAST Right 2016    Re-excision lumpectomy and port removal, Dr. Jerry     PHACOEMULSIFICATION CLEAR CORNEA WITH STANDARD INTRAOCULAR LENS IMPLANT Bilateral Left 13, Right 12/3/13     SENTINEL LYMPH NODE BIOPSY Right 2016    Dr. Jerry.     Family History    Reviewed, and   Family History   Problem Relation Age of Onset     Breast Cancer Paternal Aunt         age in 70's     Heart Disease Father         \"Enlarged heart and liver\",  age 48, not much info.      Thyroid Disease Sister      No Known Problems Daughter       Patient's family history was reviewed and is " non-contributory to presenting complaint.     Social History      Social History     Tobacco Use     Smoking status: Never Smoker     Smokeless tobacco: Never Used     Tobacco comment: No exposure   Vaping Use     Vaping Use: Never used   Substance Use Topics     Alcohol use: Yes     Alcohol/week: 1.7 standard drinks     Drug use: No      Allergies   No Known Allergies  Prior to Admission Medications      Prior to Admission Medications   Prescriptions Last Dose Informant Patient Reported? Taking?   calcium carbonate-vitamin D3 (CALCIUM 600 + D,3,) 600 mg calcium- 200 unit cap 8/5/2022 at Unknown  Yes Yes   Sig: Take 1 capsule by mouth daily   cholecalciferol, vitamin D3, 1,000 unit tablet 8/5/2022 at Unknown  Yes Yes   Sig: [CHOLECALCIFEROL, VITAMIN D3, 1,000 UNIT TABLET] Take 2,000 Units by mouth daily.   multivitamin with minerals (THERA-M) 9 mg iron-400 mcg Tab tablet 8/5/2022 at Unknown  No Yes   Sig: [MULTIVITAMIN WITH MINERALS (THERA-M) 9 MG IRON-400 MCG TAB TABLET] Take 1 tablet by mouth daily.   simvastatin (ZOCOR) 20 MG tablet 8/5/2022 at Unknown  No Yes   Sig: Take 1 tablet (20 mg) by mouth At Bedtime      Facility-Administered Medications: None      Review of Systems   A 12 point comprehensive review of systems was negative except as noted above in HPI.    PHYSICAL EXAMINATION   Temp:  [97.9  F (36.6  C)] 97.9  F (36.6  C)  Pulse:  [80-92] 84  Resp:  [11-30] 27  BP: (111-126)/(61-75) 126/61  SpO2:  [81 %-95 %] 95 %  Weight:   Body mass index is 29.95 kg/m .    General Appearance:  Alert, cooperative, no distress, appears stated age   Head:  Normocephalic, without obvious abnormality, atraumatic   Eyes:  PERRL, conjunctiva/corneas clear, EOM's intact   Throat: Lips, mucosa, and tongue normal; teeth and gums normal   Neck: Supple, symmetrical, trachea midline, no adenopathy, thyroid: not enlarged, symmetric, no carotid bruit or JVD   Back:   Symmetric, no curvature, ROM normal, no CVA tenderness   Lungs:     Crackles at lung bases, respirations unlabored   Chest Wall:  No tenderness or deformity   Heart:  Regular rate and rhythm, S1, S2 normal,no murmur, rub or gallop   Abdomen:   Soft, non-tender, bowel sounds active all four quadrants,  no masses, no organomegaly   Extremities: Extremities normal, atraumatic, no cyanosis or edema   Skin: Skin color, texture, turgor normal, no rashes or ulcers noted over visible skin areas.    Neurologic: Alert and oriented X 3, Moves all 4 extremities     Results:  Recent Labs   Lab Test 02/10/22  0943      POTASSIUM 4.3   CHLORIDE 105   CO2 26   GLC 97   BUN 11   CR 0.79   GFRESTIMATED 77   TATI 9.5     Recent Labs   Lab Test 02/10/22  0943 02/16/21  0848 08/14/20  0745   CR 0.79 0.84 0.82     Recent Labs   Lab Test 12/16/15  0949   A1C 5.2     Recent Labs   Lab Test 02/10/22  0944   WBC 3.9*   HGB 13.1   HCT 39.7   *        Recent Labs   Lab Test 02/10/22  0944   HGB 13.1     EKG:  EKG: normal EKG, normal sinus rhythm.    Imaging:   Radiology Results: No results found for this or any previous visit (from the past 24 hour(s)).    Total time spent was estimated at 75 minutes.  Greater than 35 minutes was spent in direct evaluation of patient.  Rest of time was spent in coordination of care.     Dillon Clark DO, [facility] Service  Internal Medicine    8/10/2022  1:30 PM

## 2022-08-10 NOTE — PHARMACY-ADMISSION MEDICATION HISTORY
Pharmacy Note - Admission Medication History    Pertinent Provider Information: Patient has not had any medications since 8/5 in preperation for colonoscopy.      ______________________________________________________________________    Prior To Admission (PTA) med list completed and updated in EMR.       PTA Med List   Medication Sig Last Dose     calcium carbonate-vitamin D3 (CALCIUM 600 + D,3,) 600 mg calcium- 200 unit cap Take 1 capsule by mouth daily 8/5/2022 at Unknown     cholecalciferol, vitamin D3, 1,000 unit tablet [CHOLECALCIFEROL, VITAMIN D3, 1,000 UNIT TABLET] Take 2,000 Units by mouth daily. 8/5/2022 at Unknown     multivitamin with minerals (THERA-M) 9 mg iron-400 mcg Tab tablet [MULTIVITAMIN WITH MINERALS (THERA-M) 9 MG IRON-400 MCG TAB TABLET] Take 1 tablet by mouth daily. 8/5/2022 at Unknown     simvastatin (ZOCOR) 20 MG tablet Take 1 tablet (20 mg) by mouth At Bedtime 8/5/2022 at Unknown       Information source(s): Patient, Clinic records and [facility]  Method of interview communication: in-person    Summary of Changes to PTA Med List  New: None  Discontinued: None  Changed: None    Patient was asked about OTC/herbal products specifically.  PTA med list reflects this.    In the past week, patient estimated taking medication this percent of the time:  greater than 90%.    Allergies were reviewed, assessed, and updated with the patient.      Patient does not use any multi-dose medications prior to admission.    The information provided in this note is only as accurate as the sources available at the time of the update(s).    Thank you for the opportunity to participate in the care of this patient.    Jerod Delacruz  8/10/2022 1:16 PM

## 2022-08-11 VITALS
HEART RATE: 98 BPM | SYSTOLIC BLOOD PRESSURE: 152 MMHG | DIASTOLIC BLOOD PRESSURE: 78 MMHG | OXYGEN SATURATION: 96 % | BODY MASS INDEX: 30.1 KG/M2 | TEMPERATURE: 99 F | WEIGHT: 180.7 LBS | HEIGHT: 65 IN | RESPIRATION RATE: 20 BRPM

## 2022-08-11 LAB
ANION GAP SERPL CALCULATED.3IONS-SCNC: 10 MMOL/L (ref 5–18)
BACTERIA SPT CULT: NORMAL
BUN SERPL-MCNC: 8 MG/DL (ref 8–28)
CALCIUM SERPL-MCNC: 9.4 MG/DL (ref 8.5–10.5)
CHLORIDE BLD-SCNC: 100 MMOL/L (ref 98–107)
CO2 SERPL-SCNC: 24 MMOL/L (ref 22–31)
CREAT SERPL-MCNC: 0.79 MG/DL (ref 0.6–1.1)
ERYTHROCYTE [DISTWIDTH] IN BLOOD BY AUTOMATED COUNT: 12.9 % (ref 10–15)
GFR SERPL CREATININE-BSD FRML MDRD: 76 ML/MIN/1.73M2
GLUCOSE BLD-MCNC: 119 MG/DL (ref 70–125)
GRAM STAIN RESULT: NORMAL
HCT VFR BLD AUTO: 36.8 % (ref 35–47)
HGB BLD-MCNC: 12.6 G/DL (ref 11.7–15.7)
MCH RBC QN AUTO: 32.8 PG (ref 26.5–33)
MCHC RBC AUTO-ENTMCNC: 34.2 G/DL (ref 31.5–36.5)
MCV RBC AUTO: 96 FL (ref 78–100)
PLATELET # BLD AUTO: 218 10E3/UL (ref 150–450)
POTASSIUM BLD-SCNC: 3.6 MMOL/L (ref 3.5–5)
RBC # BLD AUTO: 3.84 10E6/UL (ref 3.8–5.2)
SODIUM SERPL-SCNC: 134 MMOL/L (ref 136–145)
WBC # BLD AUTO: 18.8 10E3/UL (ref 4–11)

## 2022-08-11 PROCEDURE — 99217 PR OBSERVATION CARE DISCHARGE: CPT | Performed by: INTERNAL MEDICINE

## 2022-08-11 PROCEDURE — 82310 ASSAY OF CALCIUM: CPT | Performed by: INTERNAL MEDICINE

## 2022-08-11 PROCEDURE — 87205 SMEAR GRAM STAIN: CPT | Performed by: INTERNAL MEDICINE

## 2022-08-11 PROCEDURE — 85027 COMPLETE CBC AUTOMATED: CPT | Performed by: INTERNAL MEDICINE

## 2022-08-11 PROCEDURE — 36415 COLL VENOUS BLD VENIPUNCTURE: CPT | Performed by: INTERNAL MEDICINE

## 2022-08-11 PROCEDURE — 82374 ASSAY BLOOD CARBON DIOXIDE: CPT | Performed by: INTERNAL MEDICINE

## 2022-08-11 PROCEDURE — G0378 HOSPITAL OBSERVATION PER HR: HCPCS

## 2022-08-11 ASSESSMENT — ACTIVITIES OF DAILY LIVING (ADL)
ADLS_ACUITY_SCORE: 18

## 2022-08-11 NOTE — PROGRESS NOTES
PRIMARY DIAGNOSIS: PNEUMONIA  OUTPATIENT/OBSERVATION GOALS TO BE MET BEFORE DISCHARGE:  1. Dyspnea improved and O2 sats >88% on RA or back to baseline O2 levels: Yes   SpO2: 95 %, O2 Device: None (Room air)     2. Tolerating oral abx or appropriate plans made outpatient infusion: No     3. Vitals signs normal or return to baseline: Yes     4. Short term supplemental O2 needed with activity at home: No     5. Tolerate oral intake to maintain hydration: Yes     6. Return to near baseline physical activity: Yes     Please review provider order for any additional goals.   Nurse to notify provider when observation goals have been met and patient is ready for discharge.

## 2022-08-11 NOTE — PROGRESS NOTES
PRIMARY DIAGNOSIS: PNEUMONIA  OUTPATIENT/OBSERVATION GOALS TO BE MET BEFORE DISCHARGE:  1. Dyspnea improved and O2 sats >88% on RA or back to baseline O2 levels: Yes   SpO2: 95 %, O2 Device: None (Room air)    2. Tolerating oral abx or appropriate plans made outpatient infusion: No    3. Vitals signs normal or return to baseline: Yes    4. Short term supplemental O2 needed with activity at home: No    5. Tolerate oral intake to maintain hydration: Yes    6. Return to near baseline physical activity: Yes    Discharge Planner Nurse   Safe discharge environment identified: Yes  Barriers to discharge: Yes- IV ABX       Entered by: Sandi Todd RN 08/11/2022 4:44 AM     Please review provider order for any additional goals.   Nurse to notify provider when observation goals have been met and patient is ready for discharge.

## 2022-08-11 NOTE — DISCHARGE SUMMARY
[facility] MEDICINE  DISCHARGE SUMMARY     Primary Care Physician: Christina Moore  Admission Date: 8/10/2022   Discharge Provider: Dillon Clark DO Discharge Date: 8/11/2022   Diet:   Active Diet and Nourishment Order   Procedures     Regular Diet Adult     Diet       Code Status: Full Code   Activity: DCACTIVITY: Activity as tolerated        Condition at Discharge: Stable     REASON FOR PRESENTATION(See Admission Note for Details)   77 year old old female presents after aspiration during outpatient medical procedure.    PRINCIPAL & ACTIVE DISCHARGE DIAGNOSES     Active Problems:    Aspiration pneumonitis (H)    Hypoxia  Mild hyponatremia    Mixed hyperlipidemia    Osteopenia    Prediabetes.     PENDING LABS     Unresulted Labs Ordered in the Past 30 Days of this Admission     Date and Time Order Name Status Description    8/10/2022  3:06 PM Respiratory Aerobic Bacterial Culture In process         PROCEDURES ( this hospitalization only)      RECOMMENDATIONS TO OUTPATIENT PROVIDER FOR F/U VISIT     Follow-up Appointments     Follow-up and recommended labs and tests       Follow up with primary care provider, Christina Moore, within 7 days   for hospital follow- up aspiration pneumonitis, mild hyponatremia after procedure.  The following labs/tests   are recommended: CBC, basic metabolic profile.           DISPOSITION     Home    SUMMARY OF HOSPITAL COURSE:      Alissa Jansen is a 77 year old old female with history of mixed hyperlipidemia, osteopenia, prediabetes, triple receptor negative right breast cancer, status post lumpectomy,, and chemotherapy and adjuvant radiation with no residual cancer on pathology, who presents after aspiration during outpatient colonoscopy.  She was transferred to [facility] for further evaluation due to resulting hypoxia.  Chest Xray, procalcitonin were unremarkable.  Patient elevated white blood cell count at 18,000.   Patient remained afebrile during hospitalization.  Initially patient required 4 L/min via nasal cannula after desaturation 81% in the emergency department.  Patient was treated with ceftriaxone and azithromycin and encouraged incentive spirometry.  Patient had complete resolution of hypoxia on day of discharge on 8/11/2022.  White blood cell count remained 18,000.  She had minimal cough.  Sputum culture was sent and pending at time of discharge.  Given negative chest x-ray, resolution of hypoxia patient likely suffered aspiration pneumonitis.  After discussion with patient will recommend 6 more days of antibiotic coverage with Augmentin.  Recommend follow-up with primary care in 7 days    Discharge Medications with Med changes:     Current Discharge Medication List      START taking these medications    Details   amoxicillin-clavulanate (AUGMENTIN) 875-125 MG tablet Take 1 tablet by mouth 2 times daily  Qty: 12 tablet, Refills: 0    Associated Diagnoses: Aspiration pneumonitis (H)         CONTINUE these medications which have NOT CHANGED    Details   calcium carbonate-vitamin D3 (CALCIUM 600 + D,3,) 600 mg calcium- 200 unit cap Take 1 capsule by mouth daily      cholecalciferol, vitamin D3, 1,000 unit tablet [CHOLECALCIFEROL, VITAMIN D3, 1,000 UNIT TABLET] Take 2,000 Units by mouth daily.      multivitamin with minerals (THERA-M) 9 mg iron-400 mcg Tab tablet [MULTIVITAMIN WITH MINERALS (THERA-M) 9 MG IRON-400 MCG TAB TABLET] Take 1 tablet by mouth daily.  Refills: 0      simvastatin (ZOCOR) 20 MG tablet Take 1 tablet (20 mg) by mouth At Bedtime  Qty: 90 tablet, Refills: 3    Associated Diagnoses: Mixed hyperlipidemia                   Rationale for medication changes:      Augmentin.  Patient at risk for aspiration pneumonia        Consults     None    Immunizations given this encounter     Most Recent Immunizations   Administered Date(s) Administered     COVID-19,PF,Pfizer (12+ Yrs) 12/15/2021     Flu 65+ Years  10/31/2019     Flu, Unspecified 09/15/2020     Influenza (High Dose) 3 valent vaccine 11/09/2018     Influenza (IIV3) PF 11/06/2013     Influenza Vaccine IM > 6 months Valent IIV4 (Alfuria,Fluzone) 09/15/2020     Influenza, Quad, High Dose, Pf, 65yr+ (Fluzone HD) 09/14/2021     Pneumo Conj 13-V (2010&after) 12/16/2015     Pneumococcal 23 valent 04/14/2011     Td (Adult), Adsorbed 04/14/2011     Tdap (Adacel,Boostrix) 02/10/2022     Zoster vaccine, live 06/10/2011           Anticoagulation Information      Recent INR results: No results for input(s): INR in the last 168 hours.  Warfarin doses (if applicable) or name of other anticoagulant: n/a      SIGNIFICANT IMAGING FINDINGS     Results for orders placed or performed during the hospital encounter of 08/10/22   XR Chest Port 1 View    Impression    IMPRESSION: No pneumothorax. No significant airspace disease. Reticular interstitial opacities suggest background fibrosis. Cardiomediastinal silhouette is normal in size.        SIGNIFICANT LABORATORY FINDINGS     Most Recent 3 CBC's:Recent Labs   Lab Test 08/11/22  0738 08/10/22  1414 02/10/22  0944   WBC 18.8* 18.3* 3.9*   HGB 12.6 13.6 13.1   MCV 96 97 103*    197 212     Most Recent 3 BMP's:Recent Labs   Lab Test 08/11/22  0738 08/10/22  1414 02/10/22  0943   * 136 141   POTASSIUM 3.6 3.7 4.3   CHLORIDE 100 104 105   CO2 24 21* 26   BUN 8 8 11   CR 0.79 0.80 0.79   ANIONGAP 10 11 10   TATI 9.4 9.4 9.5    135* 97     Most Recent 2 LFT's:Recent Labs   Lab Test 08/10/22  1414 02/10/22  0943   AST 26 25   ALT 22 23   ALKPHOS 83 70   BILITOTAL 0.6 0.7     Discharge Orders        Reason for your hospital stay    Postprocedural aspiration, aspiration pneumonitis, hypoxia.     Follow-up and recommended labs and tests     Follow up with primary care provider, Christina Moore, within 7 days for hospital follow- up aspiration pneumonitis.  The following labs/tests are recommended: CBC.     Activity    Your  activity upon discharge: activity as tolerated     When to contact your care team    Call your primary doctor if you have any of the following: temperature greater than 100.4 degrees Fahrenheit or  increased shortness of breath.     Incentive Spirometry    Continue to use incentive spirometer 4 times a day Until return to normal activity     Diet    Follow this diet upon discharge: Orders Placed This Encounter      Regular Diet Adult       Examination   Physical Exam   Temp:  [97.9  F (36.6  C)-99.3  F (37.4  C)] 99  F (37.2  C)  Pulse:  [] 98  Resp:  [11-30] 20  BP: (111-152)/(58-83) 152/78  SpO2:  [81 %-98 %] 96 %  Wt Readings from Last 1 Encounters:   08/11/22 82 kg (180 lb 11.2 oz)     General Appearance:  Alert, cooperative, no distress, appears stated age   Head:  Normocephalic, without obvious abnormality, atraumatic   Eyes:  PERRL, conjunctiva/corneas clear, EOM's intact   Throat: Lips, mucosa, and tongue normal; teeth and gums normal   Neck: Supple, symmetrical, trachea midline, no adenopathy, thyroid: not enlarged, symmetric, no carotid bruit or JVD   Back:   Symmetric, no curvature, ROM normal, no CVA tenderness   Lungs:    Crackles at lung bases, respirations unlabored   Chest Wall:  No tenderness or deformity   Heart:  Regular rate and rhythm, S1, S2 normal,no murmur, rub or gallop   Abdomen:   Soft, non-tender, bowel sounds active all four quadrants,  no masses, no organomegaly   Extremities: Extremities normal, atraumatic, no cyanosis or edema   Skin: Skin color, texture, turgor normal, no rashes or ulcers noted over visible skin areas.    Neurologic: Alert and oriented X 3, Moves all 4 extremities     Please see EMR for more detailed significant labs, imaging, consultant notes etc.    IDillon DO, personally saw the patient today and spent greater than 30 minutes discharging this patient.    Dillon Clark DO  [facility]    CC:Christina Moore

## 2022-08-11 NOTE — PROGRESS NOTES
PRIMARY DIAGNOSIS: PNEUMONIA  OUTPATIENT/OBSERVATION GOALS TO BE MET BEFORE DISCHARGE:  1. Dyspnea improved and O2 sats >88% on RA or back to baseline O2 levels: Yes   SpO2: 97 %, O2 Device: None (Room air)    2. Tolerating oral abx or appropriate plans made outpatient infusion: No    3. Vitals signs normal or return to baseline: Yes    4. Short term supplemental O2 needed with activity at home: No    5. Tolerate oral intake to maintain hydration: Yes    6. Return to near baseline physical activity: Yes    Discharge Planner Nurse   Safe discharge environment identified: Yes  Barriers to discharge: Yes- IV ABX       Entered by: Sandi Todd RN 08/11/2022 12:35 AM     Please review provider order for any additional goals.   Nurse to notify provider when observation goals have been met and patient is ready for discharge.

## 2022-08-11 NOTE — PROGRESS NOTES
Care Management Discharge Note    Discharge Date: 08/11/2022       Discharge Disposition: Home    Discharge Services: None    Discharge DME: None    Discharge Transportation:  friend     Private pay costs discussed: Not applicable

## 2022-08-11 NOTE — PLAN OF CARE
Pt meets discharge criteria     VSS on RA. Aox4. Up independently. Denies pain. Tolerating regular diet; denies N/V. Continent of B/B; pt voiding in BR; +flatus. Removed IV before discharge.    I reviewed discharge instructions with patient and answered any remaining questions     Pt was discharged with all personal belongings.

## 2022-08-11 NOTE — PLAN OF CARE
Problem: Respiratory Compromise (Pneumonia)  Goal: Effective Oxygenation and Ventilation  Outcome: Ongoing, Progressing   Patient has been on room air since admission to the unit. O2 saturations between 93-98% while monitoring. Incentive spirometer introduced to patient and RN performed educational teaching on proper technique and patient was able to repeat back/perform.     Pt denies any SOB or difficulty breathing. Pt states she wants to go home on 8/11/2022 and her friend Allyn will pick her up.    Sandi Todd RN  8/11/2022  4:19 AM

## 2022-08-25 ENCOUNTER — PATIENT OUTREACH (OUTPATIENT)
Dept: FAMILY MEDICINE | Facility: CLINIC | Age: 77
End: 2022-08-25

## 2022-08-25 NOTE — TELEPHONE ENCOUNTER
Chief Complaint   Patient presents with     Medication Reconciliation     Celia Zavala RN on 8/25/2022 at 10:56 AM

## 2022-08-26 ENCOUNTER — PATIENT OUTREACH (OUTPATIENT)
Dept: FAMILY MEDICINE | Facility: CLINIC | Age: 77
End: 2022-08-26

## 2022-08-29 ENCOUNTER — ONCOLOGY VISIT (OUTPATIENT)
Dept: ONCOLOGY | Facility: CLINIC | Age: 77
End: 2022-08-29
Attending: NURSE PRACTITIONER
Payer: COMMERCIAL

## 2022-08-29 VITALS
SYSTOLIC BLOOD PRESSURE: 180 MMHG | WEIGHT: 182.7 LBS | HEIGHT: 65 IN | DIASTOLIC BLOOD PRESSURE: 100 MMHG | HEART RATE: 88 BPM | BODY MASS INDEX: 30.44 KG/M2 | OXYGEN SATURATION: 100 %

## 2022-08-29 DIAGNOSIS — C50.411 MALIGNANT NEOPLASM OF UPPER-OUTER QUADRANT OF RIGHT BREAST IN FEMALE, ESTROGEN RECEPTOR NEGATIVE (H): Primary | ICD-10-CM

## 2022-08-29 DIAGNOSIS — Z17.1 MALIGNANT NEOPLASM OF UPPER-OUTER QUADRANT OF RIGHT BREAST IN FEMALE, ESTROGEN RECEPTOR NEGATIVE (H): Primary | ICD-10-CM

## 2022-08-29 DIAGNOSIS — T45.1X5A PERIPHERAL NEUROPATHY DUE TO CHEMOTHERAPY (H): ICD-10-CM

## 2022-08-29 DIAGNOSIS — Z12.31 ENCOUNTER FOR SCREENING MAMMOGRAM FOR MALIGNANT NEOPLASM OF BREAST: ICD-10-CM

## 2022-08-29 DIAGNOSIS — G62.0 PERIPHERAL NEUROPATHY DUE TO CHEMOTHERAPY (H): ICD-10-CM

## 2022-08-29 PROCEDURE — G0463 HOSPITAL OUTPT CLINIC VISIT: HCPCS

## 2022-08-29 PROCEDURE — 99214 OFFICE O/P EST MOD 30 MIN: CPT | Performed by: NURSE PRACTITIONER

## 2022-08-29 ASSESSMENT — PAIN SCALES - GENERAL: PAINLEVEL: NO PAIN (0)

## 2022-08-29 NOTE — PROGRESS NOTES
[facility] Hematology and Oncology Progress Note    Patient: Alissa Jansen  MRN: [id number]  Date of Service: Aug 29, 2022         Reason for Visit:    Scheduled f/up    Assessment and Plan:      1. Stage IIA, triple negative, G3, invasive ductal carcinoma UOQ (R) breast ... ~6.5 years s/p (R) lumpectomy, followed by adjuvant chemotherapy, re-resection and breast conservation EBRT.    77 year old     Ms. Alissa Jansen presents alone in scheduled follow-up.  She was hospitalized 08/10-08/11 d/t aspiration during outpatient colonoscopy. Overall she feels that she is doing well.  Her peripheral neuropathy symptoms in her feet remain about the same - still has some difficulty with walking d/t a mild foot drop.  However she remains active.  She denies cough, fever, chills, unusual headaches, visual or mentation disturbance, bowel or bladder issues, rash.    11/18/2021 (B) screening mammogram - benign findings.    Pat is now 6+ years s/p adjuvant therapy    Clinically and radiographically ANANTH.    We again discussed that since she is > 5 years out from completion of treatment, risk of recurrence of the breast cancer is quite low and she could now have follow-up with her PCP.  She was adamant about seeing us in follow up for the time being.    Peripheral neuropathy r/t prior chemotherapy - stable numbness and tingling of tips of her fingers and feet.  No pain.    Unfortunately at this point, 6+ years out from chemotherapy, it is unlikely the neuropathy will improve much more.      I encouraged her to remain as physically active as she can be.    1-year follow up with yearly mammogram.  No labs.    2. Bone health:    08/27/2021 DEXA scan - bilateral hip and upper lumbar spine osteopenia.    Continue daily 0227-8997 mg calcium + 2101-3687 international unit(s) vitamin D supplements.    Oncologic History:    1. Stage IIA, triple negative, G3, invasive ductal carcinoma UOQ (R) breast     01/18/2016 - (R)  lumpectomy and sentinel lymph node procedure (Dr. Jerry).    Final pathology showed invasive ductal carcinoma with metaplastic features, grade 3, triple negative, with a 3 mm focus involving the inferior margin.  Final stage was Stage IIA (T2, N0, cM0).    2016, June - completed 4 cycles dd AC chemotherapy followed by 12 weeks of Taxol chemotherapy.    07/06/2016 underwent reresection - no residual cancer seen in pathology.      09/06/2016 - completed breast conservation EBRT.     ECOG Performance:    2 - Ambulatory and independent in all ADLs; cannot work; up > 50% of the time    Pain:    Pain Score: No Pain (0)    Encounter Diagnoses:    Problem List Items Addressed This Visit        Nervous and Auditory    Peripheral neuropathy due to chemotherapy (H)       Other    Malignant neoplasm of upper-outer quadrant of right breast in female, estrogen receptor negative (H) - Primary    Relevant Orders    *MA Screening Digital Bilateral      Other Visit Diagnoses     Encounter for screening mammogram for malignant neoplasm of breast         Relevant Orders    *MA Screening Digital Bilateral             32 minutes spent on the date of the encounter doing chart review, history and exam, documentation and further activities as noted above.    Reggie Osorio, CNP     CC: Christina Moore MD   ____________________________________________________________________________    Review of Systems:    No fever or night sweats.  No loss of weight.  No lumps or bumps anywhere.  No unusual headaches or eyesight issues.  No dizziness.  No bleeding from the nose.  No sores in the mouth. No problems with swallowing.  No chest pain. No shortness of breath. No cough.  No abdominal pain. No nausea or vomiting.  No diarrhea or constipation.  No blood in stool or black colored stools.  No problems passing urine.  No numbness or tingling in hands or feet.  No skin rashes.    A 14 point review of systems is otherwise negative.    Past  "History:    Past Medical History:   Diagnosis Date     Aspiration pneumonitis (H) 08/10/2022    aspirated during colonoscopy, hypoxia     Closed fracture of lateral malleolus of right ankle 09/02/2004    fell in a hole golfing.     Colon polyps 09/09/2008    2 polyps found on colonoscopy.     Gastric erosions 01/28/2020    Reactive gastropathy.     H/O colonoscopy 01/01/2014    repeat 5 years     Hiatal hernia 01/28/2020     Hx antineoplastic chemotherapy 01/01/2016    Right Breast     Hx of radiation therapy 06/01/2016    Right Breast     Hyperlipidemia      Malignant neoplasm of upper-outer quadrant of right breast in female, estrogen receptor negative (H) 12/22/2015     Menopause     1996     Osteoarthritis      Osteopenia of multiple sites 09/2021    repeat bdt 2 yrs       Past Surgical History:   Procedure Laterality Date     BIOPSY BREAST Right 2015    dx breast cancer     COLONOSCOPY W/ POLYPECTOMY  05/16/2019     DENTAL SURGERY      2010     ESOPHAGOSCOPY, GASTROSCOPY, DUODENOSCOPY (EGD), COMBINED  01/28/2020    Large hiatal hernia.  Few stomach erosions.  Biopsy showed reactive gastropathy.  H. pylori negative.     KNEE SURGERY Left 1975    For \"torn ligaments\"     LUMPECTOMY BREAST Right 01/18/2016    Dr. Jerry.     LUMPECTOMY BREAST Right 07/06/2016    Re-excision lumpectomy and port removal, Dr. Jerry     PHACOEMULSIFICATION CLEAR CORNEA WITH STANDARD INTRAOCULAR LENS IMPLANT Bilateral Left 11/19/13, Right 12/3/13     SENTINEL LYMPH NODE BIOPSY Right 01/18/2016    Dr. Jerry.     Physical Exam:    BP (!) 180/100 (BP Location: Right arm, Patient Position: Sitting, Cuff Size: Adult Regular)   Pulse 88   Ht 1.651 m (5' 5\")   Wt 82.9 kg (182 lb 11.2 oz)   LMP  (LMP Unknown)   SpO2 100%   BMI 30.40 kg/m      GENERAL:   Alert and oriented. Seated comfortably. In no distress.  Very pleasant.     HEENT:   Atraumatic and normocephalic. BRITNI, EOMI. No pallor. No icterus. No mucosal lesion or tonsillar " enlargement.     LYMPH NODES:  No palpable cervical, axillary or inguinal lymphadenopathy.     CHEST:   Lungs clear to auscultation bilaterally.     CVS:    S1 and S2 heard. Regular rate and rhythm. No murmur or gallop or rub heard. No peripheral edema.     ABDOMEN:   Soft. Not tender. Not distended.  No palpable hepatomegaly or splenomegaly.     EXTREMITIES: Warm.     SKIN:    No rash, bruising or purpura noted.  Full head of hair.    Lab Results:    No results found for this or any previous visit (from the past 168 hour(s)).    Imaging:    No new imaging.

## 2022-08-29 NOTE — PROGRESS NOTES
"Oncology Rooming Note    August 29, 2022 2:50 PM   Alissa Jansen is a 77 year old female who presents for:    Chief Complaint   Patient presents with     Oncology Clinic Visit     Malignant neoplasm of upper-outer quadrant of right breast in female, estrogen receptor negative,  Benign Adenomatous Polyp Of The Large Intestine     Initial Vitals: BP (!) 180/100 (BP Location: Right arm, Patient Position: Sitting, Cuff Size: Adult Regular)   Pulse 88   Ht 1.651 m (5' 5\")   Wt 82.9 kg (182 lb 11.2 oz)   LMP  (LMP Unknown)   SpO2 100%   BMI 30.40 kg/m   Estimated body mass index is 30.4 kg/m  as calculated from the following:    Height as of this encounter: 1.651 m (5' 5\").    Weight as of this encounter: 82.9 kg (182 lb 11.2 oz). Body surface area is 1.95 meters squared.  No Pain (0) Comment: Data Unavailable   No LMP recorded (lmp unknown). Patient is postmenopausal.  Allergies reviewed: Yes  Medications reviewed: Yes    Medications: Medication refills not needed today.  Pharmacy name entered into [employer]: [facility] [address] [place] [address] [id number] [facility]    Clinical concerns: 1 year follow up      Sandi Claire MA            "

## 2022-08-29 NOTE — LETTER
"    8/29/2022         RE: Alissa Jansen  [address] [facility] [address]        Dear Colleague,    Thank you for referring your patient, Alissa Jansen, to the [facility]. Please see a copy of my visit note below.    Oncology Rooming Note    August 29, 2022 2:50 PM   Alissa Jansen is a 77 year old female who presents for:    Chief Complaint   Patient presents with     Oncology Clinic Visit     Malignant neoplasm of upper-outer quadrant of right breast in female, estrogen receptor negative,  Benign Adenomatous Polyp Of The Large Intestine     Initial Vitals: BP (!) 180/100 (BP Location: Right arm, Patient Position: Sitting, Cuff Size: Adult Regular)   Pulse 88   Ht 1.651 m (5' 5\")   Wt 82.9 kg (182 lb 11.2 oz)   LMP  (LMP Unknown)   SpO2 100%   BMI 30.40 kg/m   Estimated body mass index is 30.4 kg/m  as calculated from the following:    Height as of this encounter: 1.651 m (5' 5\").    Weight as of this encounter: 82.9 kg (182 lb 11.2 oz). Body surface area is 1.95 meters squared.  No Pain (0) Comment: Data Unavailable   No LMP recorded (lmp unknown). Patient is postmenopausal.  Allergies reviewed: Yes  Medications reviewed: Yes    Medications: Medication refills not needed today.  Pharmacy name entered into [employer]: [facility] [address] [place] [address] [id number] [facility]    Clinical concerns: 1 year follow up      Sandi lCaire MA              [facility] Hematology and Oncology Progress Note    Patient: Alissa Jansen  MRN: [id number]  Date of Service: Aug 29, 2022         Reason for Visit:    Scheduled f/up    Assessment and Plan:      1. Stage IIA, triple negative, G3, invasive ductal carcinoma UOQ (R) breast ... ~6.5 years s/p (R) lumpectomy, followed by adjuvant chemotherapy, re-resection and breast conservation EBRT.    77 year old     Ms. Alissa Jansen presents alone in scheduled follow-up.  She was hospitalized 08/10-08/11 d/t " aspiration during outpatient colonoscopy. Overall she feels that she is doing well.  Her peripheral neuropathy symptoms in her feet remain about the same - still has some difficulty with walking d/t a mild foot drop.  However she remains active.  She denies cough, fever, chills, unusual headaches, visual or mentation disturbance, bowel or bladder issues, rash.    11/18/2021 (B) screening mammogram - benign findings.    Pat is now 6+ years s/p adjuvant therapy    Clinically and radiographically ANANTH.    We again discussed that since she is > 5 years out from completion of treatment, risk of recurrence of the breast cancer is quite low and she could now have follow-up with her PCP.  She was adamant about seeing us in follow up for the time being.    Peripheral neuropathy r/t prior chemotherapy - stable numbness and tingling of tips of her fingers and feet.  No pain.    Unfortunately at this point, 6+ years out from chemotherapy, it is unlikely the neuropathy will improve much more.      I encouraged her to remain as physically active as she can be.    1-year follow up with yearly mammogram.  No labs.    2. Bone health:    08/27/2021 DEXA scan - bilateral hip and upper lumbar spine osteopenia.    Continue daily 8621-0376 mg calcium + 1135-2860 international unit(s) vitamin D supplements.    Oncologic History:    1. Stage IIA, triple negative, G3, invasive ductal carcinoma UOQ (R) breast     01/18/2016 - (R) lumpectomy and sentinel lymph node procedure (Dr. Jerry).    Final pathology showed invasive ductal carcinoma with metaplastic features, grade 3, triple negative, with a 3 mm focus involving the inferior margin.  Final stage was Stage IIA (T2, N0, cM0).    2016, June - completed 4 cycles dd AC chemotherapy followed by 12 weeks of Taxol chemotherapy.    07/06/2016 underwent reresection - no residual cancer seen in pathology.      09/06/2016 - completed breast conservation EBRT.     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    Reggie Osorio, CNP     CC: Christina Moore MD   ____________________________________________________________________________    Review of Systems:    No fever or night sweats.  No loss of weight.  No lumps or bumps anywhere.  No unusual headaches or eyesight issues.  No dizziness.  No bleeding from the nose.  No sores in the mouth. No problems with swallowing.  No chest pain. No shortness of breath. No cough.  No abdominal pain. No nausea or vomiting.  No diarrhea or constipation.  No blood in stool or black colored stools.  No problems passing urine.  No numbness or tingling in hands or feet.  No skin rashes.    A 14 point review of systems is otherwise negative.    Past History:    Past Medical History:   Diagnosis Date     Aspiration pneumonitis (H) 08/10/2022    aspirated during colonoscopy, hypoxia     Closed fracture of lateral malleolus of right ankle 09/02/2004    fell in a hole golfing.     Colon polyps 09/09/2008    2 polyps found on colonoscopy.     Gastric erosions 01/28/2020    Reactive gastropathy.     H/O colonoscopy 01/01/2014    repeat 5 years     Hiatal hernia 01/28/2020     Hx antineoplastic chemotherapy 01/01/2016    Right Breast     Hx of radiation therapy 06/01/2016     "Right Breast     Hyperlipidemia      Malignant neoplasm of upper-outer quadrant of right breast in female, estrogen receptor negative (H) 12/22/2015     Menopause     1996     Osteoarthritis      Osteopenia of multiple sites 09/2021    repeat bdt 2 yrs       Past Surgical History:   Procedure Laterality Date     BIOPSY BREAST Right 2015    dx breast cancer     COLONOSCOPY W/ POLYPECTOMY  05/16/2019     DENTAL SURGERY      2010     ESOPHAGOSCOPY, GASTROSCOPY, DUODENOSCOPY (EGD), COMBINED  01/28/2020    Large hiatal hernia.  Few stomach erosions.  Biopsy showed reactive gastropathy.  H. pylori negative.     KNEE SURGERY Left 1975    For \"torn ligaments\"     LUMPECTOMY BREAST Right 01/18/2016    Dr. Jerry.     LUMPECTOMY BREAST Right 07/06/2016    Re-excision lumpectomy and port removal, Dr. Jerry     PHACOEMULSIFICATION CLEAR CORNEA WITH STANDARD INTRAOCULAR LENS IMPLANT Bilateral Left 11/19/13, Right 12/3/13     SENTINEL LYMPH NODE BIOPSY Right 01/18/2016    Dr. Jerry.     Physical Exam:    BP (!) 180/100 (BP Location: Right arm, Patient Position: Sitting, Cuff Size: Adult Regular)   Pulse 88   Ht 1.651 m (5' 5\")   Wt 82.9 kg (182 lb 11.2 oz)   LMP  (LMP Unknown)   SpO2 100%   BMI 30.40 kg/m      GENERAL:   Alert and oriented. Seated comfortably. In no distress.  Very pleasant.     HEENT:   Atraumatic and normocephalic. BRITNI, EOMI. No pallor. No icterus. No mucosal lesion or tonsillar enlargement.     LYMPH NODES:  No palpable cervical, axillary or inguinal lymphadenopathy.     CHEST:   Lungs clear to auscultation bilaterally.     CVS:    S1 and S2 heard. Regular rate and rhythm. No murmur or gallop or rub heard. No peripheral edema.     ABDOMEN:   Soft. Not tender. Not distended.  No palpable hepatomegaly or splenomegaly.     EXTREMITIES: Warm.     SKIN:    No rash, bruising or purpura noted.  Full head of hair.    Lab Results:    No results found for this or any previous visit (from the past 168 " hour(s)).    Imaging:    No new imaging.        Again, thank you for allowing me to participate in the care of your patient.        Sincerely,        Reggie Osorio CNP

## 2022-09-25 ENCOUNTER — HEALTH MAINTENANCE LETTER (OUTPATIENT)
Age: 77
End: 2022-09-25

## 2022-11-21 ENCOUNTER — HOSPITAL ENCOUNTER (OUTPATIENT)
Dept: MAMMOGRAPHY | Facility: CLINIC | Age: 77
Discharge: HOME | End: 2022-11-21
Attending: NURSE PRACTITIONER | Admitting: NURSE PRACTITIONER
Payer: COMMERCIAL

## 2022-11-21 DIAGNOSIS — C50.411 MALIGNANT NEOPLASM OF UPPER-OUTER QUADRANT OF RIGHT BREAST IN FEMALE, ESTROGEN RECEPTOR NEGATIVE (H): ICD-10-CM

## 2022-11-21 DIAGNOSIS — Z17.1 MALIGNANT NEOPLASM OF UPPER-OUTER QUADRANT OF RIGHT BREAST IN FEMALE, ESTROGEN RECEPTOR NEGATIVE (H): ICD-10-CM

## 2022-11-21 DIAGNOSIS — Z12.31 ENCOUNTER FOR SCREENING MAMMOGRAM FOR MALIGNANT NEOPLASM OF BREAST: ICD-10-CM

## 2022-11-21 PROCEDURE — 77067 SCR MAMMO BI INCL CAD: CPT

## 2023-05-03 DIAGNOSIS — E78.2 MIXED HYPERLIPIDEMIA: ICD-10-CM

## 2023-05-03 RX ORDER — SIMVASTATIN 20 MG
20 TABLET ORAL AT BEDTIME
Qty: 90 TABLET | Refills: 3 | Status: SHIPPED | OUTPATIENT
Start: 2023-05-03 | End: 2024-06-06

## 2023-05-03 NOTE — TELEPHONE ENCOUNTER
Refill Request  Medication name: Pending Prescriptions:                       Disp   Refills    simvastatin (ZOCOR) 20 MG tablet          90 tab*3            Sig: Take 1 tablet (20 mg) by mouth At Bedtime    Requested Pharmacy:  [facility] [address] [place] [address] [id number] [facility]     Pt was scheduled with Dr Marie on 5/11/23, but had to reschedule due to change in Dr Marie schedule.    Pt needs refill or will not make it to new appt 5/31/23

## 2023-05-08 ENCOUNTER — HEALTH MAINTENANCE LETTER (OUTPATIENT)
Age: 78
End: 2023-05-08

## 2023-05-24 ASSESSMENT — ENCOUNTER SYMPTOMS
COUGH: 0
FREQUENCY: 0
ABDOMINAL PAIN: 0
DYSURIA: 0
NERVOUS/ANXIOUS: 0
WEAKNESS: 0
CHILLS: 0
FEVER: 0
DIARRHEA: 0
HEMATURIA: 0
HEMATOCHEZIA: 0
NAUSEA: 0
BREAST MASS: 0
PARESTHESIAS: 0
MYALGIAS: 0
ARTHRALGIAS: 0
HEARTBURN: 0
JOINT SWELLING: 0
EYE PAIN: 0
DIZZINESS: 0
CONSTIPATION: 0
SHORTNESS OF BREATH: 0
PALPITATIONS: 0
HEADACHES: 0
SORE THROAT: 0

## 2023-05-24 ASSESSMENT — ACTIVITIES OF DAILY LIVING (ADL): CURRENT_FUNCTION: NO ASSISTANCE NEEDED

## 2023-05-31 ENCOUNTER — OFFICE VISIT (OUTPATIENT)
Dept: FAMILY MEDICINE | Facility: CLINIC | Age: 78
End: 2023-05-31
Payer: COMMERCIAL

## 2023-05-31 VITALS
OXYGEN SATURATION: 99 % | HEIGHT: 64 IN | TEMPERATURE: 98.9 F | SYSTOLIC BLOOD PRESSURE: 138 MMHG | HEART RATE: 85 BPM | BODY MASS INDEX: 30.39 KG/M2 | WEIGHT: 178 LBS | RESPIRATION RATE: 16 BRPM | DIASTOLIC BLOOD PRESSURE: 90 MMHG

## 2023-05-31 DIAGNOSIS — R73.09 OTHER ABNORMAL GLUCOSE: ICD-10-CM

## 2023-05-31 DIAGNOSIS — Z00.00 ROUTINE GENERAL MEDICAL EXAMINATION AT A HEALTH CARE FACILITY: Primary | ICD-10-CM

## 2023-05-31 DIAGNOSIS — E78.2 MIXED HYPERLIPIDEMIA: ICD-10-CM

## 2023-05-31 DIAGNOSIS — M25.571 PAIN IN JOINT, ANKLE AND FOOT, RIGHT: ICD-10-CM

## 2023-05-31 LAB
CHOLEST SERPL-MCNC: 163 MG/DL
FASTING STATUS PATIENT QL REPORTED: YES
GLUCOSE SERPL-MCNC: 102 MG/DL (ref 70–99)
HDLC SERPL-MCNC: 56 MG/DL
LDLC SERPL CALC-MCNC: 95 MG/DL
NONHDLC SERPL-MCNC: 107 MG/DL
TRIGL SERPL-MCNC: 62 MG/DL

## 2023-05-31 PROCEDURE — 80061 LIPID PANEL: CPT | Performed by: FAMILY MEDICINE

## 2023-05-31 PROCEDURE — G0439 PPPS, SUBSEQ VISIT: HCPCS | Performed by: FAMILY MEDICINE

## 2023-05-31 PROCEDURE — 82947 ASSAY GLUCOSE BLOOD QUANT: CPT | Performed by: FAMILY MEDICINE

## 2023-05-31 PROCEDURE — 36415 COLL VENOUS BLD VENIPUNCTURE: CPT | Performed by: FAMILY MEDICINE

## 2023-05-31 ASSESSMENT — ENCOUNTER SYMPTOMS
COUGH: 0
NAUSEA: 0
PARESTHESIAS: 0
HEARTBURN: 0
PALPITATIONS: 0
NERVOUS/ANXIOUS: 0
DIZZINESS: 0
FREQUENCY: 0
WEAKNESS: 0
JOINT SWELLING: 0
HEMATURIA: 0
HEADACHES: 0
HEMATOCHEZIA: 0
SORE THROAT: 0
ABDOMINAL PAIN: 0
DIARRHEA: 0
DYSURIA: 0
ARTHRALGIAS: 0
FEVER: 0
MYALGIAS: 0
BREAST MASS: 0
EYE PAIN: 0
CONSTIPATION: 0
CHILLS: 0
SHORTNESS OF BREATH: 0

## 2023-05-31 ASSESSMENT — PAIN SCALES - GENERAL: PAINLEVEL: NO PAIN (0)

## 2023-05-31 ASSESSMENT — ACTIVITIES OF DAILY LIVING (ADL): CURRENT_FUNCTION: NO ASSISTANCE NEEDED

## 2023-05-31 NOTE — PROGRESS NOTES
"SUBJECTIVE:   Kylah is a 77 year old who presents for Preventive Visit.      5/31/2023     9:22 AM   Additional Questions   Roomed by trisha matamoros cma   Patient has been advised of split billing requirements and indicates understanding: Yes  Are you in the first 12 months of your Medicare coverage?  No    Healthy Habits:     In general, how would you rate your overall health?  Good    Frequency of exercise:  2-3 days/week    Duration of exercise:  15-30 minutes    Do you usually eat at least 4 servings of fruit and vegetables a day, include whole grains    & fiber and avoid regularly eating high fat or \"junk\" foods?  No    Taking medications regularly:  Yes    Medication side effects:  None    Ability to successfully perform activities of daily living:  No assistance needed    Home Safety:  No safety concerns identified    Hearing Impairment:  No hearing concerns    In the past 6 months, have you been bothered by leaking of urine? Yes    In general, how would you rate your overall mental or emotional health?  Excellent      PHQ-2 Total Score: 0    Additional concerns today:  Yes        Have you ever done Advance Care Planning? (For example, a Health Directive, POLST, or a discussion with a medical provider or your loved ones about your wishes): Yes, advance care planning is on file.       Fall risk  Fallen 2 or more times in the past year?: No  Any fall with injury in the past year?: No    Cognitive Screening   1) Repeat 3 items (Leader, Season, Table)    2) Clock draw: NORMAL  3) 3 item recall: Recalls 3 objects  Results: 3 items recalled: COGNITIVE IMPAIRMENT LESS LIKELY    Mini-CogTM Copyright KO Lombardi. Licensed by the author for use in [facility]; reprinted with permission (lidia@.[facility]). All rights reserved.      Do you have sleep apnea, excessive snoring or daytime drowsiness?: no    Reviewed and updated as needed this visit by clinical staff   Tobacco  Allergies  Meds  Problems  Med Hx  Surg Hx  " Fam Hx          Reviewed and updated as needed this visit by Provider   Tobacco  Allergies  Meds  Problems  Med Hx  Surg Hx  Fam Hx         Social History     Tobacco Use     Smoking status: Never     Smokeless tobacco: Never     Tobacco comments:     No exposure   Vaping Use     Vaping status: Never Used   Substance Use Topics     Alcohol use: Yes     Alcohol/week: 1.7 standard drinks of alcohol             5/24/2023     9:57 AM   Alcohol Use   Prescreen: >3 drinks/day or >7 drinks/week? No     Do you have a current opioid prescription? No  Do you use any other controlled substances or medications that are not prescribed by a provider? None              Current providers sharing in care for this patient include:   Patient Care Team:  Mario Marie MD as PCP - General  Kristy Mina MD as MD (Hematology & Oncology)  Lou Shelton MD as Assigned PCP  Reggie Osorio CNP as Assigned Cancer Care Provider    The following health maintenance items are reviewed in Epic and correct as of today:  Health Maintenance   Topic Date Due     COVID-19 Vaccine (4 - Pfizer series) 05/31/2024 (Originally 2/9/2022)     ZOSTER IMMUNIZATION (3 of 3) 07/17/2023     MAMMO SCREENING  11/21/2023     MEDICARE ANNUAL WELLNESS VISIT  05/31/2024     ANNUAL REVIEW OF HM ORDERS  05/31/2024     FALL RISK ASSESSMENT  05/31/2024     LIPID  05/31/2028     ADVANCE CARE PLANNING  05/31/2028     DTAP/TDAP/TD IMMUNIZATION (2 - Td or Tdap) 02/10/2032     DEXA  08/27/2036     HEPATITIS C SCREENING  Completed     PHQ-2 (once per calendar year)  Completed     INFLUENZA VACCINE  Completed     Pneumococcal Vaccine: 65+ Years  Completed     IPV IMMUNIZATION  Aged Out     MENINGITIS IMMUNIZATION  Aged Out     COLORECTAL CANCER SCREENING  Discontinued             Pertinent mammograms are reviewed under the imaging tab.    Review of Systems   Constitutional: Negative for chills and fever.   HENT: Negative for congestion, ear pain, hearing loss and sore  "throat.    Eyes: Negative for pain and visual disturbance.   Respiratory: Negative for cough and shortness of breath.    Cardiovascular: Negative for chest pain, palpitations and peripheral edema.   Gastrointestinal: Negative for abdominal pain, constipation, diarrhea, heartburn, hematochezia and nausea.   Breasts:  Negative for tenderness, breast mass and discharge.   Genitourinary: Negative for dysuria, frequency, genital sores, hematuria, pelvic pain, urgency, vaginal bleeding and vaginal discharge.   Musculoskeletal: Negative for arthralgias, joint swelling and myalgias.   Skin: Negative for rash.   Neurological: Negative for dizziness, weakness, headaches and paresthesias.   Psychiatric/Behavioral: Negative for mood changes. The patient is not nervous/anxious.          OBJECTIVE:   BP (!) 138/90   Pulse 85   Temp 98.9  F (37.2  C)   Resp 16   Ht 1.626 m (5' 4\")   Wt 80.7 kg (178 lb)   LMP  (LMP Unknown)   SpO2 99%   BMI 30.55 kg/m   Estimated body mass index is 30.55 kg/m  as calculated from the following:    Height as of this encounter: 1.626 m (5' 4\").    Weight as of this encounter: 80.7 kg (178 lb).  Physical Exam  GENERAL: healthy, alert and no distress  NECK: no adenopathy, no asymmetry, masses, or scars and thyroid normal to palpation  RESP: lungs clear to auscultation - no rales, rhonchi or wheezes  CV: regular rate and rhythm, normal S1 S2, no S3 or S4, no murmur, click or rub, no peripheral edema and peripheral pulses strong  ABDOMEN: soft, nontender, no hepatosplenomegaly, no masses and bowel sounds normal  MS: no gross musculoskeletal defects noted, no edema    Diagnostic Test Results:  Labs reviewed in Epic    ASSESSMENT / PLAN:       ICD-10-CM    1. Routine general medical examination at a health care facility  Z00.00       2. Pain in joint, ankle and foot, right  M25.571       3. Mixed hyperlipidemia  E78.2 Lipid panel reflex to direct LDL Fasting     Lipid panel reflex to direct LDL " Fasting      4. Prediabetes  R73.09 Glucose     Glucose          Patient has been advised of split billing requirements and indicates understanding: Yes      COUNSELING:  Reviewed preventive health counseling, as reflected in patient instructions       Regular exercise       Healthy diet/nutrition           She reports that she has never smoked. She has never used smokeless tobacco.      Appropriate preventive services were discussed with this patient, including applicable screening as appropriate for cardiovascular disease, diabetes, osteopenia/osteoporosis, and glaucoma.  As appropriate for age/gender, discussed screening for colorectal cancer, prostate cancer, breast cancer, and cervical cancer. Checklist reviewing preventive services available has been given to the patient.    Reviewed patients plan of care and provided an AVS. The Basic Care Plan (routine screening as documented in Health Maintenance) for Alissa meets the Care Plan requirement. This Care Plan has been established and reviewed with the Patient.      Will send letter with results              VALERIA PAZ MD  [facility]    Identified Health Risks:    I have reviewed Opioid Use Disorder and Substance Use Disorder risk factors and made any needed referrals.

## 2023-06-07 NOTE — PATIENT INSTRUCTIONS
Preventive Health Recommendations    See your health care provider every year to    Review health changes.     Discuss preventive care.      Review your medicines if your doctor has prescribed any.      You no longer need a yearly Pap test unless you've had an abnormal Pap test in the past 10 years. If you have vaginal symptoms, such as bleeding or discharge, be sure to talk with your provider about a Pap test.      Every 1 to 2 years, have a mammogram.  If you are over 69, talk with your health care provider about whether or not you want to continue having screening mammograms.      Every 10 years, have a colonoscopy. Or, have a yearly FIT test (stool test). These exams will check for colon cancer.       Have a cholesterol test every 5 years, or more often if your doctor advises it.       Have a diabetes test (fasting glucose) every three years. If you are at risk for diabetes, you should have this test more often.       At age 65, have a bone density scan (DEXA) to check for osteoporosis (brittle bone disease).    Shots:    Get a flu shot each year.    Get a tetanus shot every 10 years.    Talk to your doctor about your pneumonia vaccines. There are now two you should receive - Pneumovax (PPSV 23) and Prevnar (PCV 13).    Talk to your pharmacist about the shingles vaccine.    Talk to your doctor about the hepatitis B vaccine.    Nutrition:     Eat at least 5 servings of fruits and vegetables each day.      Eat whole-grain bread, whole-wheat pasta and brown rice instead of white grains and rice.      Get adequate about Calcium and Vitamin D.     Lifestyle    Exercise at least 150 minutes a week (30 minutes a day, 5 days a week). This will help you control your weight and prevent disease.      Limit alcohol to one drink per day.      No smoking.       Wear sunscreen to prevent skin cancer.       See your dentist twice a year for an exam and cleaning.      See your eye doctor every 1 to 2 years to screen for  conditions such as glaucoma, macular degeneration, cataracts, etc.    Personalized Prevention Plan  You are due for the preventive services outlined below.  Your care team is available to assist you in scheduling these services.  If you have already completed any of these items, please share that information with your care team to update in your medical record.    There are no preventive care reminders to display for this patient.

## 2023-08-16 ENCOUNTER — ONCOLOGY VISIT (OUTPATIENT)
Dept: ONCOLOGY | Facility: CLINIC | Age: 78
End: 2023-08-16
Attending: NURSE PRACTITIONER
Payer: COMMERCIAL

## 2023-08-16 VITALS
DIASTOLIC BLOOD PRESSURE: 84 MMHG | OXYGEN SATURATION: 100 % | HEART RATE: 96 BPM | WEIGHT: 181 LBS | BODY MASS INDEX: 30.16 KG/M2 | HEIGHT: 65 IN | SYSTOLIC BLOOD PRESSURE: 156 MMHG | RESPIRATION RATE: 16 BRPM

## 2023-08-16 DIAGNOSIS — Z85.3 HX: BREAST CANCER: ICD-10-CM

## 2023-08-16 DIAGNOSIS — M85.89 OTHER SPECIFIED DISORDERS OF BONE DENSITY AND STRUCTURE, MULTIPLE SITES: ICD-10-CM

## 2023-08-16 DIAGNOSIS — M89.9 DISORDER OF BONE AND CARTILAGE: Primary | ICD-10-CM

## 2023-08-16 DIAGNOSIS — Z12.31 ENCOUNTER FOR SCREENING MAMMOGRAM FOR MALIGNANT NEOPLASM OF BREAST: ICD-10-CM

## 2023-08-16 DIAGNOSIS — M94.9 DISORDER OF BONE AND CARTILAGE: Primary | ICD-10-CM

## 2023-08-16 PROCEDURE — 99214 OFFICE O/P EST MOD 30 MIN: CPT | Performed by: NURSE PRACTITIONER

## 2023-08-16 PROCEDURE — G0463 HOSPITAL OUTPT CLINIC VISIT: HCPCS | Performed by: NURSE PRACTITIONER

## 2023-08-16 ASSESSMENT — PAIN SCALES - GENERAL: PAINLEVEL: NO PAIN (0)

## 2023-08-16 NOTE — PROGRESS NOTES
[facility] Hematology and Oncology Progress Note    Patient: Alissa Jansen  MRN: [id number]  Date of Service: Aug 16, 2023         Reason for Visit:    Scheduled f/up    Assessment and Plan:    1. Stage IIA, triple negative, G3, invasive ductal carcinoma UOQ (R) breast ... ~7.5 years s/p (R) lumpectomy, followed by adjuvant chemotherapy, re-resection and breast conservation EBRT.  78 year old   Ms. Alissa Jansen presents alone in scheduled follow-up.  She was hospitalized 08/10-08/11 d/t aspiration during outpatient colonoscopy. Overall she feels that she is now doing well again.  Her peripheral neuropathy symptoms in her feet remain about the same - still has some difficulty with walking d/t a mild foot drop.  However she remains active. Appetite good - weight stable the past couple years.  She denies cough, fever, chills, unusual headaches, visual or mentation disturbance, bowel or bladder issues, rash.  11/27/2023 (B) screening mammogram - ordered.  08/10/22 LFTs - WNL.  Pat is now 7+ years s/p adjuvant therapy  Clinically and radiographically ANANTH.  We again discussed that since she is > 5 years out from completion of treatment, risk of recurrence of the breast cancer is quite low and she could now have follow-up with her PCP.  She continues to want us to follow yearly.    Peripheral neuropathy r/t prior chemotherapy - stable numbness and tingling of tips of her fingers and feet.  No pain.  She has tried acupuncture, lazer, etc.  Unfortunately at this point, it is unlikely the neuropathy will improve much more.    I encouraged her to remain as physically active as she can be.  1-year follow up with yearly mammogram.  No labs.  Updated DEXA scheduled.    2. Bone health:  08/27/2021 DEXA scan - bilateral hip and upper lumbar spine osteopenia.  Continue daily 3957-9232 mg calcium + 0218-9888 international unit(s) vitamin D supplements.    Oncologic History:    1. Stage IIA, triple negative, G3,  invasive ductal carcinoma UOQ (R) breast   01/18/2016 - (R) lumpectomy and sentinel lymph node procedure (Dr. Jerry).  Final pathology showed invasive ductal carcinoma with metaplastic features, grade 3, triple negative, with a 3 mm focus involving the inferior margin.  Final stage was Stage IIA (T2, N0, cM0).  2016, June - completed 4 cycles dd AC chemotherapy followed by 12 weeks of Taxol chemotherapy.  07/06/2016 underwent reresection - no residual cancer seen in pathology.    09/06/2016 - completed breast conservation EBRT.     ECOG Performance:    2 - Ambulatory and independent in all ADLs; cannot work; up > 50% of the time    Pain:    Pain Score: No Pain (0)    Encounter Diagnoses:    Problem List Items Addressed This Visit          Musculoskeletal and Integumentary    Osteopenia - Primary    Relevant Orders    DX Hip/Pelvis/Spine     Other Visit Diagnoses       HX: breast cancer        Other specified disorders of bone density and structure, multiple sites        Relevant Orders    DX Hip/Pelvis/Spine    Encounter for screening mammogram for malignant neoplasm of breast                     31 minutes of time were spent on the date of this encounter with chart review, face to face time with the patient, examination, recommendations, documentation, and communication of the plan of care to the care team.    Reggie Osorio, CNP     CC: Christina Moore MD   ____________________________________________________________________________    Review of Systems:    No fever or night sweats.  No loss of weight.  No lumps or bumps anywhere.  No unusual headaches or eyesight issues.  No dizziness.  No bleeding from the nose.  No sores in the mouth. No problems with swallowing.  No chest pain. No shortness of breath. No cough.  No abdominal pain. No nausea or vomiting.  No diarrhea or constipation.  No blood in stool or black colored stools.  No problems passing urine.  No numbness or tingling in hands or feet.  No skin  "rashes.    A 14 point review of systems is otherwise negative.    Past History:    Past Medical History:   Diagnosis Date    Aspiration pneumonitis (H) 08/10/2022    aspirated during colonoscopy, hypoxia    Closed fracture of lateral malleolus of right ankle 09/02/2004    fell in a hole golfing.    Colon polyps 09/09/2008    2 polyps found on colonoscopy.    Gastric erosions 01/28/2020    Reactive gastropathy.    H/O colonoscopy 01/01/2014    repeat 5 years    H/O colonoscopy 08/10/2022    tubular adenoma and sessile serrated polps as well as hyperplastic, no need to repeat per GI    Hiatal hernia 01/28/2020    Hx antineoplastic chemotherapy 01/01/2016    Right Breast    Hx of radiation therapy 06/01/2016    Right Breast    Hyperlipidemia     Malignant neoplasm of upper-outer quadrant of right breast in female, estrogen receptor negative (H) 12/22/2015    Menopause     1996    Osteoarthritis     Osteopenia of multiple sites 09/2021    repeat bdt 2 yrs       Past Surgical History:   Procedure Laterality Date    BIOPSY BREAST Right 2015    dx breast cancer    COLONOSCOPY W/ POLYPECTOMY  05/16/2019    DENTAL SURGERY      2010    ESOPHAGOSCOPY, GASTROSCOPY, DUODENOSCOPY (EGD), COMBINED  01/28/2020    Large hiatal hernia.  Few stomach erosions.  Biopsy showed reactive gastropathy.  H. pylori negative.    KNEE SURGERY Left 1975    For \"torn ligaments\"    LUMPECTOMY BREAST Right 01/18/2016    Dr. Jerry.    LUMPECTOMY BREAST Right 07/06/2016    Re-excision lumpectomy and port removal, Dr. Jerry    PHACOEMULSIFICATION CLEAR CORNEA WITH STANDARD INTRAOCULAR LENS IMPLANT Bilateral Left 11/19/13, Right 12/3/13    SENTINEL LYMPH NODE BIOPSY Right 01/18/2016    Dr. Jerry.     Physical Exam:    BP (!) 156/84   Pulse 96   Resp 16   Ht 1.651 m (5' 5\")   Wt 82.1 kg (181 lb)   LMP  (LMP Unknown)   SpO2 100%   BMI 30.12 kg/m      GENERAL:   Alert and oriented. Seated comfortably. In no distress.  Very pleasant.     HEENT: "   Atraumatic and normocephalic. BRITNI, EOMI. No pallor. No icterus. No mucosal lesion or tonsillar enlargement.     LYMPH NODES:  No palpable cervical, axillary or inguinal lymphadenopathy.     CHEST:   Lungs clear to auscultation bilaterally.    BREASTS:  (R) excellent cosmesis.  Surgical scar well healed.  Nipple looks normal.  No palpable mass or tenderness.  Axilla is without mass or nodule.  (L) contour, skin and nipple appear normal.  No palpable mass or tenderness.  Axilla is without mass or nodule.     CVS:    S1 and S2 heard. Regular rate and rhythm. No murmur or gallop or rub heard. No peripheral edema.     ABDOMEN:   Soft. Not tender. Not distended.  No palpable hepatomegaly or splenomegaly.     EXTREMITIES: Warm.     SKIN:    No rash, bruising or purpura noted.  Full head of hair.    Lab Results:    No results found for this or any previous visit (from the past 168 hour(s)).    Imaging:    Reviewed personally and with patient.    BILATERAL FULL FIELD DIGITAL SCREENING MAMMOGRAM     Performed on: 11/21/22     Compared to: 11/18/2021, 11/16/2020, 11/13/2019, 11/05/2018, 12/18/2017, 12/14/2016, 12/18/2015, 12/17/2015, 11/26/2013, and 04/20/2011     Technique: This study was evaluated with the assistance of Computer-Aided Detection.     Findings: The breasts have scattered areas of fibroglandular density. There are breast conservation changes in the right breast.   There is no radiographic evidence of malignancy.                                                                    IMPRESSION: ACR BI-RADS Category 2: Benign     RECOMMENDED FOLLOW-UP: Annual routine screening mammogram

## 2023-08-16 NOTE — LETTER
"    8/16/2023         RE: Alissa Jansen  [address] [facility] [id number]        Dear Colleague,    Thank you for referring your patient, Alissa Jansen, to the [facility] [place]. Please see a copy of my visit note below.    Oncology Rooming Note    August 16, 2023 2:21 PM   Alissa Jansen is a 78 year old female who presents for:    Chief Complaint   Patient presents with     Oncology Clinic Visit     Malignant neoplasm of upper-outer quadrant of right breast in female, estrogen receptor negative     Initial Vitals: BP (!) 156/84   Pulse 96   Resp 16   Ht 1.651 m (5' 5\")   Wt 82.1 kg (181 lb)   LMP  (LMP Unknown)   SpO2 100%   BMI 30.12 kg/m   Estimated body mass index is 30.12 kg/m  as calculated from the following:    Height as of this encounter: 1.651 m (5' 5\").    Weight as of this encounter: 82.1 kg (181 lb). Body surface area is 1.94 meters squared.  No Pain (0) Comment: Data Unavailable   No LMP recorded (lmp unknown). Patient is postmenopausal.  Allergies reviewed: Yes  Medications reviewed: Yes    Medications: Medication refills not needed today.  Pharmacy name entered into [employer]: [facility] [address] [place] [address] [address] [place] [address]    Clinical concerns:        Estrella Young              [facility] Hematology and Oncology Progress Note    Patient: Alissa Jansen  MRN: [id number]  Date of Service: Aug 16, 2023         Reason for Visit:    Scheduled f/up    Assessment and Plan:    1. Stage IIA, triple negative, G3, invasive ductal carcinoma UOQ (R) breast ... ~7.5 years s/p (R) lumpectomy, followed by adjuvant chemotherapy, re-resection and breast conservation EBRT.  78 year old   Ms. Alissa Jansen presents alone in scheduled follow-up.  She was hospitalized 08/10-08/11 d/t aspiration during outpatient colonoscopy. Overall she feels that she is now doing well again.  Her peripheral neuropathy symptoms in her feet remain about the " same - still has some difficulty with walking d/t a mild foot drop.  However she remains active. Appetite good - weight stable the past couple years.  She denies cough, fever, chills, unusual headaches, visual or mentation disturbance, bowel or bladder issues, rash.  11/27/2023 (B) screening mammogram - ordered.  08/10/22 LFTs - WNL.  Pat is now 7+ years s/p adjuvant therapy  Clinically and radiographically ANANTH.  We again discussed that since she is > 5 years out from completion of treatment, risk of recurrence of the breast cancer is quite low and she could now have follow-up with her PCP.  She continues to want us to follow yearly.    Peripheral neuropathy r/t prior chemotherapy - stable numbness and tingling of tips of her fingers and feet.  No pain.  She has tried acupuncture, lazer, etc.  Unfortunately at this point, it is unlikely the neuropathy will improve much more.    I encouraged her to remain as physically active as she can be.  1-year follow up with yearly mammogram.  No labs.  Updated DEXA scheduled.    2. Bone health:  08/27/2021 DEXA scan - bilateral hip and upper lumbar spine osteopenia.  Continue daily 7223-6857 mg calcium + 3994-1178 international unit(s) vitamin D supplements.    Oncologic History:    1. Stage IIA, triple negative, G3, invasive ductal carcinoma UOQ (R) breast   01/18/2016 - (R) lumpectomy and sentinel lymph node procedure (Dr. Jerry).  Final pathology showed invasive ductal carcinoma with metaplastic features, grade 3, triple negative, with a 3 mm focus involving the inferior margin.  Final stage was Stage IIA (T2, N0, cM0).  2016, June - completed 4 cycles dd AC chemotherapy followed by 12 weeks of Taxol chemotherapy.  07/06/2016 underwent reresection - no residual cancer seen in pathology.    09/06/2016 - completed breast conservation EBRT.     ECOG Performance:    2 - Ambulatory and independent in all ADLs; cannot work; up > 50% of the time    Pain:    Pain Score: No Pain  (0)    Encounter Diagnoses:    Problem List Items Addressed This Visit          Musculoskeletal and Integumentary    Osteopenia - Primary    Relevant Orders    DX Hip/Pelvis/Spine     Other Visit Diagnoses       HX: breast cancer        Other specified disorders of bone density and structure, multiple sites        Relevant Orders    DX Hip/Pelvis/Spine    Encounter for screening mammogram for malignant neoplasm of breast                     31 minutes of time were spent on the date of this encounter with chart review, face to face time with the patient, examination, recommendations, documentation, and communication of the plan of care to the care team.    Reggie Osorio, CNP     CC: Christina Moore MD   ____________________________________________________________________________    Review of Systems:    No fever or night sweats.  No loss of weight.  No lumps or bumps anywhere.  No unusual headaches or eyesight issues.  No dizziness.  No bleeding from the nose.  No sores in the mouth. No problems with swallowing.  No chest pain. No shortness of breath. No cough.  No abdominal pain. No nausea or vomiting.  No diarrhea or constipation.  No blood in stool or black colored stools.  No problems passing urine.  No numbness or tingling in hands or feet.  No skin rashes.    A 14 point review of systems is otherwise negative.    Past History:    Past Medical History:   Diagnosis Date     Aspiration pneumonitis (H) 08/10/2022    aspirated during colonoscopy, hypoxia     Closed fracture of lateral malleolus of right ankle 09/02/2004    fell in a hole golfing.     Colon polyps 09/09/2008    2 polyps found on colonoscopy.     Gastric erosions 01/28/2020    Reactive gastropathy.     H/O colonoscopy 01/01/2014    repeat 5 years     H/O colonoscopy 08/10/2022    tubular adenoma and sessile serrated polps as well as hyperplastic, no need to repeat per GI     Hiatal hernia 01/28/2020     Hx antineoplastic chemotherapy 01/01/2016     "Right Breast     Hx of radiation therapy 06/01/2016    Right Breast     Hyperlipidemia      Malignant neoplasm of upper-outer quadrant of right breast in female, estrogen receptor negative (H) 12/22/2015     Menopause     1996     Osteoarthritis      Osteopenia of multiple sites 09/2021    repeat bdt 2 yrs       Past Surgical History:   Procedure Laterality Date     BIOPSY BREAST Right 2015    dx breast cancer     COLONOSCOPY W/ POLYPECTOMY  05/16/2019     DENTAL SURGERY      2010     ESOPHAGOSCOPY, GASTROSCOPY, DUODENOSCOPY (EGD), COMBINED  01/28/2020    Large hiatal hernia.  Few stomach erosions.  Biopsy showed reactive gastropathy.  H. pylori negative.     KNEE SURGERY Left 1975    For \"torn ligaments\"     LUMPECTOMY BREAST Right 01/18/2016    Dr. Jerry.     LUMPECTOMY BREAST Right 07/06/2016    Re-excision lumpectomy and port removal, Dr. Jeryr     PHACOEMULSIFICATION CLEAR CORNEA WITH STANDARD INTRAOCULAR LENS IMPLANT Bilateral Left 11/19/13, Right 12/3/13     SENTINEL LYMPH NODE BIOPSY Right 01/18/2016    Dr. Jerry.     Physical Exam:    BP (!) 156/84   Pulse 96   Resp 16   Ht 1.651 m (5' 5\")   Wt 82.1 kg (181 lb)   LMP  (LMP Unknown)   SpO2 100%   BMI 30.12 kg/m      GENERAL:   Alert and oriented. Seated comfortably. In no distress.  Very pleasant.     HEENT:   Atraumatic and normocephalic. BRITNI, EOMI. No pallor. No icterus. No mucosal lesion or tonsillar enlargement.     LYMPH NODES:  No palpable cervical, axillary or inguinal lymphadenopathy.     CHEST:   Lungs clear to auscultation bilaterally.    BREASTS:  (R) excellent cosmesis.  Surgical scar well healed.  Nipple looks normal.  No palpable mass or tenderness.  Axilla is without mass or nodule.  (L) contour, skin and nipple appear normal.  No palpable mass or tenderness.  Axilla is without mass or nodule.     CVS:    S1 and S2 heard. Regular rate and rhythm. No murmur or gallop or rub heard. No peripheral edema.     ABDOMEN:   Soft. Not tender. " Not distended.  No palpable hepatomegaly or splenomegaly.     EXTREMITIES: Warm.     SKIN:    No rash, bruising or purpura noted.  Full head of hair.    Lab Results:    No results found for this or any previous visit (from the past 168 hour(s)).    Imaging:    Reviewed personally and with patient.    BILATERAL FULL FIELD DIGITAL SCREENING MAMMOGRAM     Performed on: 11/21/22     Compared to: 11/18/2021, 11/16/2020, 11/13/2019, 11/05/2018, 12/18/2017, 12/14/2016, 12/18/2015, 12/17/2015, 11/26/2013, and 04/20/2011     Technique: This study was evaluated with the assistance of Computer-Aided Detection.     Findings: The breasts have scattered areas of fibroglandular density. There are breast conservation changes in the right breast.   There is no radiographic evidence of malignancy.                                                                    IMPRESSION: ACR BI-RADS Category 2: Benign     RECOMMENDED FOLLOW-UP: Annual routine screening mammogram      Again, thank you for allowing me to participate in the care of your patient.        Sincerely,        Reggie Osorio, CNP

## 2023-08-16 NOTE — PROGRESS NOTES
"Oncology Rooming Note    August 16, 2023 2:21 PM   Alissa Jansen is a 78 year old female who presents for:    Chief Complaint   Patient presents with    Oncology Clinic Visit     Malignant neoplasm of upper-outer quadrant of right breast in female, estrogen receptor negative     Initial Vitals: BP (!) 156/84   Pulse 96   Resp 16   Ht 1.651 m (5' 5\")   Wt 82.1 kg (181 lb)   LMP  (LMP Unknown)   SpO2 100%   BMI 30.12 kg/m   Estimated body mass index is 30.12 kg/m  as calculated from the following:    Height as of this encounter: 1.651 m (5' 5\").    Weight as of this encounter: 82.1 kg (181 lb). Body surface area is 1.94 meters squared.  No Pain (0) Comment: Data Unavailable   No LMP recorded (lmp unknown). Patient is postmenopausal.  Allergies reviewed: Yes  Medications reviewed: Yes    Medications: Medication refills not needed today.  Pharmacy name entered into [facility]: [facility] [address] [place] [address] [id number] [facility]    Clinical concerns:        Estrella Young            "

## 2023-09-01 ENCOUNTER — ANCILLARY PROCEDURE (OUTPATIENT)
Dept: BONE DENSITY | Facility: CLINIC | Age: 78
End: 2023-09-01
Attending: NURSE PRACTITIONER
Payer: COMMERCIAL

## 2023-09-01 DIAGNOSIS — M94.9 DISORDER OF BONE AND CARTILAGE: ICD-10-CM

## 2023-09-01 DIAGNOSIS — M89.9 DISORDER OF BONE AND CARTILAGE: ICD-10-CM

## 2023-09-01 DIAGNOSIS — M85.89 OTHER SPECIFIED DISORDERS OF BONE DENSITY AND STRUCTURE, MULTIPLE SITES: ICD-10-CM

## 2023-09-01 PROCEDURE — 77080 DXA BONE DENSITY AXIAL: CPT | Mod: TC | Performed by: PHYSICIAN ASSISTANT

## 2023-09-07 ENCOUNTER — PATIENT OUTREACH (OUTPATIENT)
Dept: ONCOLOGY | Facility: CLINIC | Age: 78
End: 2023-09-07
Payer: COMMERCIAL

## 2023-09-08 DIAGNOSIS — Z17.1 MALIGNANT NEOPLASM OF UPPER-OUTER QUADRANT OF RIGHT BREAST IN FEMALE, ESTROGEN RECEPTOR NEGATIVE (H): ICD-10-CM

## 2023-09-08 DIAGNOSIS — C50.411 MALIGNANT NEOPLASM OF UPPER-OUTER QUADRANT OF RIGHT BREAST IN FEMALE, ESTROGEN RECEPTOR NEGATIVE (H): ICD-10-CM

## 2023-09-08 DIAGNOSIS — R53.1 WEAKNESS GENERALIZED: ICD-10-CM

## 2023-09-08 DIAGNOSIS — Z85.3 HX: BREAST CANCER: Primary | ICD-10-CM

## 2023-09-14 ENCOUNTER — THERAPY VISIT (OUTPATIENT)
Dept: PHYSICAL THERAPY | Facility: REHABILITATION | Age: 78
End: 2023-09-14
Attending: NURSE PRACTITIONER
Payer: COMMERCIAL

## 2023-09-14 DIAGNOSIS — R68.89 DECREASED FUNCTIONAL ACTIVITY TOLERANCE: Primary | ICD-10-CM

## 2023-09-14 DIAGNOSIS — R53.1 WEAKNESS GENERALIZED: ICD-10-CM

## 2023-09-14 DIAGNOSIS — C50.411 MALIGNANT NEOPLASM OF UPPER-OUTER QUADRANT OF RIGHT BREAST IN FEMALE, ESTROGEN RECEPTOR NEGATIVE (H): ICD-10-CM

## 2023-09-14 DIAGNOSIS — Z17.1 MALIGNANT NEOPLASM OF UPPER-OUTER QUADRANT OF RIGHT BREAST IN FEMALE, ESTROGEN RECEPTOR NEGATIVE (H): ICD-10-CM

## 2023-09-14 DIAGNOSIS — Z85.3 HX: BREAST CANCER: ICD-10-CM

## 2023-09-14 PROCEDURE — 97110 THERAPEUTIC EXERCISES: CPT | Mod: GP | Performed by: PHYSICAL THERAPIST

## 2023-09-14 PROCEDURE — 97161 PT EVAL LOW COMPLEX 20 MIN: CPT | Mod: GP | Performed by: PHYSICAL THERAPIST

## 2023-09-14 ASSESSMENT — 10 METER WALK TEST (10METWT)
AVERAGE VELOCITY - METERS PER SECOND: 1.02
AVERAGE TIME - SECONDS: 0.82

## 2023-09-14 ASSESSMENT — 6 MINUTE WALK TEST (6MWT)
TOTAL DISTANCE WALKED (METERS): 319
OXYGEN DEVICE: NO

## 2023-09-14 NOTE — PROGRESS NOTES
PHYSICAL THERAPY EVALUATION  Type of Visit: Evaluation    See electronic medical record for Abuse and Falls Screening details.    Patient reports she had breast cancer in 2016; lumpectomy, chemotherapy and then radiation. Surgery was Jan 8th and finished treatments end of August she was completed with treatment. She developed neuropathy with only 3 chemotherapy treatments left. She has tried to treat the neuropathy with lasers, acupuncture, e-stim and other treatments. And nothing has been helpful.   She reports she has very careful and has not had a fall and will use a cane when needed. She has a trip to [place] with family and she would like to be able to walk better and more endurance for this (January 2024) Went last year and used wheelchair at the time       Subjective       Presenting condition or subjective complaint: Want to build strength in my legs so. Can walk better  Date of onset: 09/08/23    Relevant medical history:     Dates & types of surgery: Breast cancer Jan 8 2016    Prior diagnostic imaging/testing results:       Prior therapy history for the same diagnosis, illness or injury: No      Prior Level of Function  Transfers: Independent  Ambulation: Independent  ADL: Independent      Living Environment  Social support: Alone   Type of home: [facility]   Stairs to enter the home: No      not from the garage where she ususally come in   Ramp: No   Stairs inside the home: Yes 6 Is there a railing: Yes   Help at home: None  Equipment owned: Straight Cane     Employment: No    Hobbies/Interests: stamping, sports - baseball, football, watching games and watching kids and grandkids. (1 daughter; 2 grandsons)     Patient goals for therapy: Yes - to walk better     Pain assessment: Pain denied     Objective  - Cancer Rehab   Cognitive Status Examination  Orientation: Oriented to person, place and time   Level of Consciousness: Alert  Follows Commands and Answers Questions: 100% of the time  Personal Safety and  "Judgement: Intact  Memory: Intact    GAIT:   Level of [place]: Independent  Assistive Device(s): None  Gait Deviations: Base of support increased  Stride length decreased  Sari decreased  Drop foot R  Tan steppage gait noted on right side   Gait Distance: see 6 minute walk test   Stairs: reciprocal pattern with hand rail       SPECIAL TESTS  Functional Gait Assessment (FGA) TOTAL SCORE: (MAXIMUM SCORE 30): 16 falls risk increased less than 23   10 Meter Walk Test (Comfortable) 10 Meter Walk Test- comfortable (m/s): 0.82 m/s (7.31 seconds)   10 Meter Walk Test (Fast) 10 Meter Walk Test - fast (m/s): 1.02 m/s (5.91 seconds)   6 Minute Walk Test (6MWT)     6 minute walk test - device used?: No  Did not require standing rest break, Fatigue/OMNI Effort Scale: 6-7/10, tripped on right toe x 2 needing wall to correct balance more so on first one than the second one    [place] Balance Scale (BBS)     5 Times Sit-to-Stand (5TSTS) 5 times Sit to Stand (sec): 11.9     Dynamic Gait Index (DGI)     Timed Up and Go (TUG) - sec Average 11.3 seconds; Trial 1 12.31\" Trial 2 - 10.34\"   Tug Cognitive: 15.5 sec; Task count down from 100 by 3's (100  97, 96, 93)    30 Second Sit to Stand (reps/height) 13 reps            SENSATION:  diminished to bilateral feet and toes     Assessment & Plan   CLINICAL IMPRESSIONS  Medical Diagnosis: HX: breast cancer  C50.411, Z17.1 (ICD-10-CM) - Malignant neoplasm of upper-outer quadrant of right breast in female, estrogen receptor negative (H)  R53.1 (ICD-10-CM) - Weakness generalized    Treatment Diagnosis: Decreased functional activity tolerance; generalized muscle weakness   Impression/Assessment: Patient is a 78 year old female with complaints decreased strength endurance and balance following hx of breast cancer with chemotherapy and radiation leading to long standing neuropathy. She has noted foot drop on the right side.   The following significant findings have been identified: Decreased " strength, Impaired balance, Decreased proprioception, Impaired sensation, Impaired gait, Impaired muscle performance, and Decreased activity tolerance. These impairments interfere with their ability to perform recreational activities, household chores, household mobility, and community mobility as compared to previous level of function.     Clinical Decision Making (Complexity):  Clinical Presentation: Stable/Uncomplicated  Clinical Presentation Rationale: based on medical and personal factors listed in PT evaluation  Clinical Decision Making (Complexity): Low complexity    PLAN OF CARE  Treatment Interventions:  Interventions: Gait Training, Manual Therapy, Neuromuscular Re-education, Therapeutic Activity, Therapeutic Exercise, Self-Care/Home Management    Long Term Goals     PT Goal 1  Goal Identifier: HEP  Goal Description: Patient will be independent in a HEP for ongoing symptom management in 12 weeks  Target Date: 12/07/23  PT Goal 2  Goal Identifier: FGA  Goal Description: Patient will increase her FGA score to 20/30 or more for increase in balance and decrease risk for falls in 12 weeks  Target Date: 12/07/23  PT Goal 3  Goal Identifier: Gait Speed  Goal Description: Patient will increase her comfortable gait speed to >1.0 m/s for increase ease in community ambulation and decrease risk for falls in 12 weeks  Target Date: 12/07/23      Frequency of Treatment: 1 time per week decreased to every 2-3 weeks as needed  Duration of Treatment: 12 weeks    Recommended Referrals to Other Professionals:  orthotics for AFO for foot drop possibly   Education Assessment:   Learner/Method: Patient;Listening;Reading;Demonstration;Pictures/Video;No Barriers to Learning    Risks and benefits of evaluation/treatment have been explained.   Patient/Family/caregiver agrees with Plan of Care.     Evaluation Time:     PT Eval, Low Complexity Minutes (88209): 45   Present: Not applicable     Signing Clinician: Raya  Miguel, PT      [facility]                                                                                   OUTPATIENT PHYSICAL THERAPY      PLAN OF TREATMENT FOR OUTPATIENT REHABILITATION   Patient's Last Name, First Name, Alissa Murphy YOB: 1945   Provider's Name   [facility]   Medical Record No.  [id number]     Onset Date: 09/08/23  Start of Care Date: 09/14/23     Medical Diagnosis:  HX: breast cancer  C50.411, Z17.1 (ICD-10-CM) - Malignant neoplasm of upper-outer quadrant of right breast in female, estrogen receptor negative (H)  R53.1 (ICD-10-CM) - Weakness generalized      PT Treatment Diagnosis:  Decreased functional activity tolerance; generalized muscle weakness Plan of Treatment  Frequency/Duration: 1 time per week decreased to every 2-3 weeks as needed/ 12 weeks    Certification date from 09/14/23 to 12/07/23         See note for plan of treatment details and functional goals     Raya Rivera, PT                         I CERTIFY THE NEED FOR THESE SERVICES FURNISHED UNDER        THIS PLAN OF TREATMENT AND WHILE UNDER MY CARE     (Physician attestation of this document indicates review and certification of the therapy plan).                Referring Provider:  Reggie Osroio      Initial Assessment  See Epic Evaluation- Start of Care Date: 09/14/23

## 2023-09-28 ENCOUNTER — THERAPY VISIT (OUTPATIENT)
Dept: PHYSICAL THERAPY | Facility: REHABILITATION | Age: 78
End: 2023-09-28
Attending: NURSE PRACTITIONER
Payer: COMMERCIAL

## 2023-09-28 DIAGNOSIS — Z17.1 MALIGNANT NEOPLASM OF UPPER-OUTER QUADRANT OF RIGHT BREAST IN FEMALE, ESTROGEN RECEPTOR NEGATIVE (H): ICD-10-CM

## 2023-09-28 DIAGNOSIS — R68.89 DECREASED FUNCTIONAL ACTIVITY TOLERANCE: ICD-10-CM

## 2023-09-28 DIAGNOSIS — R53.1 WEAKNESS GENERALIZED: ICD-10-CM

## 2023-09-28 DIAGNOSIS — C50.411 MALIGNANT NEOPLASM OF UPPER-OUTER QUADRANT OF RIGHT BREAST IN FEMALE, ESTROGEN RECEPTOR NEGATIVE (H): ICD-10-CM

## 2023-09-28 DIAGNOSIS — Z85.3 HX: BREAST CANCER: Primary | ICD-10-CM

## 2023-09-28 PROCEDURE — 97112 NEUROMUSCULAR REEDUCATION: CPT | Mod: GP | Performed by: PHYSICAL THERAPIST

## 2023-09-28 PROCEDURE — 97110 THERAPEUTIC EXERCISES: CPT | Mod: GP | Performed by: PHYSICAL THERAPIST

## 2023-10-10 ENCOUNTER — THERAPY VISIT (OUTPATIENT)
Dept: PHYSICAL THERAPY | Facility: REHABILITATION | Age: 78
End: 2023-10-10
Payer: COMMERCIAL

## 2023-10-10 DIAGNOSIS — M62.81 GENERALIZED MUSCLE WEAKNESS: Primary | ICD-10-CM

## 2023-10-10 DIAGNOSIS — C50.411 MALIGNANT NEOPLASM OF UPPER-OUTER QUADRANT OF RIGHT BREAST IN FEMALE, ESTROGEN RECEPTOR NEGATIVE (H): ICD-10-CM

## 2023-10-10 DIAGNOSIS — R68.89 DECREASED FUNCTIONAL ACTIVITY TOLERANCE: ICD-10-CM

## 2023-10-10 DIAGNOSIS — R53.1 WEAKNESS GENERALIZED: ICD-10-CM

## 2023-10-10 DIAGNOSIS — Z17.1 MALIGNANT NEOPLASM OF UPPER-OUTER QUADRANT OF RIGHT BREAST IN FEMALE, ESTROGEN RECEPTOR NEGATIVE (H): ICD-10-CM

## 2023-10-10 DIAGNOSIS — Z85.3 HX: BREAST CANCER: ICD-10-CM

## 2023-10-10 PROCEDURE — 97112 NEUROMUSCULAR REEDUCATION: CPT | Mod: GP | Performed by: PHYSICAL THERAPY ASSISTANT

## 2023-10-10 PROCEDURE — 97110 THERAPEUTIC EXERCISES: CPT | Mod: GP | Performed by: PHYSICAL THERAPY ASSISTANT

## 2023-10-17 ENCOUNTER — THERAPY VISIT (OUTPATIENT)
Dept: PHYSICAL THERAPY | Facility: REHABILITATION | Age: 78
End: 2023-10-17
Payer: COMMERCIAL

## 2023-10-17 DIAGNOSIS — R53.1 WEAKNESS GENERALIZED: ICD-10-CM

## 2023-10-17 DIAGNOSIS — Z85.3 HX: BREAST CANCER: ICD-10-CM

## 2023-10-17 DIAGNOSIS — Z17.1 MALIGNANT NEOPLASM OF UPPER-OUTER QUADRANT OF RIGHT BREAST IN FEMALE, ESTROGEN RECEPTOR NEGATIVE (H): ICD-10-CM

## 2023-10-17 DIAGNOSIS — C50.411 MALIGNANT NEOPLASM OF UPPER-OUTER QUADRANT OF RIGHT BREAST IN FEMALE, ESTROGEN RECEPTOR NEGATIVE (H): ICD-10-CM

## 2023-10-17 DIAGNOSIS — M62.81 GENERALIZED MUSCLE WEAKNESS: Primary | ICD-10-CM

## 2023-10-17 DIAGNOSIS — R68.89 DECREASED FUNCTIONAL ACTIVITY TOLERANCE: ICD-10-CM

## 2023-10-17 PROCEDURE — 97110 THERAPEUTIC EXERCISES: CPT | Mod: CQ | Performed by: PHYSICAL THERAPY ASSISTANT

## 2023-10-17 PROCEDURE — 97112 NEUROMUSCULAR REEDUCATION: CPT | Mod: CQ | Performed by: PHYSICAL THERAPY ASSISTANT

## 2023-10-24 ENCOUNTER — THERAPY VISIT (OUTPATIENT)
Dept: PHYSICAL THERAPY | Facility: REHABILITATION | Age: 78
End: 2023-10-24
Payer: COMMERCIAL

## 2023-10-24 DIAGNOSIS — M62.81 GENERALIZED MUSCLE WEAKNESS: Primary | ICD-10-CM

## 2023-10-24 DIAGNOSIS — R68.89 DECREASED FUNCTIONAL ACTIVITY TOLERANCE: ICD-10-CM

## 2023-10-24 DIAGNOSIS — R53.1 WEAKNESS GENERALIZED: ICD-10-CM

## 2023-10-24 DIAGNOSIS — Z85.3 HX: BREAST CANCER: ICD-10-CM

## 2023-10-24 DIAGNOSIS — C50.411 MALIGNANT NEOPLASM OF UPPER-OUTER QUADRANT OF RIGHT BREAST IN FEMALE, ESTROGEN RECEPTOR NEGATIVE (H): ICD-10-CM

## 2023-10-24 DIAGNOSIS — Z17.1 MALIGNANT NEOPLASM OF UPPER-OUTER QUADRANT OF RIGHT BREAST IN FEMALE, ESTROGEN RECEPTOR NEGATIVE (H): ICD-10-CM

## 2023-10-24 PROCEDURE — 97110 THERAPEUTIC EXERCISES: CPT | Mod: GP | Performed by: PHYSICAL THERAPIST

## 2023-10-24 PROCEDURE — 97112 NEUROMUSCULAR REEDUCATION: CPT | Mod: GP | Performed by: PHYSICAL THERAPIST

## 2023-10-31 ENCOUNTER — THERAPY VISIT (OUTPATIENT)
Dept: PHYSICAL THERAPY | Facility: REHABILITATION | Age: 78
End: 2023-10-31
Payer: COMMERCIAL

## 2023-10-31 DIAGNOSIS — Z85.3 HX: BREAST CANCER: ICD-10-CM

## 2023-10-31 DIAGNOSIS — C50.411 MALIGNANT NEOPLASM OF UPPER-OUTER QUADRANT OF RIGHT BREAST IN FEMALE, ESTROGEN RECEPTOR NEGATIVE (H): ICD-10-CM

## 2023-10-31 DIAGNOSIS — R53.1 WEAKNESS GENERALIZED: ICD-10-CM

## 2023-10-31 DIAGNOSIS — R68.89 DECREASED FUNCTIONAL ACTIVITY TOLERANCE: ICD-10-CM

## 2023-10-31 DIAGNOSIS — Z17.1 MALIGNANT NEOPLASM OF UPPER-OUTER QUADRANT OF RIGHT BREAST IN FEMALE, ESTROGEN RECEPTOR NEGATIVE (H): ICD-10-CM

## 2023-10-31 DIAGNOSIS — M62.81 GENERALIZED MUSCLE WEAKNESS: Primary | ICD-10-CM

## 2023-10-31 PROCEDURE — 97112 NEUROMUSCULAR REEDUCATION: CPT | Mod: GP | Performed by: PHYSICAL THERAPY ASSISTANT

## 2023-11-08 ENCOUNTER — THERAPY VISIT (OUTPATIENT)
Dept: PHYSICAL THERAPY | Facility: REHABILITATION | Age: 78
End: 2023-11-08
Payer: COMMERCIAL

## 2023-11-08 DIAGNOSIS — C50.411 MALIGNANT NEOPLASM OF UPPER-OUTER QUADRANT OF RIGHT BREAST IN FEMALE, ESTROGEN RECEPTOR NEGATIVE (H): ICD-10-CM

## 2023-11-08 DIAGNOSIS — R68.89 DECREASED FUNCTIONAL ACTIVITY TOLERANCE: ICD-10-CM

## 2023-11-08 DIAGNOSIS — Z85.3 HX: BREAST CANCER: ICD-10-CM

## 2023-11-08 DIAGNOSIS — Z17.1 MALIGNANT NEOPLASM OF UPPER-OUTER QUADRANT OF RIGHT BREAST IN FEMALE, ESTROGEN RECEPTOR NEGATIVE (H): ICD-10-CM

## 2023-11-08 DIAGNOSIS — M62.81 GENERALIZED MUSCLE WEAKNESS: Primary | ICD-10-CM

## 2023-11-08 PROCEDURE — 97112 NEUROMUSCULAR REEDUCATION: CPT | Mod: GP | Performed by: PHYSICAL THERAPIST

## 2023-11-08 PROCEDURE — 97110 THERAPEUTIC EXERCISES: CPT | Mod: GP | Performed by: PHYSICAL THERAPIST

## 2023-11-15 ENCOUNTER — THERAPY VISIT (OUTPATIENT)
Dept: PHYSICAL THERAPY | Facility: REHABILITATION | Age: 78
End: 2023-11-15
Payer: COMMERCIAL

## 2023-11-15 DIAGNOSIS — C50.411 MALIGNANT NEOPLASM OF UPPER-OUTER QUADRANT OF RIGHT BREAST IN FEMALE, ESTROGEN RECEPTOR NEGATIVE (H): ICD-10-CM

## 2023-11-15 DIAGNOSIS — R53.1 WEAKNESS GENERALIZED: ICD-10-CM

## 2023-11-15 DIAGNOSIS — M62.81 GENERALIZED MUSCLE WEAKNESS: Primary | ICD-10-CM

## 2023-11-15 DIAGNOSIS — Z17.1 MALIGNANT NEOPLASM OF UPPER-OUTER QUADRANT OF RIGHT BREAST IN FEMALE, ESTROGEN RECEPTOR NEGATIVE (H): ICD-10-CM

## 2023-11-15 DIAGNOSIS — R68.89 DECREASED FUNCTIONAL ACTIVITY TOLERANCE: ICD-10-CM

## 2023-11-15 DIAGNOSIS — Z85.3 HX: BREAST CANCER: ICD-10-CM

## 2023-11-15 PROCEDURE — 97110 THERAPEUTIC EXERCISES: CPT | Mod: GP | Performed by: PHYSICAL THERAPY ASSISTANT

## 2023-11-15 PROCEDURE — 97112 NEUROMUSCULAR REEDUCATION: CPT | Mod: GP | Performed by: PHYSICAL THERAPY ASSISTANT

## 2023-11-22 ENCOUNTER — THERAPY VISIT (OUTPATIENT)
Dept: PHYSICAL THERAPY | Facility: REHABILITATION | Age: 78
End: 2023-11-22
Payer: COMMERCIAL

## 2023-11-22 DIAGNOSIS — R53.1 WEAKNESS GENERALIZED: ICD-10-CM

## 2023-11-22 DIAGNOSIS — R68.89 DECREASED FUNCTIONAL ACTIVITY TOLERANCE: ICD-10-CM

## 2023-11-22 DIAGNOSIS — Z17.1 MALIGNANT NEOPLASM OF UPPER-OUTER QUADRANT OF RIGHT BREAST IN FEMALE, ESTROGEN RECEPTOR NEGATIVE (H): ICD-10-CM

## 2023-11-22 DIAGNOSIS — C50.411 MALIGNANT NEOPLASM OF UPPER-OUTER QUADRANT OF RIGHT BREAST IN FEMALE, ESTROGEN RECEPTOR NEGATIVE (H): ICD-10-CM

## 2023-11-22 DIAGNOSIS — Z85.3 HX: BREAST CANCER: ICD-10-CM

## 2023-11-22 DIAGNOSIS — M62.81 GENERALIZED MUSCLE WEAKNESS: Primary | ICD-10-CM

## 2023-11-22 PROCEDURE — 97112 NEUROMUSCULAR REEDUCATION: CPT | Mod: GP | Performed by: PHYSICAL THERAPY ASSISTANT

## 2023-11-22 PROCEDURE — 97110 THERAPEUTIC EXERCISES: CPT | Mod: GP | Performed by: PHYSICAL THERAPY ASSISTANT

## 2023-11-27 ENCOUNTER — HOSPITAL ENCOUNTER (OUTPATIENT)
Dept: MAMMOGRAPHY | Facility: CLINIC | Age: 78
Discharge: HOME | End: 2023-11-27
Attending: NURSE PRACTITIONER | Admitting: NURSE PRACTITIONER
Payer: COMMERCIAL

## 2023-11-27 DIAGNOSIS — Z12.31 VISIT FOR SCREENING MAMMOGRAM: ICD-10-CM

## 2023-11-27 PROCEDURE — 77067 SCR MAMMO BI INCL CAD: CPT

## 2023-11-29 ENCOUNTER — THERAPY VISIT (OUTPATIENT)
Dept: PHYSICAL THERAPY | Facility: REHABILITATION | Age: 78
End: 2023-11-29
Payer: COMMERCIAL

## 2023-11-29 DIAGNOSIS — R53.1 WEAKNESS GENERALIZED: ICD-10-CM

## 2023-11-29 DIAGNOSIS — Z85.3 HX: BREAST CANCER: ICD-10-CM

## 2023-11-29 DIAGNOSIS — C50.411 MALIGNANT NEOPLASM OF UPPER-OUTER QUADRANT OF RIGHT BREAST IN FEMALE, ESTROGEN RECEPTOR NEGATIVE (H): ICD-10-CM

## 2023-11-29 DIAGNOSIS — Z17.1 MALIGNANT NEOPLASM OF UPPER-OUTER QUADRANT OF RIGHT BREAST IN FEMALE, ESTROGEN RECEPTOR NEGATIVE (H): ICD-10-CM

## 2023-11-29 DIAGNOSIS — R68.89 DECREASED FUNCTIONAL ACTIVITY TOLERANCE: ICD-10-CM

## 2023-11-29 DIAGNOSIS — M62.81 GENERALIZED MUSCLE WEAKNESS: Primary | ICD-10-CM

## 2023-11-29 PROCEDURE — 97112 NEUROMUSCULAR REEDUCATION: CPT | Mod: CQ | Performed by: PHYSICAL THERAPY ASSISTANT

## 2023-11-29 PROCEDURE — 97110 THERAPEUTIC EXERCISES: CPT | Mod: CQ | Performed by: PHYSICAL THERAPY ASSISTANT

## 2023-12-06 ENCOUNTER — THERAPY VISIT (OUTPATIENT)
Dept: PHYSICAL THERAPY | Facility: REHABILITATION | Age: 78
End: 2023-12-06
Payer: COMMERCIAL

## 2023-12-06 DIAGNOSIS — Z85.3 HX: BREAST CANCER: ICD-10-CM

## 2023-12-06 DIAGNOSIS — Z17.1 MALIGNANT NEOPLASM OF UPPER-OUTER QUADRANT OF RIGHT BREAST IN FEMALE, ESTROGEN RECEPTOR NEGATIVE (H): ICD-10-CM

## 2023-12-06 DIAGNOSIS — M62.81 GENERALIZED MUSCLE WEAKNESS: Primary | ICD-10-CM

## 2023-12-06 DIAGNOSIS — R68.89 DECREASED FUNCTIONAL ACTIVITY TOLERANCE: ICD-10-CM

## 2023-12-06 DIAGNOSIS — C50.411 MALIGNANT NEOPLASM OF UPPER-OUTER QUADRANT OF RIGHT BREAST IN FEMALE, ESTROGEN RECEPTOR NEGATIVE (H): ICD-10-CM

## 2023-12-06 DIAGNOSIS — R53.1 WEAKNESS GENERALIZED: ICD-10-CM

## 2023-12-06 PROCEDURE — 97110 THERAPEUTIC EXERCISES: CPT | Mod: GP | Performed by: PHYSICAL THERAPIST

## 2023-12-06 ASSESSMENT — 10 METER WALK TEST (10METWT)
AVERAGE VELOCITY - METERS PER SECOND: 1.04
AVERAGE TIME - SECONDS: 0.9

## 2023-12-06 ASSESSMENT — 6 MINUTE WALK TEST (6MWT)
OXYGEN DEVICE: NO
TOTAL DISTANCE WALKED (METERS): 369.2

## 2023-12-06 NOTE — PROGRESS NOTES
DISCHARGE  Reason for Discharge: Patient has met all goals.  Patient chooses to discontinue therapy.    Equipment Issued: none    Discharge Plan: Patient to continue home program.    Referring Provider:  Reggie Osorio     12/06/23 0500   Appointment Info   Signing clinician's name / credentials Raya Miguel PT, DPT, CLT-JOSIAH   Visits Used 11   Medical Diagnosis HX: breast cancer  C50.411, Z17.1 (ICD-10-CM) - Malignant neoplasm of upper-outer quadrant of right breast in female, estrogen receptor negative (H)  R53.1 (ICD-10-CM) - Weakness generalized   PT Tx Diagnosis Decreased functional activity tolerance; generalized muscle weakness   Quick Adds Certification;Self Referred   Progress Note/Certification   Start of Care Date 09/14/23   Onset of illness/injury or Date of Surgery 09/08/23   Therapy Frequency 1 time per week decreased to every 2-3 weeks as needed   Predicted Duration 12 weeks   Certification date from 09/14/23   Certification date to 12/07/23   Progress Note Completed Date 09/14/23   Supervision   Assistant Supervision PTA visit observed, intervention appropriate, plan of care reviewed and remains appropriate   PT Assistant Visit Number 6       Present No   GOALS   PT Goals 2;3   PT Goal 1   Goal Identifier HEP   Goal Description Patient will be independent in a HEP for ongoing symptom management in 12 weeks   Goal Progress Goal Met   Target Date 12/07/23   Date Met 12/06/23   PT Goal 2   Goal Identifier FGA   Goal Description Patient will increase her FGA score to 20/30 or more for increase in balance and decrease risk for falls in 12 weeks   Goal Progress Progressing, increased to 18/20 today   Target Date 12/07/23   PT Goal 3   Goal Identifier Gait Speed   Goal Description Patient will increase her comfortable gait speed to >1.0 m/s for increase ease in community ambulation and decrease risk for falls in 12 weeks   Goal Progress Progressing, increased to 0.9 m/s today    Target Date 12/07/23   Subjective Report   Subjective Report Better on her balance, she is feeling better with her standing, more endurance and strength. She was able to go to [employer], and 3-4 other store and gas for her car after going to [employer]. She feels ready to DC today with I HEP   Objective Measures   Objective Measures Objective Measure 1;Objective Measure 2;Objective Measure 3   Objective Measure 1   Objective Measure 6 minute walk test   Details increased to 369m from 319 at initial and improved RPE   Objective Measure 2   Objective Measure FGA   Details increased to 18 from 16   Objective Measure 3   Objective Measure Gait speed   Details increased to 0.9m/s from 0.82 m/s   Treatment Interventions (PT)   Interventions Therapeutic Procedure/Exercise;Neuromuscular Re-education   Therapeutic Procedure/Exercise   Therapeutic Procedures: strength, endurance, ROM, flexibillity minutes (95700) 55   Ther Proc 1 For LE strengthening, endurance and axial rotation   Ther Proc 1 - Details Nustep WL 5 x 12min, 69 spm on average and  total steps 842   Ther Proc 2 For LE strengthening, hip and core stability   Ther Proc 2 - Details reviewed HEP from previous session; testing as above.   Patient Response/Progress tolerated well,needs verbal cues to perform ex's slowly and with correct form   Education   Learner/Method Patient;Listening;Reading;Demonstration;Pictures/Video;No Barriers to Learning   Plan   Plan for next session NuStep, LE and core strengthening, dynamic and static balance, work on gait mechanics for foot drop on right side, ankle and instrinsic foot strengthening   Comments   Comments Patient returns for follow up today. Pt continues to need 1HH on railing with higher level dynamic balance drills.  This is a light touch on the railing. Pt is progressing towards initial goals and should be ready to DC next visit. Patient remains appropriate for skilled therapy   Total Session Time   Timed Code Treatment  Minutes 55   Total Treatment Time (sum of timed and untimed services) 55

## 2024-05-01 ENCOUNTER — PATIENT OUTREACH (OUTPATIENT)
Dept: CARE COORDINATION | Facility: CLINIC | Age: 79
End: 2024-05-01
Payer: COMMERCIAL

## 2024-05-15 ENCOUNTER — PATIENT OUTREACH (OUTPATIENT)
Dept: CARE COORDINATION | Facility: CLINIC | Age: 79
End: 2024-05-15
Payer: COMMERCIAL

## 2024-06-05 SDOH — HEALTH STABILITY: PHYSICAL HEALTH: ON AVERAGE, HOW MANY DAYS PER WEEK DO YOU ENGAGE IN MODERATE TO STRENUOUS EXERCISE (LIKE A BRISK WALK)?: 3 DAYS

## 2024-06-05 SDOH — HEALTH STABILITY: PHYSICAL HEALTH: ON AVERAGE, HOW MANY MINUTES DO YOU ENGAGE IN EXERCISE AT THIS LEVEL?: 20 MIN

## 2024-06-05 ASSESSMENT — ANXIETY QUESTIONNAIRES
2. NOT BEING ABLE TO STOP OR CONTROL WORRYING: NOT AT ALL
4. TROUBLE RELAXING: NOT AT ALL
1. FEELING NERVOUS, ANXIOUS, OR ON EDGE: NOT AT ALL
6. BECOMING EASILY ANNOYED OR IRRITABLE: NOT AT ALL
GAD7 TOTAL SCORE: 0
8. IF YOU CHECKED OFF ANY PROBLEMS, HOW DIFFICULT HAVE THESE MADE IT FOR YOU TO DO YOUR WORK, TAKE CARE OF THINGS AT HOME, OR GET ALONG WITH OTHER PEOPLE?: NOT DIFFICULT AT ALL
3. WORRYING TOO MUCH ABOUT DIFFERENT THINGS: NOT AT ALL
IF YOU CHECKED OFF ANY PROBLEMS ON THIS QUESTIONNAIRE, HOW DIFFICULT HAVE THESE PROBLEMS MADE IT FOR YOU TO DO YOUR WORK, TAKE CARE OF THINGS AT HOME, OR GET ALONG WITH OTHER PEOPLE: NOT DIFFICULT AT ALL
GAD7 TOTAL SCORE: 0
7. FEELING AFRAID AS IF SOMETHING AWFUL MIGHT HAPPEN: NOT AT ALL
5. BEING SO RESTLESS THAT IT IS HARD TO SIT STILL: NOT AT ALL
GAD7 TOTAL SCORE: 0
7. FEELING AFRAID AS IF SOMETHING AWFUL MIGHT HAPPEN: NOT AT ALL

## 2024-06-05 ASSESSMENT — PATIENT HEALTH QUESTIONNAIRE - PHQ9
10. IF YOU CHECKED OFF ANY PROBLEMS, HOW DIFFICULT HAVE THESE PROBLEMS MADE IT FOR YOU TO DO YOUR WORK, TAKE CARE OF THINGS AT HOME, OR GET ALONG WITH OTHER PEOPLE: NOT DIFFICULT AT ALL
SUM OF ALL RESPONSES TO PHQ QUESTIONS 1-9: 1
SUM OF ALL RESPONSES TO PHQ QUESTIONS 1-9: 1

## 2024-06-05 ASSESSMENT — SOCIAL DETERMINANTS OF HEALTH (SDOH): HOW OFTEN DO YOU GET TOGETHER WITH FRIENDS OR RELATIVES?: TWICE A WEEK

## 2024-06-06 ENCOUNTER — OFFICE VISIT (OUTPATIENT)
Dept: FAMILY MEDICINE | Facility: CLINIC | Age: 79
End: 2024-06-06
Payer: COMMERCIAL

## 2024-06-06 VITALS
HEIGHT: 64 IN | WEIGHT: 179.9 LBS | TEMPERATURE: 99.4 F | OXYGEN SATURATION: 96 % | BODY MASS INDEX: 30.71 KG/M2 | DIASTOLIC BLOOD PRESSURE: 81 MMHG | RESPIRATION RATE: 16 BRPM | HEART RATE: 82 BPM | SYSTOLIC BLOOD PRESSURE: 132 MMHG

## 2024-06-06 DIAGNOSIS — Z17.1 MALIGNANT NEOPLASM OF UPPER-OUTER QUADRANT OF RIGHT BREAST IN FEMALE, ESTROGEN RECEPTOR NEGATIVE (H): ICD-10-CM

## 2024-06-06 DIAGNOSIS — Z12.31 ENCOUNTER FOR SCREENING MAMMOGRAM FOR MALIGNANT NEOPLASM OF BREAST: ICD-10-CM

## 2024-06-06 DIAGNOSIS — G47.9 SLEEP DISORDER: ICD-10-CM

## 2024-06-06 DIAGNOSIS — R73.09 OTHER ABNORMAL GLUCOSE: ICD-10-CM

## 2024-06-06 DIAGNOSIS — C50.411 MALIGNANT NEOPLASM OF UPPER-OUTER QUADRANT OF RIGHT BREAST IN FEMALE, ESTROGEN RECEPTOR NEGATIVE (H): ICD-10-CM

## 2024-06-06 DIAGNOSIS — E78.2 MIXED HYPERLIPIDEMIA: ICD-10-CM

## 2024-06-06 DIAGNOSIS — Z00.00 ENCOUNTER FOR MEDICARE ANNUAL WELLNESS EXAM: Primary | ICD-10-CM

## 2024-06-06 DIAGNOSIS — G62.0 PERIPHERAL NEUROPATHY DUE TO CHEMOTHERAPY (H): ICD-10-CM

## 2024-06-06 DIAGNOSIS — T45.1X5A PERIPHERAL NEUROPATHY DUE TO CHEMOTHERAPY (H): ICD-10-CM

## 2024-06-06 LAB
ERYTHROCYTE [DISTWIDTH] IN BLOOD BY AUTOMATED COUNT: 12.9 % (ref 10–15)
HCT VFR BLD AUTO: 39.7 % (ref 35–47)
HGB BLD-MCNC: 13.2 G/DL (ref 11.7–15.7)
MCH RBC QN AUTO: 33 PG (ref 26.5–33)
MCHC RBC AUTO-ENTMCNC: 33.2 G/DL (ref 31.5–36.5)
MCV RBC AUTO: 99 FL (ref 78–100)
PLATELET # BLD AUTO: 186 10E3/UL (ref 150–450)
RBC # BLD AUTO: 4 10E6/UL (ref 3.8–5.2)
WBC # BLD AUTO: 4.3 10E3/UL (ref 4–11)

## 2024-06-06 PROCEDURE — 80061 LIPID PANEL: CPT | Performed by: FAMILY MEDICINE

## 2024-06-06 PROCEDURE — 36415 COLL VENOUS BLD VENIPUNCTURE: CPT | Performed by: FAMILY MEDICINE

## 2024-06-06 PROCEDURE — 85027 COMPLETE CBC AUTOMATED: CPT | Performed by: FAMILY MEDICINE

## 2024-06-06 PROCEDURE — 82947 ASSAY GLUCOSE BLOOD QUANT: CPT | Performed by: FAMILY MEDICINE

## 2024-06-06 PROCEDURE — G0439 PPPS, SUBSEQ VISIT: HCPCS | Performed by: FAMILY MEDICINE

## 2024-06-06 RX ORDER — RESPIRATORY SYNCYTIAL VIRUS VACCINE 120MCG/0.5
0.5 KIT INTRAMUSCULAR ONCE
Qty: 1 EACH | Refills: 0 | Status: CANCELLED | OUTPATIENT
Start: 2024-06-06 | End: 2024-06-06

## 2024-06-06 RX ORDER — SIMVASTATIN 20 MG
20 TABLET ORAL AT BEDTIME
Qty: 90 TABLET | Refills: 3 | Status: SHIPPED | OUTPATIENT
Start: 2024-06-06

## 2024-06-06 RX ORDER — CHOLECALCIFEROL (VITAMIN D3) 50 MCG
1 TABLET ORAL DAILY
COMMUNITY

## 2024-06-06 ASSESSMENT — ACTIVITIES OF DAILY LIVING (ADL): CURRENT_FUNCTION: NO ASSISTANCE NEEDED

## 2024-06-06 ASSESSMENT — PAIN SCALES - GENERAL: PAINLEVEL: NO PAIN (0)

## 2024-06-06 NOTE — LETTER
June 7, 2024      Kylah Jansen  [address] [address]        Dear ,    We are writing to inform you of your test results.    Glucose is slightly elevated, this is considered prediabetes.  Cholesterol levels are well-controlled with your cholesterol medication.  Blood counts are normal    Resulted Orders   Lipid panel reflex to direct LDL Non-fasting   Result Value Ref Range    Cholesterol 159 <200 mg/dL    Triglycerides 57 <150 mg/dL    Direct Measure HDL 63 >=50 mg/dL    LDL Cholesterol Calculated 85 <=100 mg/dL    Non HDL Cholesterol 96 <130 mg/dL    Patient Fasting > 8hrs? Yes     Narrative    Cholesterol  Desirable:  <200 mg/dL    Triglycerides  Normal:  Less than 150 mg/dL  Borderline High:  150-199 mg/dL  High:  200-499 mg/dL  Very High:  Greater than or equal to 500 mg/dL    Direct Measure HDL  Female:  Greater than or equal to 50 mg/dL   Male:  Greater than or equal to 40 mg/dL    LDL Cholesterol  Desirable:  <100mg/dL  Above Desirable:  100-129 mg/dL   Borderline High:  130-159 mg/dL   High:  160-189 mg/dL   Very High:  >= 190 mg/dL    Non HDL Cholesterol  Desirable:  130 mg/dL  Above Desirable:  130-159 mg/dL  Borderline High:  160-189 mg/dL  High:  190-219 mg/dL  Very High:  Greater than or equal to 220 mg/dL   CBC with platelets   Result Value Ref Range    WBC Count 4.3 4.0 - 11.0 10e3/uL    RBC Count 4.00 3.80 - 5.20 10e6/uL    Hemoglobin 13.2 11.7 - 15.7 g/dL    Hematocrit 39.7 35.0 - 47.0 %    MCV 99 78 - 100 fL    MCH 33.0 26.5 - 33.0 pg    MCHC 33.2 31.5 - 36.5 g/dL    RDW 12.9 10.0 - 15.0 %    Platelet Count 186 150 - 450 10e3/uL   Glucose   Result Value Ref Range    Glucose 105 (H) 70 - 99 mg/dL    Patient Fasting > 8hrs? Yes        If you have any questions or concerns, please call the clinic at the number listed above.       Sincerely,      Mario Marie MD

## 2024-06-06 NOTE — PROGRESS NOTES
"Preventive Care Visit  [facility]  VALERIA PAZ MD, Family Medicine  Jun 6, 2024      Assessment & Plan     Encounter for Medicare annual wellness exam       Mixed hyperlipidemia     - Lipid panel reflex to direct LDL Non-fasting  - simvastatin (ZOCOR) 20 MG tablet  Dispense: 90 tablet; Refill: 3  - Lipid panel reflex to direct LDL Non-fasting    Malignant neoplasm of upper-outer quadrant of right breast in female, estrogen receptor negative (H)  Getting updated lab work and mammogram.  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  No recent changes.  Will continue to monitor.      Patient has been advised of split billing requirements and indicates understanding: Yes        BMI  Estimated body mass index is 31.12 kg/m  as calculated from the following:    Height as of this encounter: 1.619 m (5' 3.75\").    Weight as of this encounter: 81.6 kg (179 lb 14.4 oz).       Counseling  Appropriate preventive services were discussed with this patient, including applicable screening as appropriate for fall prevention, nutrition, physical activity, Tobacco-use cessation, weight loss and cognition.  Checklist reviewing preventive services available has been given to the patient.  Reviewed patient's diet, addressing concerns and/or questions.   She is at risk for lack of exercise and has been provided with information to increase physical activity for the benefit of her well-being.   Information on urinary incontinence and treatment options given to patient.           Subjective   Pat is a 78 year old, presenting for the following:  Physical (Labs Fasting)        6/6/2024     8:32 AM "   Additional Questions   Roomed by          Health Care Directive  Patient does not have a Health Care Directive or Living Will    Healthy Habits:     In general, how would you rate your overall health?  Very good    Frequency of exercise:  2-3 days/week    Duration of exercise:  15-30 minutes    Taking medications regularly:  Yes    Barriers to taking medications:  Not applicable    Medication side effects:  None    Ability to successfully perform activities of daily living:  No assistance needed    Home Safety:  No safety concerns identified    Hearing Impairment:  No hearing concerns    In the past 6 months, have you been bothered by leaking of urine? Yes    In general, how would you rate your overall mental or emotional health?  Excellent    Additional concerns today:  Yes (Daughter thinks she has sleep apnea)                 6/5/2024   General Health   How would you rate your overall physical health? Good   Feel stress (tense, anxious, or unable to sleep) Not at all         6/5/2024   Nutrition   Diet: Regular (no restrictions)         6/5/2024   Exercise   Days per week of moderate/strenous exercise 3 days   Average minutes spent exercising at this level 20 min         6/5/2024   Social Factors   Frequency of gathering with friends or relatives Twice a week   Worry food won't last until get money to buy more No   Food not last or not have enough money for food? No   Do you have housing?  Yes   Are you worried about losing your housing? No   Lack of transportation? No   Unable to get utilities (heat,electricity)? No         6/6/2024   Fall Risk   Gait Speed Test Interpretation Greater than 5.01 seconds - ABNORMAL           6/5/2024   Activities of Daily Living- Home Safety   Needs help with the following daily activites None of the above   Safety concerns in the home None of the above         6/5/2024   Dental   Dentist two times every year? Yes         6/5/2024   Hearing Screening   Hearing concerns? None of  the above         6/5/2024   Driving Risk Screening   Patient/family members have concerns about driving No         6/5/2024   General Alertness/Fatigue Screening   Have you been more tired than usual lately? No         6/5/2024   Urinary Incontinence Screening   Bothered by leaking urine in past 6 months Yes         6/5/2024   TB Screening   Were you born outside of the [place]? No       Today's PHQ-9 Score:       6/5/2024    10:52 AM   PHQ-9 SCORE   PHQ-9 Total Score MyChart 1 (Minimal depression)   PHQ-9 Total Score 1         6/5/2024   Substance Use   Alcohol more than 3/day or more than 7/wk No   Do you have a current opioid prescription? No   How severe/bad is pain from 1 to 10? 0/10 (No Pain)   Do you use any other substances recreationally? No     Social History     Tobacco Use    Smoking status: Never     Passive exposure: Never    Smokeless tobacco: Never    Tobacco comments:     No exposure   Vaping Use    Vaping status: Never Used   Substance Use Topics    Alcohol use: Yes     Alcohol/week: 1.7 standard drinks of alcohol    Drug use: No           11/27/2023   LAST FHS-7 RESULTS   1st degree relative breast or ovarian cancer No   Any relative bilateral breast cancer No   Any male have breast cancer No   Any ONE woman have BOTH breast AND ovarian cancer No   Any woman with breast cancer before 50yrs No   2 or more relatives with breast AND/OR ovarian cancer No   2 or more relatives with breast AND/OR bowel cancer No            ASCVD Risk   The 10-year ASCVD risk score (William AVILEZ, et al., 2019) is: 25.8%    Values used to calculate the score:      Age: 78 years      Sex: Female      Is Non- : No      Diabetic: No      Tobacco smoker: No      Systolic Blood Pressure: 132 mmHg      Is BP treated: No      HDL Cholesterol: 56 mg/dL      Total Cholesterol: 163 mg/dL            Reviewed and updated as needed this visit by Provider                      Current providers sharing in care  "for this patient include:  Patient Care Team:  Mario Marie MD as PCP - General  Kristy Mina MD as MD (Hematology & Oncology)  Reggie Osorio CNP as Assigned Cancer Care Provider  Mario Marie MD as Assigned PCP    The following health maintenance items are reviewed in Epic and correct as of today:  Health Maintenance   Topic Date Due    RSV VACCINE (Pregnancy & 60+) (1 - 1-dose 60+ series) Never done    COVID-19 Vaccine (4 - 2023-24 season) 09/01/2023    LIPID  05/31/2024    ANNUAL REVIEW OF HM ORDERS  05/31/2024    MEDICARE ANNUAL WELLNESS VISIT  05/31/2024    MAMMO SCREENING  11/27/2024    FALL RISK ASSESSMENT  06/06/2025    GLUCOSE  05/31/2026    ADVANCE CARE PLANNING  06/07/2028    DTAP/TDAP/TD IMMUNIZATION (2 - Td or Tdap) 02/10/2032    DEXA  09/01/2038    HEPATITIS C SCREENING  Completed    PHQ-2 (once per calendar year)  Completed    INFLUENZA VACCINE  Completed    Pneumococcal Vaccine: 65+ Years  Completed    ZOSTER IMMUNIZATION  Completed    IPV IMMUNIZATION  Aged Out    HPV IMMUNIZATION  Aged Out    MENINGITIS IMMUNIZATION  Aged Out    RSV MONOCLONAL ANTIBODY  Aged Out    COLORECTAL CANCER SCREENING  Discontinued         Review of Systems  Constitutional, HEENT, cardiovascular, pulmonary, gi and gu systems are negative, except as otherwise noted.     Objective    Exam  /81 (BP Location: Right arm, Patient Position: Sitting, Cuff Size: Adult Large)   Pulse 82   Temp 99.4  F (37.4  C) (Oral)   Resp 16   Ht 1.619 m (5' 3.75\")   Wt 81.6 kg (179 lb 14.4 oz)   LMP  (LMP Unknown)   SpO2 96%   Breastfeeding No   BMI 31.12 kg/m     Estimated body mass index is 31.12 kg/m  as calculated from the following:    Height as of this encounter: 1.619 m (5' 3.75\").    Weight as of this encounter: 81.6 kg (179 lb 14.4 oz).    Physical Exam  GENERAL: alert and no distress  NECK: no adenopathy, no asymmetry, masses, or scars  RESP: lungs clear to auscultation - no rales, rhonchi or wheezes  CV: " regular rate and rhythm, normal S1 S2, no S3 or S4, no murmur, click or rub, no peripheral edema  ABDOMEN: soft, nontender, no hepatosplenomegaly, no masses and bowel sounds normal  MS: no gross musculoskeletal defects noted, no edema        6/6/2024   Mini Cog   Clock Draw Score 2 Normal   3 Item Recall 3 objects recalled   Mini Cog Total Score 5              Signed Electronically by: VALERIA PAZ MD    Answers submitted by the patient for this visit:  Patient Health Questionnaire (Submitted on 6/5/2024)  If you checked off any problems, how difficult have these problems made it for you to do your work, take care of things at home, or get along with other people?: Not difficult at all  PHQ9 TOTAL SCORE: 1  NIC-7 (Submitted on 6/5/2024)  NIC 7 TOTAL SCORE: 0

## 2024-06-06 NOTE — PATIENT INSTRUCTIONS
"Preventive Care Advice   This is general advice we often give to help people stay healthy. Your care team may have specific advice just for you. Please talk to your care team about your own preventive care needs.  Lifestyle  Exercise at least 150 minutes each week (30 minutes a day, 5 days a week).  Do muscle strengthening activities 2 days a week. These help control your weight and prevent disease.  No smoking.  Wear sunscreen to prevent skin cancer.  Have your home tested for radon every 2 to 5 years. Radon is a colorless, odorless gas that can harm your lungs. To learn more, go to [email or web address]. and search for \"Radon in Homes.\"  Keep guns unloaded and locked up in a safe place like a safe or gun vault, or, use a gun lock and hide the keys. Always lock away bullets separately. To learn more, visit [place].gov and search for \"safe gun storage.\"  Nutrition  Eat 5 or more servings of fruits and vegetables each day.  Try wheat bread, brown rice and whole grain pasta (instead of white bread, rice, and pasta).  Get enough calcium and vitamin D. Check the label on foods and aim for 100% of the RDA (recommended daily allowance).  Regular exams  Have a dental exam and cleaning every 6 months.  See your health care team every year to talk about:  Any changes in your health.  Any medicines your care team has prescribed.  Preventive care, family planning, and ways to prevent chronic diseases.  Shots (vaccines)   HPV shots (up to age 26), if you've never had them before.  Hepatitis B shots (up to age 59), if you've never had them before.  COVID-19 shot: Get this shot when it's due.  Flu shot: Get a flu shot every year.  Tetanus shot: Get a tetanus shot every 10 years.  Pneumococcal, hepatitis A, and RSV shots: Ask your care team if you need these based on your risk.  Shingles shot (for age 50 and up).  General health tests  Diabetes screening:  Starting at age 35, Get screened for diabetes at least every 3 years.  If " you are younger than age 35, ask your care team if you should be screened for diabetes.  Cholesterol test: At age 39, start having a cholesterol test every 5 years, or more often if advised.  Bone density scan (DEXA): At age 50, ask your care team if you should have this scan for osteoporosis (brittle bones).  Hepatitis C: Get tested at least once in your life.  Abdominal aortic aneurysm screening: Talk to your doctor about having this screening if you:  Have ever smoked; and  Are biologically male; and  Are between the ages of 65 and 75.  STIs (sexually transmitted infections)  Before age 24: Ask your care team if you should be screened for STIs.  After age 24: Get screened for STIs if you're at risk. You are at risk for STIs (including HIV) if:  You are sexually active with more than one person.  You don't use condoms every time.  You or a partner was diagnosed with a sexually transmitted infection.  If you are at risk for HIV, ask about PrEP medicine to prevent HIV.  Get tested for HIV at least once in your life, whether you are at risk for HIV or not.  Cancer screening tests  Cervical cancer screening: If you have a cervix, begin getting regular cervical cancer screening tests at age 21. Most people who have regular screenings with normal results can stop after age 65. Talk about this with your provider.  Breast cancer scan (mammogram): If you've ever had breasts, begin having regular mammograms starting at age 40. This is a scan to check for breast cancer.  Colon cancer screening: It is important to start screening for colon cancer at age 45.  Have a colonoscopy test every 10 years (or more often if you're at risk) Or, ask your provider about stool tests like a FIT test every year or Cologuard test every 3 years.  To learn more about your testing options, visit: www.[employer]/713000.pdf.  For help making a decision, visit: bernard/uf37161.  Prostate cancer screening test: If you have a prostate and are age 55  to 69, ask your provider if you would benefit from a yearly prostate cancer screening test.  Lung cancer screening: If you are a current or former smoker age 50 to 80, ask your care team if ongoing lung cancer screenings are right for you.  For informational purposes only. Not to replace the advice of your health care provider. Copyright   2023 [employer]. All rights reserved. Clinically reviewed by the [facility]. [employer] 102009 - REV 04/24.    Preventing Falls: Care Instructions  Injuries and health problems such as trouble walking or poor eyesight can increase your risk of falling. So can some medicines. But there are things you can do to help prevent falls. You can exercise to get stronger. You can also arrange your home to make it safer.    Talk to your doctor about the medicines you take. Ask if any of them increase the risk of falls and whether they can be changed or stopped.   Try to exercise regularly. It can help improve your strength and balance. This can help lower your risk of falling.     Practice fall safety and prevention.    Wear low-heeled shoes that fit well and give your feet good support. Talk to your doctor if you have foot problems that make this hard.  Carry a cellphone or wear a medical alert device that you can use to call for help.  Use stepladders instead of chairs to reach high objects. Don't climb if you're at risk for falls. Ask for help, if needed.  Wear the correct eyeglasses, if you need them.    Make your home safer.    Remove rugs, cords, clutter, and furniture from walkways.  Keep your house well lit. Use night-lights in hallways and bathrooms.  Install and use sturdy handrails on stairways.  Wear nonskid footwear, even inside. Don't walk barefoot or in socks without shoes.    Be safe outside.    Use handrails, curb cuts, and ramps whenever possible.  Keep your hands free by using a shoulder bag or backpack.  Try to walk in well-lit  "areas. Watch out for uneven ground, changes in pavement, and debris.  Be careful in the winter. Walk on the grass or gravel when sidewalks are slippery. Use de-icer on steps and walkways. Add non-slip devices to shoes.    Put grab bars and nonskid mats in your shower or tub and near the toilet. Try to use a shower chair or bath bench when bathing.   Get into a tub or shower by putting in your weaker leg first. Get out with your strong side first. Have a phone or medical alert device in the bathroom with you.   Where can you learn more?  Go to https://www.[employer].net/patiented  Enter G117 in the search box to learn more about \"Preventing Falls: Care Instructions.\"  Current as of: July 17, 2023               Content Version: 14.0    5101-5543 [employer].   Care instructions adapted under license by your healthcare professional. If you have questions about a medical condition or this instruction, always ask your healthcare professional. [employer] disclaims any warranty or liability for your use of this information.      Hearing Loss: Care Instructions  Overview     Hearing loss is a sudden or slow decrease in how well you hear. It can range from slight to profound. Permanent hearing loss can occur with aging. It also can happen when you are exposed long-term to loud noise. Examples include listening to loud music, riding motorcycles, or being around other loud machines.  Hearing loss can affect your work and home life. It can make you feel lonely or depressed. You may feel that you have lost your independence. But hearing aids and other devices can help you hear better and feel connected to others.  Follow-up care is a key part of your treatment and safety. Be sure to make and go to all appointments, and call your doctor if you are having problems. It's also a good idea to know your test results and keep a list of the medicines you take.  How can you care for yourself at home?  Avoid " loud noises whenever possible. This helps keep your hearing from getting worse.  Always wear hearing protection around loud noises.  Wear a hearing aid as directed.  A professional can help you pick a hearing aid that will work best for you.  You can also get hearing aids over the counter for mild to moderate hearing loss.  Have hearing tests as your doctor suggests. They can show whether your hearing has changed. Your hearing aid may need to be adjusted.  Use other devices as needed. These may include:  Telephone amplifiers and hearing aids that can connect to a television, stereo, radio, or microphone.  Devices that use lights or vibrations. These alert you to the doorbell, a ringing telephone, or a baby monitor.  Television closed-captioning. This shows the words at the bottom of the screen. Most new TVs can do this.  TTY (text telephone). This lets you type messages back and forth on the telephone instead of talking or listening. These devices are also called TDD. When messages are typed on the keyboard, they are sent over the phone line to a receiving TTY. The message is shown on a monitor.  Use text messaging, social media, and email if it is hard for you to communicate by telephone.  Try to learn a listening technique called speechreading. It is not lipreading. You pay attention to people's gestures, expressions, posture, and tone of voice. These clues can help you understand what a person is saying. Face the person you are talking to, and have them face you. Make sure the lighting is good. You need to see the other person's face clearly.  Think about counseling if you need help to adjust to your hearing loss.  When should you call for help?  Watch closely for changes in your health, and be sure to contact your doctor if:    You think your hearing is getting worse.     You have new symptoms, such as dizziness or nausea.   Where can you learn more?  Go to https://www.healthwise.net/patiented  Enter R798 in the  "search box to learn more about \"Hearing Loss: Care Instructions.\"  Current as of: September 27, 2023               Content Version: 14.0    7054-0923 [employer].   Care instructions adapted under license by your healthcare professional. If you have questions about a medical condition or this instruction, always ask your healthcare professional. [employer] disclaims any warranty or liability for your use of this information.      Bladder Training: Care Instructions  Your Care Instructions     Bladder training is used to treat urge incontinence and stress incontinence. Urge incontinence means that the need to urinate comes on so fast that you can't get to a toilet in time. Stress incontinence means that you leak urine because of pressure on your bladder. For example, it may happen when you laugh, cough, or lift something heavy.  Bladder training can increase how long you can wait before you have to urinate. It can also help your bladder hold more urine. And it can give you better control over the urge to urinate.  It is important to remember that bladder training takes a few weeks to a few months to make a difference. You may not see results right away, but don't give up.  Follow-up care is a key part of your treatment and safety. Be sure to make and go to all appointments, and call your doctor if you are having problems. It's also a good idea to know your test results and keep a list of the medicines you take.  How can you care for yourself at home?  Work with your doctor to come up with a bladder training program that is right for you. You may use one or more of the following methods.  Delayed urination  In the beginning, try to keep from urinating for 5 minutes after you first feel the need to go.  While you wait, take deep, slow breaths to relax. Kegel exercises can also help you delay the need to go to the bathroom.  After some practice, when you can easily wait 5 minutes to urinate, " "try to wait 10 minutes before you urinate.  Slowly increase the waiting period until you are able to control when you have to urinate.  Scheduled urination  Empty your bladder when you first wake up in the morning.  Schedule times throughout the day when you will urinate.  Start by going to the bathroom every hour, even if you don't need to go.  Slowly increase the time between trips to the bathroom.  When you have found a schedule that works well for you, keep doing it.  If you wake up during the night and have to urinate, do it. Apply your schedule to waking hours only.  Kegel exercises  These tighten and strengthen pelvic muscles, which can help you control the flow of urine. (If doing these exercises causes pain, stop doing them and talk with your doctor.) To do Kegel exercises:  Squeeze your muscles as if you were trying not to pass gas. Or squeeze your muscles as if you were stopping the flow of urine. Your belly, legs, and buttocks shouldn't move.  Hold the squeeze for 3 seconds, then relax for 5 to 10 seconds.  Start with 3 seconds, then add 1 second each week until you are able to squeeze for 10 seconds.  Repeat the exercise 10 times a session. Do 3 to 8 sessions a day.  When should you call for help?  Watch closely for changes in your health, and be sure to contact your doctor if:    Your incontinence is getting worse.     You do not get better as expected.   Where can you learn more?  Go to https://[email or web address].[email or web address]/patiented  Enter V684 in the search box to learn more about \"Bladder Training: Care Instructions.\"  Current as of: November 15, 2023               Content Version: 14.0    5844-8915 [employer].   Care instructions adapted under license by your healthcare professional. If you have questions about a medical condition or this instruction, always ask your healthcare professional. [employer] disclaims any warranty or liability for your use of this information.      "

## 2024-06-07 LAB
CHOLEST SERPL-MCNC: 159 MG/DL
FASTING STATUS PATIENT QL REPORTED: YES
FASTING STATUS PATIENT QL REPORTED: YES
GLUCOSE SERPL-MCNC: 105 MG/DL (ref 70–99)
HDLC SERPL-MCNC: 63 MG/DL
LDLC SERPL CALC-MCNC: 85 MG/DL
NONHDLC SERPL-MCNC: 96 MG/DL
TRIGL SERPL-MCNC: 57 MG/DL

## 2024-07-25 ENCOUNTER — TELEPHONE (OUTPATIENT)
Dept: ONCOLOGY | Facility: HOSPITAL | Age: 79
End: 2024-07-25
Payer: COMMERCIAL

## 2024-07-25 NOTE — TELEPHONE ENCOUNTER
Voicemail received from patient questions whether she needs to have a follow up with the cancer clinic or if she can just see her primary care.    Return call placed to patient. A detailed message is left on identifying voicemail. Per last office visit note with Reggie Osorio NP on 8/16/23 - We again discussed that since she is > 5 years out from completion of treatment, risk of recurrence of the breast cancer is quite low and she could now have follow-up with her PCP.  She continues to want us to follow yearly.     Patient is advised to call the triage nurse back to let us know what her decision is.    Aida Avendano RN

## 2024-07-25 NOTE — TELEPHONE ENCOUNTER
Patient returned call, she would like to follow with her primary care at this point in time. She will call if she has any further questions or concerns.    Aida Avendano RN

## 2024-12-02 ENCOUNTER — HOSPITAL ENCOUNTER (OUTPATIENT)
Dept: MAMMOGRAPHY | Facility: CLINIC | Age: 79
Discharge: HOME | End: 2024-12-02
Attending: FAMILY MEDICINE | Admitting: FAMILY MEDICINE
Payer: COMMERCIAL

## 2024-12-02 DIAGNOSIS — Z17.1 MALIGNANT NEOPLASM OF UPPER-OUTER QUADRANT OF RIGHT BREAST IN FEMALE, ESTROGEN RECEPTOR NEGATIVE (H): ICD-10-CM

## 2024-12-02 DIAGNOSIS — Z12.31 ENCOUNTER FOR SCREENING MAMMOGRAM FOR MALIGNANT NEOPLASM OF BREAST: ICD-10-CM

## 2024-12-02 DIAGNOSIS — C50.411 MALIGNANT NEOPLASM OF UPPER-OUTER QUADRANT OF RIGHT BREAST IN FEMALE, ESTROGEN RECEPTOR NEGATIVE (H): ICD-10-CM

## 2024-12-02 PROCEDURE — 77063 BREAST TOMOSYNTHESIS BI: CPT

## 2025-01-08 ASSESSMENT — SLEEP AND FATIGUE QUESTIONNAIRES
HOW LIKELY ARE YOU TO NOD OFF OR FALL ASLEEP WHILE SITTING AND READING: WOULD NEVER DOZE
HOW LIKELY ARE YOU TO NOD OFF OR FALL ASLEEP IN A CAR, WHILE STOPPED FOR A FEW MINUTES IN TRAFFIC: WOULD NEVER DOZE
HOW LIKELY ARE YOU TO NOD OFF OR FALL ASLEEP WHILE SITTING AND TALKING TO SOMEONE: WOULD NEVER DOZE
HOW LIKELY ARE YOU TO NOD OFF OR FALL ASLEEP WHILE LYING DOWN TO REST IN THE AFTERNOON WHEN CIRCUMSTANCES PERMIT: SLIGHT CHANCE OF DOZING
HOW LIKELY ARE YOU TO NOD OFF OR FALL ASLEEP WHILE WATCHING TV: SLIGHT CHANCE OF DOZING
HOW LIKELY ARE YOU TO NOD OFF OR FALL ASLEEP WHILE SITTING INACTIVE IN A PUBLIC PLACE: WOULD NEVER DOZE
HOW LIKELY ARE YOU TO NOD OFF OR FALL ASLEEP WHEN YOU ARE A PASSENGER IN A CAR FOR AN HOUR WITHOUT A BREAK: WOULD NEVER DOZE
HOW LIKELY ARE YOU TO NOD OFF OR FALL ASLEEP WHILE SITTING QUIETLY AFTER LUNCH WITHOUT ALCOHOL: WOULD NEVER DOZE

## 2025-01-15 ENCOUNTER — OFFICE VISIT (OUTPATIENT)
Dept: SLEEP MEDICINE | Facility: CLINIC | Age: 80
End: 2025-01-15
Attending: FAMILY MEDICINE
Payer: COMMERCIAL

## 2025-01-15 VITALS
WEIGHT: 183.1 LBS | DIASTOLIC BLOOD PRESSURE: 107 MMHG | SYSTOLIC BLOOD PRESSURE: 165 MMHG | HEIGHT: 64 IN | HEART RATE: 100 BPM | OXYGEN SATURATION: 98 % | BODY MASS INDEX: 31.26 KG/M2

## 2025-01-15 DIAGNOSIS — R03.0 ELEVATED BLOOD PRESSURE READING IN OFFICE WITHOUT DIAGNOSIS OF HYPERTENSION: ICD-10-CM

## 2025-01-15 DIAGNOSIS — R06.83 SNORING: ICD-10-CM

## 2025-01-15 DIAGNOSIS — R06.81 WITNESSED APNEIC SPELLS: Primary | ICD-10-CM

## 2025-01-15 PROCEDURE — 99205 OFFICE O/P NEW HI 60 MIN: CPT | Performed by: PHYSICIAN ASSISTANT

## 2025-01-15 NOTE — PATIENT INSTRUCTIONS
"          MY TREATMENT INFORMATION FOR SLEEP APNEA-  Alissa Jansen    Am I having a home sleep study?  --->Watch the video for the device you are using:    -/drop off device-   https://www.youVictoria Plumb.com/watch?v=yGGFBdELGhk    Frequently asked questions:  1. What is Obstructive Sleep Apnea (VERA)? VERA is the most common type of sleep apnea. Apnea means, \"without breath.\"  Apnea is most often caused by narrowing or collapse of the upper airway as muscles relax during sleep.   Almost everyone has occasional apneas. Most people with sleep apnea have had brief interruptions at night frequently for many years.  The severity of sleep apnea is related to how frequent and severe the events are.   2. What are the consequences of VERA? Symptoms include: feeling sleepy during the day, snoring loudly, gasping or stopping of breathing, trouble sleeping, and occasionally morning headaches or heartburn at night.  Sleepiness can be serious and even increase the risk of falling asleep while driving. Other health consequences may include development of high blood pressure and other cardiovascular disease in persons who are susceptible. Untreated VERA  can contribute to heart disease, stroke and diabetes.   3. What are the treatment options? In most situations, sleep apnea is a lifelong disease that must be managed with daily therapy. Medications are not effective for sleep apnea and surgery is generally not considered until other therapies have been tried. Your treatment is your choice . Continuous Positive Airway (CPAP) works right away and is the therapy that is effective in nearly everyone. An oral device to hold your jaw forward is usually the next most reliable option. Other options include postioning devices (to keep you off your back), weight loss, and surgery including a tongue pacing device. There is more detail about some of these options below.  4. Are my sleep studies covered by insurance? Although we will request " verification of coverage, we advise you also check in advance of the study to ensure there is coverage.    Important tips for those choosing CPAP and similar devices  REMEMBER-IF YOU RECEIVE A CALL FROM  [phone]-->IT IS TO SETUP A DEVICE  For new devices, sign up for device ZIA to monitor your device for your followup visits  We encourage you to utilize the [employer] zia or website ( https://Ztory.Twistle/ ) to monitor your therapy progress and share the data with your healthcare team when you discuss your sleep apnea.                                                    Know your equipment:  CPAP is continuous positive airway pressure that prevents obstructive sleep apnea by keeping the throat from collapsing while you are sleeping. In most cases, the device is  smart  and can slowly self-adjusts if your throat collapses and keeps a record every day of how well you are treated-this information is available to you and your care team.  BPAP is bilevel positive airway pressure that keeps your throat open and also assists each breath with a pressure boost to maintain adequate breathing.  Special kinds of BPAP are used in patients who have inadequate breathing from lung or heart disease. In most cases, the device is  smart  and can slowly self-adjusts to assist breathing. Like CPAP, the device keeps a record of how well you are treated.  Your mask is your connection to the device. You get to choose what feels most comfortable and the staff will help to make sure if fits. Here: are some examples of the different masks that are available: Magnetic mask aids may assist with use but there are safety issues that should be addressed when considering with magnets* ( see end of discussion).       Key points to remember on your journey with sleep apnea:  Sleep study.  PAP devices often need to be adjusted during a sleep study to show that they are effective and adjusted right.  Good tips to remember: Try wearing just  the mask during a quiet time during the day so your body adapts to wearing it. A humidifier is recommended for comfort in most cases to prevent drying of your nose and throat. Allergy medication from your provider may help you if you are having nasal congestion.  Getting settled-in. It takes more than one night for most of us to get used to wearing a mask. Try wearing just the mask during a quiet time during the day so your body adapts to wearing it. A humidifier is recommended for comfort in most cases. Our team will work with you carefully on the first day and will be in contact within 4 days and again at 2 and 4 weeks for advice and remote device adjustments. Your therapy is evaluated by the device each day.   Use it every night. The more you are able to sleep naturally for 7-8 hours, the more likely you will have good sleep and to prevent health risks or symptoms from sleep apnea. Even if you use it 4 hours it helps. Occasionally all of us are unable to use a medical therapy, in sleep apnea, it is not dangerous to miss one night.   Communicate. Call our skilled team on the number provided on the first day if your visit for problems that make it difficult to wear the device. Over 2 out of 3 patients can learn to wear the device long-term with help from our team. Remember to call our team or your sleep providers if you are unable to wear the device as we may have other solutions for those who cannot adapt to mask CPAP therapy. It is recommended that you sleep your sleep provider within the first 3 months and yearly after that if you are not having problems.   Use it for your health. We encourage use of CPAP masks during daytime quiet periods to allow your face and brain to adapt to the sensation of CPAP so that it will be a more natural sensation to awaken to at night or during naps. This can be very useful during the first few weeks or months of adapting to CPAP though it does not help medically to wear CPAP  during wakefulness and  should not be used as a strategy just to meet guidelines.  Take care of your equipment. Make sure you clean your mask and tubing using directions every day and that your filter and mask are replaced as recommended or if they are not working.     *Masks with magnets:  Updated Contraindications  Masks with magnetic components are contraindicated for use by patients where they, or anyone in close physical contact while using the mask, have the following:   Active medical implants that interact with magnets (i.e., pacemakers, implantable cardioverter defibrillators (ICD), neurostimulators, cerebrospinal fluid (CSF) shunts, insulin/infusion pumps)   Metallic implants/objects containing ferromagnetic material (i.e., aneurysm clips/flow disruption devices, embolic coils, stents, valves, electrodes, implants to restore hearing or balance with implanted magnets, ocular implants, metallic splinters in the eye)  Updated Warning  Keep the mask magnets at a safe distance of at least 6 inches (150 mm) away from implants or medical devices that may be adversely affected by magnetic interference. This warning applies to you or anyone in close physical contact with your mask. The magnets are in the frame and lower headgear clips, with a magnetic field strength of up to 400mT. When worn, they connect to secure the mask but may inadvertently detach while asleep.  Implants/medical devices, including those listed within contraindications, may be adversely affected if they change function under external magnetic fields or contain ferromagnetic materials that attract/repel to magnetic fields (some metallic implants, e.g., contact lenses with metal, dental implants, metallic cranial plates, screws, yousif hole covers, and bone substitute devices). Consult your physician and  of your implant / other medical device for information on the potential adverse effects of magnetic fields.    BESIDES CPAP, WHAT  OTHER THERAPIES ARE THERE?    Positioning Device  Positioning devices are generally used when sleep apnea is mild and only occurs on your back.This example shows a pillow that straps around the waist. It may be appropriate for those whose sleep study shows milder sleep apnea that occurs primarily when lying flat on one's back. Preliminary studies have shown benefit but effectiveness at home may need to be verified by a home sleep test. These devices are generally not covered by medical insurance.  Examples of devices that maintain sleeping on the back to prevent snoring and mild sleep apnea.    Belt type body positioner  http://Group Commerce.Gridsum/    Electronic reminder  http://nightshifttherapy.com/            Oral Appliance  What is oral appliance therapy?  An oral appliance device fits on your teeth at night like a retainer used after having braces. The device is made by a specialized dentist and requires several visits over 1-2 months before a manufactured device is made to fit your teeth and is adjusted to prevent your sleep apnea. Once an oral device is working properly, snoring should be improved. A home sleep test may be recommended at that time if to determine whether the sleep apnea is adequately treated.       Some things to remember:  -Oral devices are often, but not always, covered by your medical insurance. Be sure to check with your insurance provider.   -If you are referred for oral therapy, you will be given a list of specialized dentists to consider or you may choose to visit the Web site of the American Academy of Dental Sleep Medicine  -Oral devices are less likely to work if you have severe sleep apnea or are extremely overweight.     More detailed information  An oral appliance is a small acrylic device that fits over the upper and lower teeth  (similar to a retainer or a mouth guard). This device slightly moves jaw forward, which moves the base of the tongue forward, opens the airway, improves  breathing for effective treat snoring and obstructive sleep apnea in perhaps 7 out of 10 people .  The best working devices are custom-made by a dental device  after a mold is made of the teeth 1, 2, 3.  When is an oral appliance indicated?  Oral appliance therapy is recommended as a first-line treatment for patients with primary snoring, mild sleep apnea, and for patients with moderate sleep apnea who prefer appliance therapy to use of CPAP4, 5. Severity of sleep apnea is determined by sleep testing and is based on the number of respiratory events per hour of sleep.   How successful is oral appliance therapy?  The success rate of oral appliance therapy in patients with mild sleep apnea is 75-80% while in patients with moderate sleep apnea it is 50-70%. The chance of success in patients with severe sleep apnea is 40-50%. The research also shows that oral appliances have a beneficial effect on the cardiovascular health of VERA patients at the same magnitude as CPAP therapy7.  Oral appliances should be a second-line treatment in cases of severe sleep apnea, but if not completely successful then a combination therapy utilizing CPAP plus oral appliance therapy may be effective. Oral appliances tend to be effective in a broad range of patients although studies show that the patients who have the highest success are females, younger patients, those with milder disease, and less severe obesity. 3, 6.   Finding a dentist that practices dental sleep medicine  Specific training is available through the American Academy of Dental Sleep Medicine for dentists interested in working in the field of sleep. To find a dentist who is educated in the field of sleep and the use of oral appliances, near you, visit the Web site of the American Academy of Dental Sleep Medicine.    References  1. Lissy et al. Objectively measured vs self-reported compliance during oral appliance therapy for sleep-disordered breathing. Chest  2013; 144(5): 5858-6432.  2. Bharathi, et al. Objective measurement of compliance during oral appliance therapy for sleep-disordered breathing. Thorax 2013; 68(1): 91-96.  3. Bhavani et al. Mandibular advancement devices in 620 men and women with VERA and snoring: tolerability and predictors of treatment success. Chest 2004; 125: 6919-7444.  4. Vivienne et al. Oral appliances for snoring and VERA: a review. Sleep 2006; 29: 244-262.  5. Cecilia et al. Oral appliance treatment for VERA: an update. J Clin Sleep Med 2014; 10(2): 215-227.  6. Leah et al. Predictors of OSAH treatment outcome. J Dent Res 2007; 86: 0709-5528.      Weight Loss:   Your Body mass index is 31.68 kg/m .    Being overweight does not necessarily mean you will have health consequences.  Those who have BMI over 35 or over 27 with existing medical conditions carries greater risk.   Weight loss decreases severity of sleep apnea in most people with obesity. For those with mild obesity who have developed snoring with weight gain, even 15-30 pound weight loss can improve and occasionally milder eliminate sleep apnea.  Structured and life-long dietary and health habits are necessary to lose weight and keep healthier weight levels.     The Comprehensive Weight loss program offers all aspects of weight loss strategies including two Non-Surgical Weight Loss Programs: Medical Weight Management and our 24 Week Healthy Lifestyle Program:    Medical Weight Management: You will meet with a Medical Weight Management Provider, as well as a Registered Dietician. The program may include medication therapy, dietary education, recommended exercise and physical therapy programs, monthly support group meetings, and possible psychological counseling. Follow up visits with the provider or dietician are scheduled based on your progress and needs.    24 Week Healthy Lifestyle Program: This unique program is designed to give you the support of weekly appointments  and activities thru a 24-week period. It may include all of the components of the basic program (above), with the addition of 11 individual Health  Visits, 24-week access to the [employer] website for over 700 online classes, and monthly support group meetings. This program has an out-of-pocket expense of $499 to cover the items that can not be billed to insurance (health coaches and [employer] access), and is non-refundable/non-transferable (you may be able to use a Health Savings Account; ask your HSA provider). There may be an optional meal replacement plan prescribed as well.   Surgical management achieves meaningful long-term weight loss and improvement in health risks in most patients with more severe obesity.      Sleep Apnea Surgery:    Surgery for obstructive sleep apnea is considered generally only when other therapies fail to work. Surgery may be discussed with you if you are having a difficult time tolerating CPAP and or when there is an abnormal structure that requires surgical correction.  Nose and throat surgeries often enlarge the airway to prevent collapse.  Most of these surgeries create pain for 1-2 weeks and up to half of the most common surgeries are not effective throughout life.  You should carefully discuss the benefits and drawbacks to surgery with your sleep provider and surgeon to determine if it is the best solution for you.   More information  Surgery for VERA is directed at areas that are responsible for narrowing or complete obstruction of the airway during sleep.  There are a wide range of procedures available to enlarge and/or stabilize the airway to prevent blockage of breathing in the three major areas where it can occur: the palate, tongue, and nasal regions.  Successful surgical treatment depends on the accurate identification of the factors responsible for obstructive sleep apnea in each person.  A personalized approach is required because there is no single treatment that  works well for everyone.  Because of anatomic variation, consultation with an examination by a sleep surgeon is a critical first step in determining what surgical options are best for each patient.  In some cases, examination during sedation may be recommended in order to guide the selection of procedures.  Patients will be counseled about risks and benefits as well as the typical recovery course after surgery. Surgery is typically not a cure for a person s VERA.  However, surgery will often significantly improve one s VERA severity (termed  success rate ).  Even in the absence of a cure, surgery will decrease the cardiovascular risk associated with OSA7; improve overall quality of life8 (sleepiness, functionality, sleep quality, etc).      Palate Procedures:  Patients with VERA often have narrowing of their airway in the region of their tonsils and uvula.  The goals of palate procedures are to widen the airway in this region as well as to help the tissues resist collapse.  Modern palate procedure techniques focus on tissue conservation and soft tissue rearrangement, rather than tissue removal.  Often the uvula is preserved in this procedure. Residual sleep apnea is common in patient after pharyngoplasty with an average reduction in sleep apnea events of 33%2.      Tongue Procedures:  ExamWhile patients are awake, the muscles that surround the throat are active and keep this region open for breathing. These muscles relax during sleep, allowing the tongue and other structures to collapse and block breathing.  There are several different tongue procedures available.  Selection of a tongue base procedure depends on characteristics seen on physical exam.  Generally, procedures are aimed at removing bulky tissues in this area or preventing the back of the tongue from falling back during sleep.  Success rates for tongue surgery range from 50-62%3.    Hypoglossal Nerve Stimulation:  Hypoglossal nerve stimulation has recently  received approval from the [place] Food and Drug Administration for the treatment of obstructive sleep apnea.  This is based on research showing that the system was safe and effective in treating sleep apnea6.  Results showed that the median AHI score decreased 68%, from 29.3 to 9.0. This therapy uses an implant system that senses breathing patterns and delivers mild stimulation to airway muscles, which keeps the airway open during sleep.  The system consists of three fully implanted components: a small generator (similar in size to a pacemaker), a breathing sensor, and a stimulation lead.  Using a small handheld remote, a patient turns the therapy on before bed and off upon awakening.    Candidates for this device must be greater than 18 years of age, have moderate to severe obstructive sleep apnea with less than 25% central events  (AHI between 15-65), BMI less than 35, have tried CPAP/oral appliance for at least 8 weeks without success, and have appropriate upper airway anatomy (determined by a sleep endoscopy performed by Dr. Hubert Aiken or Dr. Jean Leach).    Nasal Procedures:  Nasal obstruction can interfere with nasal breathing during the day and night.  Studies have shown that relief of nasal obstruction can improve the ability of some patients to tolerate positive airway pressure therapy for obstructive sleep apnea1.  Treatment options include medications such as nasal saline, topical corticosteroid and antihistamine sprays, and oral medications such as antihistamines or decongestants. Non-surgical treatments can include external nasal dilators for selected patients. If these are not successful by themselves, surgery can improve the nasal airway either alone or in combination with these other options.        Combination Procedures:  Combination of surgical procedures and other treatments may be recommended, particularly if patients have more than one area of narrowing or persistent positional  disease.  The success rate of combination surgery ranges from 66-80%2,3.    References  Barrera CANTU. The Role of the Nose in Snoring and Obstructive Sleep Apnoea: An Update.  Eur Arch Otorhinolaryngol. 2011; 268: 1365-73.   Abel SM; Shantel JA; Jh JR; Pallanch JF; Petra MB; Amado SG; Marva RODRIGUEZ. Surgical modifications of the upper airway for obstructive sleep apnea in adults: a systematic review and meta-analysis. SLEEP 2010;33(10):4412-7023. Lawrence SCHAFFER. Hypopharyngeal surgery in obstructive sleep apnea: an evidence-based medicine review.  Arch Otolaryngol Head Neck Surg. 2006 Feb;132(2):206-13.  Pernell YH1, Ethel Y, Robin SETH. The efficacy of anatomically based multilevel surgery for obstructive sleep apnea. Otolaryngol Head Neck Surg. 2003 Oct;129(4):327-35.  Lawrence SCHAFFER, Goldberg A. Hypopharyngeal Surgery in Obstructive Sleep Apnea: An Evidence-Based Medicine Review. Arch Otolaryngol Head Neck Surg. 2006 Feb;132(2):206-13.  Regulo PJ et al. Upper-Airway Stimulation for Obstructive Sleep Apnea.  N Engl J Med. 2014 Jan 9;370(2):139-49.  Blaise Y et al. Increased Incidence of Cardiovascular Disease in Middle-aged Men with Obstructive Sleep Apnea. Am J Respir Crit Care Med; 2002 166: 159-165  Aiken EM et al. Studying Life Effects and Effectiveness of Palatopharyngoplasty (SLEEP) study: Subjective Outcomes of Isolated Uvulopalatopharyngoplasty. Otolaryngol Head Neck Surg. 2011; 144: 623-631.        WHAT IF I ONLY HAVE SNORING?    Mandibular advancement devices, lateral sleep positioning, long-term weight loss and treatment of nasal allergies have been shown to improve snoring.  Exercising tongue muscles with a game (https://Mumboe.GEOCOMtms/us/zia/soundly-reduce-snoring/td2898256609) or stimulating the tongue during the day with a device (https://doi.org/10.3390/kyp77303999) have improved snoring in some  individuals.  https://www.Allihub.NanoH2O/  https://www.sleepfoundation.org/best-anti-snoring-mouthpieces-and-mouthguards    Remember to Drive Safe... Drive Alive     Sleep health profoundly affects your health, mood, and your safety.  Thirty three percent of the population (one in three of us) is not getting enough sleep and many have a sleep disorder. Not getting enough sleep or having an untreated / undertreated sleep condition may make us sleepy without even knowing it. In fact, our driving could be dramatically impaired due to our sleep health. As your provider, here are some things I would like you to know about driving:     Here are some warning signs for impairment and dangerous drowsy driving:              -Having been awake more than 16 hours               -Looking tired               -Eyelid drooping              -Head nodding (it could be too late at this point)              -Driving for more than 30 minutes     Some things you could do to make the driving safer if you are experiencing some drowsiness:              -Stop driving and rest              -Call for transportation              -Make sure your sleep disorder is adequately treated     Some things that have been shown NOT to work when experiencing drowsiness while driving:              -Turning on the radio              -Opening windows              -Eating any  distracting  /  entertaining  foods (e.g., sunflower seeds, candy, or any other)              -Talking on the phone      Your decision may not only impact your life, but also the life of others. Please, remember to drive safe for yourself and all of us.

## 2025-01-15 NOTE — PROGRESS NOTES
Outpatient Sleep Medicine Consultation:    Name: Alissa Jansen MRN# [id number]   Age: 79 year old YOB: 1945     Date of Consultation: January 15, 2025  Consultation is requested by: Mario Marie MD  [id number] [place] [address] Mario Marie  Primary care provider: Mario Marie       Reason for Sleep Consult:     Alissa Jansen is sent by Mario Marie for a sleep consultation regarding 1. Sleep disorder    Patient s Reason for visit  Alissa Jansen main reason for visit: (Patient-Rptd) I was on vacation and shared a room with my daughter and she thought i had an issue  Patient states problem(s) started:    Alissa Jansen's goals for this visit: (Patient-Rptd) To see if she has something to be concerned about         Assessment and Plan:     1. Witnessed apneic spells (Primary)  2. Snoring  3. Elevated blood pressure reading in office without diagnosis of hypertension    Patient presents to clinic today with concern of possible Obstructive Sleep Apnea (VERA).  Admits she does not think that she has any problems with sleep, visit prompted more so by her daughter's concerns.  Reports witnessed apnea and complains of loud snoring when they shared a hotel room on a trip to [place].  She otherwise lives and sleeps alone so is unaware of any significant breathing symptoms at night.  Denies ever waking up gasping or choking, or feeling short of breath.  Spent some time today discussing pathophysiology of VERA and cardiovascular complications of untreated disease such as higher risk of hypertension, cardiovascular disease, arrhythmias, stroke.  We discussed testing with in lab polysomnogram versus home sleep apnea test.  Patient admits she would be unwilling to come in for in lab PSG but would consider HST.  Orders were placed, I am not sure her insurance will cover however so we will see.  Discussed assuming testing would be positive for VERA our typical first-line  "treatment is CPAP therapy but could also consider mandibular advancement device if more mild VERA is seen.  Admits she is not at all interested in CPAP but would consider something like MAD if indicated.  Patient is requesting that we postpone HST and get her scheduled sometime in the fall, wants ample time to think about her decision about pursuing testing and hypothetical treatment as well as have more opportunities for her daughter to witness her sleep and tell us if things are getting worse.  Will plan to see her back shortly following HST is completed for formal review of results.  Education materials provided in AVS.  Lastly, encouraged her to follow-up regarding elevated blood pressure.  - HST-Home Sleep Apnea Test - Noxturnal Returnable; Future         History of Present Illness:     Alissa Jansen is a 79-year-old female with history of breast cancer, peripheral neuropathy due to chemotherapy, prediabetes, HLD who presents to clinic today after referral from PCP for evaluation of possible sleep apnea. Patient states \"I live alone I think I am fine to be honest but we were on vacation and at [place] and I shared a room with my daughter and it was a long day I did a lot of walking and when I went to bed I was zonked out but she was concerned I made weird noises and I snored and she thinks I might have stopped breathing\".     Past Sleep Evaluations:None    SLEEP-WAKE SCHEDULE:     Work/School Days: Patient goes to school/work: (Patient-Rptd) No   Usually gets into bed at (Patient-Rptd) 11.30-12:30am   Takes patient about (Patient-Rptd) 10 minutes to fall asleep, rarely can make 30 mintues   Has trouble falling asleep (Patient-Rptd) 1 night per week  Wakes up in the middle of the night (Patient-Rptd) Usually 1 sometimes 2 times.  Wakes up due to (Patient-Rptd) Use the bathroom  She has trouble falling back asleep (Patient-Rptd) 1 times a week.   It usually takes (Patient-Rptd) 5 minutes to get back to " "sleep  Patient is usually up at (Patient-Rptd) 7:30-8.30am  Uses alarm: (Patient-Rptd) No    Weekends/Non-work Days/All Other Days:  Usually gets into bed at (Patient-Rptd) 11.30 pm  Takes patient about (Patient-Rptd) 10 minutes to fall asleep  Patient is usually up at (Patient-Rptd) 8.30am  Uses alarm: (Patient-Rptd) No    Sleep Need  Patient gets  (Patient-Rptd) 8 hrs sleep on average   Patient thinks she needs about (Patient-Rptd) 6 to7 hrs sleep    Alissa Jansen prefers to sleep in this position(s): (Patient-Rptd) Stomach     Naps  Patient takes a purposeful nap (Patient-Rptd) None times a week   She dozes off unintentionally (Patient-Rptd) 0 days per week  Patient has had a driving accident or near-miss due to sleepiness/drowsiness: (Patient-Rptd) No      SLEEP DISRUPTIONS:    Breathing/Snoring  Patient snores:(Patient-Rptd) Yes  Other people complain about her snoring: (Patient-Rptd) Yes  Patient has been told she stops breathing in her sleep:(Patient-Rptd) Yes  Gasping/choking:No  She has issues with the following: (Patient-Rptd) Getting up to urinate more than once  Denies morning headache, morning nasal congestion, morning dry mouth, nocturnal GERD    Movement:  Patient gets pain, discomfort, with an urge to move:  (Patient-Rptd) No  Patient has been told she kicks her legs at night:  (Patient-Rptd) Yes - \"well I can toss and turn sometimes that is why I said it but I don't really kick\"      Behaviors in Sleep:  Denies sleepwalking, sleep talking, sleep eating, bruxism, dream enactment, recurring nightmares, night terrors, sleep paralysis, hypnagogic/hypnopompic hallucinations, cataplexy    CAFFEINE AND OTHER SUBSTANCES:    Patient consumes caffeinated beverages per day:  (Patient-Rptd) 3  Last caffeine use is usually: (Patient-Rptd) 11 am  List of any prescribed or over the counter stimulants that patient takes: (Patient-Rptd) None  List of any prescribed or over the counter sleep medication patient " takes: (Patient-Rptd) None  List of previous sleep medications that patient has tried: (Patient-Rptd) None  Patient drinks alcohol to help them sleep: (Patient-Rptd) No  Patient drinks alcohol near bedtime: (Patient-Rptd) No    Family History:  Patient has a family member been diagnosed with a sleep disorder: (Patient-Rptd) No      SCALES:    EPWORTH SLEEPINESS SCALE         1/8/2025     2:20 PM    [place] Sleepiness Scale ( JERRI Hi  [id number]<br>ESS - [place]/English - Final version - 21 Nov 07 - [facility] [place].)   Sitting and reading Would never doze   Watching TV Slight chance of dozing   Sitting, inactive in a public place (e.g. a theatre or a meeting) Would never doze   As a passenger in a car for an hour without a break Would never doze   Lying down to rest in the afternoon when circumstances permit Slight chance of dozing   Sitting and talking to someone Would never doze   Sitting quietly after a lunch without alcohol Would never doze   In a car, while stopped for a few minutes in traffic Would never doze   [place] Score (MC) 2   [place] Score (Sleep) 2        Patient-reported       INSOMNIA SEVERITY INDEX (JOCELINE)          1/8/2025     2:24 PM   Insomnia Severity Index (JOCELINE)   Difficulty falling asleep 1   Difficulty staying asleep 1   Problems waking up too early 0   How SATISFIED/DISSATISFIED are you with your CURRENT sleep pattern? 1   How NOTICEABLE to others do you think your sleep problem is in terms of impairing the quality of your life? 0   How WORRIED/DISTRESSED are you about your current sleep problem? 0   To what extent do you consider your sleep problem to INTERFERE with your daily functioning (e.g. daytime fatigue, mood, ability to function at work/daily chores, concentration, memory, mood, etc.) CURRENTLY? 0   JOCELINE Total Score 3        Patient-reported         Guidelines for Scoring/Interpretation:  Total score categories:  0-7 = No clinically significant insomnia   8-14 = Subthreshold  insomnia   15-21 = Clinical insomnia (moderate severity)  22-28 = Clinical insomnia (severe)  Used via courtesy of www.MyBuysealth.va.gov with permission from Onofre Roca PhD., [facility]    GAD7        6/5/2024    10:54 AM   NIC-7    1. Feeling nervous, anxious, or on edge 0   2. Not being able to stop or control worrying 0   3. Worrying too much about different things 0   4. Trouble relaxing 0   5. Being so restless that it is hard to sit still 0   6. Becoming easily annoyed or irritable 0   7. Feeling afraid, as if something awful might happen 0   NIC-7 Total Score 0   If you checked any problems, how difficult have they made it for you to do your work, take care of things at home, or get along with other people? Not difficult at all       PATIENT HEALTH QUESTIONNAIRE-9 (PHQ - 9)        6/5/2024    10:52 AM   PHQ-9 ([employer])   1.  Little interest or pleasure in doing things 0   2.  Feeling down, depressed, or hopeless 0   3.  Trouble falling or staying asleep, or sleeping too much 1   4.  Feeling tired or having little energy 0   5.  Poor appetite or overeating 0   6.  Feeling bad about yourself - or that you are a failure or have let yourself or your family down 0   7.  Trouble concentrating on things, such as reading the newspaper or watching television 0   8.  Moving or speaking so slowly that other people could have noticed. Or the opposite - being so fidgety or restless that you have been moving around a lot more than usual 0   9.  Thoughts that you would be better off dead, or of hurting yourself in some way 0   PHQ-9 Total Score 1   6.  Feeling bad about yourself 0   7.  Trouble concentrating 0   8.  Moving slowly or restless 0   9.  Suicidal or self-harm thoughts 0   1.  Little interest or pleasure in doing things Not at all   2.  Feeling down, depressed, or hopeless Not at all   3.  Trouble falling or staying asleep, or sleeping too much Several days   4.  Feeling tired or having little energy Not  at all   5.  Poor appetite or overeating Not at all   6.  Feeling bad about yourself Not at all   7.  Trouble concentrating Not at all   8.  Moving slowly or restless Not at all   9.  Suicidal or self-harm thoughts Not at all   PHQ-9 via [facility] TOTAL SCORE-----> 1 (Minimal depression)   Difficulty at work, home, or with people Not difficult at all       Developed by Rodney Devine, Ysabel Smith, Khari Meehan and colleagues, with an educational laura from [employer] No permission required to reproduce, translate, display or distribute.    Allergies:    No Known Allergies    Medications:    Current Outpatient Medications   Medication Sig Dispense Refill    cholecalciferol, vitamin D3, 1,000 unit tablet [CHOLECALCIFEROL, VITAMIN D3, 1,000 UNIT TABLET] Take 2,000 Units by mouth daily.      multivitamin with minerals (THERA-M) 9 mg iron-400 mcg Tab tablet [MULTIVITAMIN WITH MINERALS (THERA-M) 9 MG IRON-400 MCG TAB TABLET] Take 1 tablet by mouth daily.  0    simvastatin (ZOCOR) 20 MG tablet Take 1 tablet (20 mg) by mouth at bedtime 90 tablet 3    vitamin D3 (CHOLECALCIFEROL) 50 mcg (2000 units) tablet Take 1 tablet by mouth daily         Problem List:  Patient Active Problem List    Diagnosis Date Noted    Malignant neoplasm of upper-outer quadrant of right breast in female, estrogen receptor negative (H) 12/22/2015     Priority: High    Aspiration pneumonitis (H) 08/10/2022     Priority: Medium    Hypoxia 08/10/2022     Priority: Medium    Mixed hyperlipidemia      Priority: Medium     Created by Conversion        Osteopenia      Priority: Medium     Created by Conversion  Replacement Utility updated for latest IMO load        Peripheral neuropathy due to chemotherapy 06/06/2016     Priority: Medium    Benign Adenomatous Polyp Of The Large Intestine      Priority: Medium     Two found on colonoscopy 3/9/2010.  F/U colonoscopy 1/22/2014 found one 1   mm polyp.  Pathology could not be accomplished due to  "polyp size.        Prediabetes      Priority: Medium     Created by Conversion            Past Medical/Surgical History:  Past Medical History:   Diagnosis Date    Aspiration pneumonitis (H) 08/10/2022    aspirated during colonoscopy, hypoxia    Closed fracture of lateral malleolus of right ankle 09/02/2004    fell in a hole golfing.    Colon polyps 09/09/2008    2 polyps found on colonoscopy.    Gastric erosions 01/28/2020    Reactive gastropathy.    H/O colonoscopy 01/01/2014    repeat 5 years    H/O colonoscopy 08/10/2022    tubular adenoma and sessile serrated polps as well as hyperplastic, no need to repeat per GI    Hiatal hernia 01/28/2020    Hx antineoplastic chemotherapy 01/01/2016    Right Breast    Hx of radiation therapy 06/01/2016    Right Breast    Hyperlipidemia     Malignant neoplasm of upper-outer quadrant of right breast in female, estrogen receptor negative (H) 12/22/2015    Menopause     1996    Osteoarthritis     Osteopenia of multiple sites 09/2021    repeat bdt 2 yrs     Past Surgical History:   Procedure Laterality Date    BIOPSY BREAST Right 2015    dx breast cancer    COLONOSCOPY W/ POLYPECTOMY  05/16/2019    DENTAL SURGERY      2010    ESOPHAGOSCOPY, GASTROSCOPY, DUODENOSCOPY (EGD), COMBINED  01/28/2020    Large hiatal hernia.  Few stomach erosions.  Biopsy showed reactive gastropathy.  H. pylori negative.    KNEE SURGERY Left 1975    For \"torn ligaments\"    LUMPECTOMY BREAST Right 01/18/2016    Dr. Jerry.    LUMPECTOMY BREAST Right 07/06/2016    Re-excision lumpectomy and port removal, Dr. Jerry    PHACOEMULSIFICATION CLEAR CORNEA WITH STANDARD INTRAOCULAR LENS IMPLANT Bilateral Left 11/19/13, Right 12/3/13    SENTINEL LYMPH NODE BIOPSY Right 01/18/2016    Dr. Jerry.       Social History:  Social History     Socioeconomic History    Marital status: Single     Spouse name: Not on file    Number of children: 1    Years of education: Not on file    Highest education level: Not on file "   Occupational History    Not on file   Tobacco Use    Smoking status: Never     Passive exposure: Never    Smokeless tobacco: Never    Tobacco comments:     No exposure   Vaping Use    Vaping status: Never Used   Substance and Sexual Activity    Alcohol use: Yes     Alcohol/week: 1.7 standard drinks of alcohol    Drug use: No    Sexual activity: Not Currently   Other Topics Concern    Not on file   Social History Narrative    Never .  One daughter, two grandchildren.  Forced halfway from banking in Fall 2009.     Social Drivers of Health     Financial Resource Strain: Low Risk  (6/5/2024)    Financial Resource Strain     Within the past 12 months, have you or your family members you live with been unable to get utilities (heat, electricity) when it was really needed?: No   Food Insecurity: Low Risk  (6/5/2024)    Food Insecurity     Within the past 12 months, did you worry that your food would run out before you got money to buy more?: No     Within the past 12 months, did the food you bought just not last and you didn t have money to get more?: No   Transportation Needs: Low Risk  (6/5/2024)    Transportation Needs     Within the past 12 months, has lack of transportation kept you from medical appointments, getting your medicines, non-medical meetings or appointments, work, or from getting things that you need?: No   Physical Activity: Insufficiently Active (6/5/2024)    Exercise Vital Sign     Days of Exercise per Week: 3 days     Minutes of Exercise per Session: 20 min   Stress: No Stress Concern Present (6/5/2024)    [place] [employer] - Occupational Stress Questionnaire     Feeling of Stress : Not at all   Social Connections: Unknown (6/5/2024)    Social Connection and Isolation Panel [NHANES]     Frequency of Communication with Friends and Family: Not on file     Frequency of Social Gatherings with Friends and Family: Twice a week     Attends Faith Services: Not on file      "Active Member of Clubs or Organizations: Not on file     Attends Club or Organization Meetings: Not on file     Marital Status: Not on file   Interpersonal Safety: Low Risk  (2024)    Interpersonal Safety     Do you feel physically and emotionally safe where you currently live?: Yes     Within the past 12 months, have you been hit, slapped, kicked or otherwise physically hurt by someone?: No     Within the past 12 months, have you been humiliated or emotionally abused in other ways by your partner or ex-partner?: No   Housing Stability: Low Risk  (2024)    Housing Stability     Do you have housing? : Yes     Are you worried about losing your housing?: No       Family History:  Family History   Problem Relation Age of Onset    Breast Cancer Paternal Aunt         age in 70's    Heart Disease Father         \"Enlarged heart and liver\",  age 48, not much info.     Thyroid Disease Sister     No Known Problems Daughter        Review of Systems:  In the last TWO WEEKS have you experienced any of the following symptoms?  Fevers: (Patient-Rptd) No  Night Sweats: (Patient-Rptd) No  Weight Gain: (Patient-Rptd) No  Pain at Night: (Patient-Rptd) No  Double Vision: (Patient-Rptd) No  Changes in Vision: (Patient-Rptd) No  Difficulty Breathing through Nose: (Patient-Rptd) No  Sore Throat in Morning: (Patient-Rptd) No  Dry Mouth in the Morning: (Patient-Rptd) No  Shortness of Breath Lying Flat: (Patient-Rptd) No  Shortness of Breath With Activity: (Patient-Rptd) No  Awakening with Shortness of Breath: (Patient-Rptd) No  Increased Cough: (Patient-Rptd) No  Heart Racing at Night: (Patient-Rptd) No  Swelling in Feet or Legs: (Patient-Rptd) Yes  Diarrhea at Night: (Patient-Rptd) No  Heartburn at Night: (Patient-Rptd) No  Urinating More than Once at Night: (Patient-Rptd) Yes  Losing Control of Urine at Night: (Patient-Rptd) No  Joint Pains at Night: (Patient-Rptd) No  Headaches in Morning: (Patient-Rptd) No  Weakness in Arms " "or Legs: (Patient-Rptd) No  Depressed Mood: (Patient-Rptd) No  Anxiety: (Patient-Rptd) No     Physical Examination:  Vitals: Pulse 100   Ht 1.619 m (5' 3.75\")   Wt 83.1 kg (183 lb 1.6 oz)   LMP  (LMP Unknown)   SpO2 98%   BMI 31.68 kg/m    BMI= Body mass index is 31.68 kg/m .  General appearance: Awake, alert, cooperative. Well groomed. Sitting comfortably in chair. In no apparent distress.  HEENT: Head: Normocephalic, atraumatic. Eyes: PERRL. Conjunctiva clear. Sclera normal. Nose: External appearance normal.   Neck: Neck Cir (cm): 41 cm (1/15/2025 12:57 PM)  Skin:  No rashes or significant lesions.   Neurologic: Alert, oriented x3. No focal neurological deficit. Gait normal.   Psychiatric: Mood euthymic. Affect congruent with full range and intensity.           Data: All pertinent previous laboratory data reviewed     Recent Labs   Lab Test 06/06/24  0937 05/31/23  1014 08/11/22  0738 08/10/22  1414   NA  --   --  134* 136   POTASSIUM  --   --  3.6 3.7   CHLORIDE  --   --  100 104   CO2  --   --  24 21*   ANIONGAP  --   --  10 11   * 102* 119 135*   BUN  --   --  8 8   CR  --   --  0.79 0.80   TATI  --   --  9.4 9.4       Recent Labs   Lab Test 06/06/24  0937   WBC 4.3   RBC 4.00   HGB 13.2   HCT 39.7   MCV 99   MCH 33.0   MCHC 33.2   RDW 12.9          Recent Labs   Lab Test 08/10/22  1414   PROTTOTAL 7.1   ALBUMIN 4.0   BILITOTAL 0.6   ALKPHOS 83   AST 26   ALT 22       No results found for: \"TSH\"    No results found for: \"UAMP\", \"UBARB\", \"BENZODIAZEUR\", \"UCANN\", \"UCOC\", \"OPIT\", \"UPCP\"    No results found for: \"IRONSAT\", \"QK00103\", \"REBECCA\"    No results found for: \"PH\", \"PHARTERIAL\", \"PO2\", \"[id number]\", \"SAT\", \"PCO2\", \"HCO3\", \"BASEEXCESS\", \"SONAL\", \"BEB\"    @LABRCNTIPR(phv:4,pco2v:4,po2v:4,hco3v:4,mariela:4,o2per:4)@    Echocardiology: No results found for this or any previous visit (from the past 4320 hours).    Chest x-ray: No results found for this or any previous visit from the past 365 " "days.      Chest CT: No results found for this or any previous visit from the past 365 days.      PFT: Most Recent [facility] Pulmonary Function Testing    No results found for: \"20001\"  No results found for: \"20002\"  No results found for: \"20003\"  No results found for: \"20015\"  No results found for: \"20016\"  No results found for: \"20027\"  No results found for: \"20028\"  No results found for: \"20029\"  No results found for: \"20079\"  No results found for: \"20080\"  No results found for: \"20081\"  No results found for: \"20335\"  No results found for: \"20105\"  No results found for: \"20053\"  No results found for: \"20054\"  No results found for: \"20055\"      Soha Mayer PA-C 1/15/2025     [facility]  [address] [address]     [facility]  [address], [place] [address]    Chart documentation was completed, in part, with [employer] voice-recognition software. Even though reviewed, some grammatical, spelling, and word errors may remain.    60 minutes spent on day of encounter reviewing medical records, history and physical examination, review of previous testing and interpretation, documentation and further activities as noted above          "

## 2025-01-15 NOTE — NURSING NOTE
"Chief Complaint   Patient presents with    Sleep Problem     Snoring        Initial BP (!) 185/112   Pulse 100   Ht 1.619 m (5' 3.75\")   Wt 83.1 kg (183 lb 1.6 oz)   LMP  (LMP Unknown)   SpO2 98%   BMI 31.68 kg/m   Estimated body mass index is 31.68 kg/m  as calculated from the following:    Height as of this encounter: 1.619 m (5' 3.75\").    Weight as of this encounter: 83.1 kg (183 lb 1.6 oz).    Medication Reconciliation: complete  ESS 2  JOCELINE 3  Stacey Benito MA      "

## 2025-06-04 SDOH — HEALTH STABILITY: PHYSICAL HEALTH: ON AVERAGE, HOW MANY MINUTES DO YOU ENGAGE IN EXERCISE AT THIS LEVEL?: 20 MIN

## 2025-06-04 SDOH — HEALTH STABILITY: PHYSICAL HEALTH: ON AVERAGE, HOW MANY DAYS PER WEEK DO YOU ENGAGE IN MODERATE TO STRENUOUS EXERCISE (LIKE A BRISK WALK)?: 2 DAYS

## 2025-06-04 ASSESSMENT — SOCIAL DETERMINANTS OF HEALTH (SDOH): HOW OFTEN DO YOU GET TOGETHER WITH FRIENDS OR RELATIVES?: TWICE A WEEK

## 2025-06-09 ENCOUNTER — RESULTS FOLLOW-UP (OUTPATIENT)
Dept: FAMILY MEDICINE | Facility: CLINIC | Age: 80
End: 2025-06-09

## 2025-06-09 ENCOUNTER — OFFICE VISIT (OUTPATIENT)
Dept: FAMILY MEDICINE | Facility: CLINIC | Age: 80
End: 2025-06-09
Attending: FAMILY MEDICINE
Payer: COMMERCIAL

## 2025-06-09 VITALS
HEIGHT: 64 IN | BODY MASS INDEX: 30.58 KG/M2 | TEMPERATURE: 98.4 F | RESPIRATION RATE: 10 BRPM | DIASTOLIC BLOOD PRESSURE: 86 MMHG | SYSTOLIC BLOOD PRESSURE: 138 MMHG | WEIGHT: 179.1 LBS | OXYGEN SATURATION: 98 % | HEART RATE: 91 BPM

## 2025-06-09 DIAGNOSIS — Z17.1 MALIGNANT NEOPLASM OF UPPER-OUTER QUADRANT OF RIGHT BREAST IN FEMALE, ESTROGEN RECEPTOR NEGATIVE (H): ICD-10-CM

## 2025-06-09 DIAGNOSIS — M89.9 DISORDER OF BONE AND CARTILAGE: ICD-10-CM

## 2025-06-09 DIAGNOSIS — G62.0 PERIPHERAL NEUROPATHY DUE TO CHEMOTHERAPY: ICD-10-CM

## 2025-06-09 DIAGNOSIS — T45.1X5A PERIPHERAL NEUROPATHY DUE TO CHEMOTHERAPY: ICD-10-CM

## 2025-06-09 DIAGNOSIS — R73.09 OTHER ABNORMAL GLUCOSE: ICD-10-CM

## 2025-06-09 DIAGNOSIS — M94.9 DISORDER OF BONE AND CARTILAGE: ICD-10-CM

## 2025-06-09 DIAGNOSIS — Z78.0 ASYMPTOMATIC MENOPAUSAL STATE: ICD-10-CM

## 2025-06-09 DIAGNOSIS — C50.411 MALIGNANT NEOPLASM OF UPPER-OUTER QUADRANT OF RIGHT BREAST IN FEMALE, ESTROGEN RECEPTOR NEGATIVE (H): ICD-10-CM

## 2025-06-09 DIAGNOSIS — E78.2 MIXED HYPERLIPIDEMIA: ICD-10-CM

## 2025-06-09 DIAGNOSIS — Z00.00 ENCOUNTER FOR MEDICARE ANNUAL WELLNESS EXAM: Primary | ICD-10-CM

## 2025-06-09 PROBLEM — R09.02 HYPOXIA: Status: RESOLVED | Noted: 2022-08-10 | Resolved: 2025-06-09

## 2025-06-09 PROBLEM — J69.0 ASPIRATION PNEUMONITIS (H): Status: RESOLVED | Noted: 2022-08-10 | Resolved: 2025-06-09

## 2025-06-09 LAB
ALT SERPL W P-5'-P-CCNC: 20 U/L (ref 0–50)
ANION GAP SERPL CALCULATED.3IONS-SCNC: 12 MMOL/L (ref 7–15)
BUN SERPL-MCNC: 7.5 MG/DL (ref 8–23)
CALCIUM SERPL-MCNC: 9.9 MG/DL (ref 8.8–10.4)
CHLORIDE SERPL-SCNC: 104 MMOL/L (ref 98–107)
CHOLEST SERPL-MCNC: 157 MG/DL
CREAT SERPL-MCNC: 0.81 MG/DL (ref 0.51–0.95)
EGFRCR SERPLBLD CKD-EPI 2021: 73 ML/MIN/1.73M2
ERYTHROCYTE [DISTWIDTH] IN BLOOD BY AUTOMATED COUNT: 12.6 % (ref 10–15)
EST. AVERAGE GLUCOSE BLD GHB EST-MCNC: 108 MG/DL
FASTING STATUS PATIENT QL REPORTED: YES
FASTING STATUS PATIENT QL REPORTED: YES
GLUCOSE SERPL-MCNC: 108 MG/DL (ref 70–99)
HBA1C MFR BLD: 5.4 % (ref 0–5.6)
HCO3 SERPL-SCNC: 25 MMOL/L (ref 22–29)
HCT VFR BLD AUTO: 40 % (ref 35–47)
HDLC SERPL-MCNC: 63 MG/DL
HGB BLD-MCNC: 13.3 G/DL (ref 11.7–15.7)
LDLC SERPL CALC-MCNC: 81 MG/DL
MCH RBC QN AUTO: 32.8 PG (ref 26.5–33)
MCHC RBC AUTO-ENTMCNC: 33.3 G/DL (ref 31.5–36.5)
MCV RBC AUTO: 99 FL (ref 78–100)
NONHDLC SERPL-MCNC: 94 MG/DL
PLATELET # BLD AUTO: 204 10E3/UL (ref 150–450)
POTASSIUM SERPL-SCNC: 4.2 MMOL/L (ref 3.4–5.3)
RBC # BLD AUTO: 4.05 10E6/UL (ref 3.8–5.2)
SODIUM SERPL-SCNC: 141 MMOL/L (ref 135–145)
TRIGL SERPL-MCNC: 65 MG/DL
WBC # BLD AUTO: 3.9 10E3/UL (ref 4–11)

## 2025-06-09 PROCEDURE — 80048 BASIC METABOLIC PNL TOTAL CA: CPT | Performed by: NURSE PRACTITIONER

## 2025-06-09 PROCEDURE — 80061 LIPID PANEL: CPT | Performed by: NURSE PRACTITIONER

## 2025-06-09 PROCEDURE — 84460 ALANINE AMINO (ALT) (SGPT): CPT | Performed by: NURSE PRACTITIONER

## 2025-06-09 PROCEDURE — 99214 OFFICE O/P EST MOD 30 MIN: CPT | Mod: 25 | Performed by: NURSE PRACTITIONER

## 2025-06-09 PROCEDURE — 83036 HEMOGLOBIN GLYCOSYLATED A1C: CPT | Performed by: NURSE PRACTITIONER

## 2025-06-09 PROCEDURE — G2211 COMPLEX E/M VISIT ADD ON: HCPCS | Performed by: NURSE PRACTITIONER

## 2025-06-09 PROCEDURE — 36415 COLL VENOUS BLD VENIPUNCTURE: CPT | Performed by: NURSE PRACTITIONER

## 2025-06-09 PROCEDURE — 3044F HG A1C LEVEL LT 7.0%: CPT | Performed by: NURSE PRACTITIONER

## 2025-06-09 PROCEDURE — 3079F DIAST BP 80-89 MM HG: CPT | Performed by: NURSE PRACTITIONER

## 2025-06-09 PROCEDURE — G0439 PPPS, SUBSEQ VISIT: HCPCS | Performed by: NURSE PRACTITIONER

## 2025-06-09 PROCEDURE — 85027 COMPLETE CBC AUTOMATED: CPT | Performed by: NURSE PRACTITIONER

## 2025-06-09 PROCEDURE — 1126F AMNT PAIN NOTED NONE PRSNT: CPT | Performed by: NURSE PRACTITIONER

## 2025-06-09 PROCEDURE — 3075F SYST BP GE 130 - 139MM HG: CPT | Performed by: NURSE PRACTITIONER

## 2025-06-09 RX ORDER — SIMVASTATIN 20 MG
20 TABLET ORAL AT BEDTIME
Qty: 90 TABLET | Refills: 3 | Status: SHIPPED | OUTPATIENT
Start: 2025-06-09

## 2025-06-09 ASSESSMENT — PAIN SCALES - GENERAL: PAINLEVEL_OUTOF10: NO PAIN (0)

## 2025-06-09 NOTE — PATIENT INSTRUCTIONS
Patient Education   Preventive Care Advice   This is general advice given by our system to help you stay healthy. However, your care team may have specific advice just for you. Please talk to your care team about your preventive care needs.  Nutrition  Eat 5 or more servings of fruits and vegetables each day.  Try wheat bread, brown rice and whole grain pasta (instead of white bread, rice, and pasta).  Get enough calcium and vitamin D. Check the label on foods and aim for 100% of the RDA (recommended daily allowance).  Lifestyle  Exercise at least 150 minutes each week  (30 minutes a day, 5 days a week).  Do muscle strengthening activities 2 days a week. These help control your weight and prevent disease.  No smoking.  Wear sunscreen to prevent skin cancer.  Have a dental exam and cleaning every 6 months.  Yearly exams  See your health care team every year to talk about:  Any changes in your health.  Any medicines your care team has prescribed.  Preventive care, family planning, and ways to prevent chronic diseases.  Shots (vaccines)   HPV shots (up to age 26), if you've never had them before.  Hepatitis B shots (up to age 59), if you've never had them before.  COVID-19 shot: Get this shot when it's due.  Flu shot: Get a flu shot every year.  Tetanus shot: Get a tetanus shot every 10 years.  Pneumococcal, hepatitis A, and RSV shots: Ask your care team if you need these based on your risk.  Shingles shot (for age 50 and up)  General health tests  Diabetes screening:  Starting at age 35, Get screened for diabetes at least every 3 years.  If you are younger than age 35, ask your care team if you should be screened for diabetes.  Cholesterol test: At age 39, start having a cholesterol test every 5 years, or more often if advised.  Bone density scan (DEXA): At age 50, ask your care team if you should have this scan for osteoporosis (brittle bones).  Hepatitis C: Get tested at least once in your life.  STIs (sexually  transmitted infections)  Before age 24: Ask your care team if you should be screened for STIs.  After age 24: Get screened for STIs if you're at risk. You are at risk for STIs (including HIV) if:  You are sexually active with more than one person.  You don't use condoms every time.  You or a partner was diagnosed with a sexually transmitted infection.  If you are at risk for HIV, ask about PrEP medicine to prevent HIV.  Get tested for HIV at least once in your life, whether you are at risk for HIV or not.  Cancer screening tests  Cervical cancer screening: If you have a cervix, begin getting regular cervical cancer screening tests starting at age 21.  Breast cancer scan (mammogram): If you've ever had breasts, begin having regular mammograms starting at age 40. This is a scan to check for breast cancer.  Colon cancer screening: It is important to start screening for colon cancer at age 45.  Have a colonoscopy test every 10 years (or more often if you're at risk) Or, ask your provider about stool tests like a FIT test every year or Cologuard test every 3 years.  To learn more about your testing options, visit:   .  For help making a decision, visit:   https://bit.ly/kw01149.  Prostate cancer screening test: If you have a prostate, ask your care team if a prostate cancer screening test (PSA) at age 55 is right for you.  Lung cancer screening: If you are a current or former smoker ages 50 to 80, ask your care team if ongoing lung cancer screenings are right for you.  For informational purposes only. Not to replace the advice of your health care provider. Copyright   2023 [employer]. All rights reserved. Clinically reviewed by the [facility]. 1000memories 131512 - REV 01/24.

## 2025-06-09 NOTE — PROGRESS NOTES
Preventive Care Visit  [facility]  Oksana Wing CNP, Nurse Practitioner Primary Care  Jun 9, 2025        Subjective   Pat is a 79 year old, presenting for the following:  Wellness Visit (AWV. No concerns. ) and Forms (Needs updated hadicap sticker. )        6/9/2025     9:40 AM   Additional Questions   Roomed by Vernell AGUILAR CMA          HPI     Hyperlipidemia Follow-Up    Are you regularly taking any medication or supplement to lower your cholesterol?   Yes-    Are you having muscle aches or other side effects that you think could be caused by your cholesterol lowering medication?  Yes- possibly    Advance Care Planning    Patient states has Health Care Directive and will send to [facility].        6/4/2025   General Health   How would you rate your overall physical health? Good   Feel stress (tense, anxious, or unable to sleep) Not at all         6/4/2025   Nutrition   Diet: Regular (no restrictions)         6/4/2025   Exercise   Days per week of moderate/strenous exercise 2 days   Average minutes spent exercising at this level 20 min   (!) EXERCISE CONCERN      6/4/2025   Social Factors   Frequency of gathering with friends or relatives Twice a week   Worry food won't last until get money to buy more No   Food not last or not have enough money for food? No   Do you have housing? (Housing is defined as stable permanent housing and does not include staying outside in a car, in a tent, in an abandoned building, in an overnight shelter, or couch-surfing.) No   Are you worried about losing your housing? No   Lack of transportation? No   Unable to get utilities (heat,electricity)? No   Want help with housing or utility concern? No   (!) HOUSING CONCERN PRESENT      6/9/2025   Fall Risk   Gait Speed Test (Document in seconds) 6   Gait Speed Test Interpretation Greater than 5.01 seconds - ABNORMAL          6/4/2025   Activities of Daily Living- Home Safety   Needs help with the following  daily activites None of the above   Safety concerns in the home None of the above         6/4/2025   Dental   Dentist two times every year? Yes         6/4/2025   Hearing Screening   Hearing concerns? None of the above         6/4/2025   Driving Risk Screening   Patient/family members have concerns about driving No         6/4/2025   General Alertness/Fatigue Screening   Have you been more tired than usual lately? No         6/4/2025   Urinary Incontinence Screening   Bothered by leaking urine in past 6 months Yes         Today's PHQ-2 Score:       6/8/2025     6:07 PM   PHQ-2 ( 1999 [employer])   Q1: Little interest or pleasure in doing things 0   Q2: Feeling down, depressed or hopeless 0   PHQ-2 Score 0    Q1: Little interest or pleasure in doing things Not at all   Q2: Feeling down, depressed or hopeless Not at all   PHQ-2 Score 0       Patient-reported           6/4/2025   Substance Use   Alcohol more than 3/day or more than 7/wk No   Do you have a current opioid prescription? No   How severe/bad is pain from 1 to 10? 0/10 (No Pain)   Do you use any other substances recreationally? No     Social History     Tobacco Use    Smoking status: Never     Passive exposure: Never    Smokeless tobacco: Never    Tobacco comments:     No exposure   Vaping Use    Vaping status: Never Used   Substance Use Topics    Alcohol use: Yes     Alcohol/week: 1.7 standard drinks of alcohol    Drug use: No           12/2/2024   LAST FHS-7 RESULTS   1st degree relative breast or ovarian cancer No   Any relative bilateral breast cancer No   Any male have breast cancer No   Any ONE woman have BOTH breast AND ovarian cancer No   Any woman with breast cancer before 50yrs No   2 or more relatives with breast AND/OR ovarian cancer No   2 or more relatives with breast AND/OR bowel cancer No        Mammogram Screening - After age 74- determine frequency with patient based on health status, life expectancy and patient goals            Reviewed and  "updated as needed this visit by Provider                    Past Medical History:   Diagnosis Date    Aspiration pneumonitis (H) 08/10/2022    aspirated during colonoscopy, hypoxia    Closed fracture of lateral malleolus of right ankle 09/02/2004    fell in a hole golfing.    Colon polyps 09/09/2008    2 polyps found on colonoscopy.    Gastric erosions 01/28/2020    Reactive gastropathy.    H/O colonoscopy 01/01/2014    repeat 5 years    H/O colonoscopy 08/10/2022    tubular adenoma and sessile serrated polps as well as hyperplastic, no need to repeat per GI    Hiatal hernia 01/28/2020    Hx antineoplastic chemotherapy 01/01/2016    Right Breast    Hx of radiation therapy 06/01/2016    Right Breast    Hyperlipidemia     Hypoxia 08/10/2022    Malignant neoplasm of upper-outer quadrant of right breast in female, estrogen receptor negative (H) 12/22/2015    Menopause     1996    Osteoarthritis     Osteopenia of multiple sites 09/2021    repeat bdt 2 yrs     Past Surgical History:   Procedure Laterality Date    BIOPSY BREAST Right 2015    dx breast cancer    COLONOSCOPY W/ POLYPECTOMY  05/16/2019    DENTAL SURGERY      2010    ESOPHAGOSCOPY, GASTROSCOPY, DUODENOSCOPY (EGD), COMBINED  01/28/2020    Large hiatal hernia.  Few stomach erosions.  Biopsy showed reactive gastropathy.  H. pylori negative.    KNEE SURGERY Left 1975    For \"torn ligaments\"    LUMPECTOMY BREAST Right 01/18/2016    Dr. Jerry.    LUMPECTOMY BREAST Right 07/06/2016    Re-excision lumpectomy and port removal, Dr. Jerry    PHACOEMULSIFICATION CLEAR CORNEA WITH STANDARD INTRAOCULAR LENS IMPLANT Bilateral Left 11/19/13, Right 12/3/13    SENTINEL LYMPH NODE BIOPSY Right 01/18/2016    Dr. Jerry.     Lab work is in process  Labs reviewed in EPIC  Current providers sharing in care for this patient include:  Patient Care Team:  Oksana Wing CNP as PCP - General (Nurse Practitioner Primary Care)  Kristy Mina MD as MD (Hematology & Oncology)  Moreno, " "Shoa Santos PA-C as Assigned Sleep Provider  Mario Marie MD as Assigned PCP    The following health maintenance items are reviewed in Epic and correct as of today:  Health Maintenance   Topic Date Due    RSV VACCINE (1 - 1-dose 75+ series) Never done    ANNUAL REVIEW OF HM ORDERS  05/31/2024    COVID-19 VACCINE (4 - 2024-25 season) 09/01/2024    LIPID  06/06/2025    MEDICARE ANNUAL WELLNESS VISIT  06/06/2025    MAMMO SCREENING  12/02/2025    FALL RISK ASSESSMENT  06/09/2026    DIABETES SCREENING  06/06/2027    ADVANCE CARE PLANNING  06/07/2028    DTAP/TDAP/TD VACCINE (2 - Td or Tdap) 02/10/2032    DEXA  09/01/2038    PHQ-2 (once per calendar year)  Completed    INFLUENZA VACCINE  Completed    PNEUMOCOCCAL VACCINE 50+ YEARS  Completed    ZOSTER VACCINE  Completed    HPV VACCINE  Aged Out    MENINGITIS VACCINE  Aged Out    COLORECTAL CANCER SCREENING  Discontinued         Review of Systems  Constitutional, HEENT, cardiovascular, pulmonary, GI, , musculoskeletal, neuro, skin, endocrine and psych systems are negative, except as otherwise noted.  Neuropathy in hands and feet following chemotherapy- occasional falls, uses cane as needed- needs handicapped parking renewed.      Objective    Exam  /86 (BP Location: Left arm, Patient Position: Sitting, Cuff Size: Adult Regular)   Pulse 91   Temp 98.4  F (36.9  C) (Oral)   Resp 10   Ht 1.613 m (5' 3.5\")   Wt 81.2 kg (179 lb 1.6 oz)   LMP  (LMP Unknown)   SpO2 98%   Breastfeeding No   BMI 31.23 kg/m     Estimated body mass index is 31.23 kg/m  as calculated from the following:    Height as of this encounter: 1.613 m (5' 3.5\").    Weight as of this encounter: 81.2 kg (179 lb 1.6 oz).    Physical Exam  GENERAL: alert and no distress  EYES: Eyes grossly normal to inspection, PERRL and conjunctivae and sclerae normal  HENT: ear canals and TM's normal, nose and mouth without ulcers or lesions  NECK: no adenopathy, no asymmetry, masses, or scars  RESP: " lungs clear to auscultation - no rales, rhonchi or wheezes  BREAST: s/ right lumpectomy- scars  CV: regular rate and rhythm, normal S1 S2, no S3 or S4, no murmur, click or rub, no peripheral edema  ABDOMEN: soft, nontender, no hepatosplenomegaly, no masses and bowel sounds normal  MS: no gross musculoskeletal defects noted, no edema  SKIN: no suspicious lesions or rashes  NEURO: Normal strength and tone, sensory deficit in feet and hands, and mentation intact  PSYCH: mentation appears normal, affect normal/bright  Gait and balance assessed per Gait Speed Test.  Result as above.        6/9/2025   Mini Cog   Clock Draw Score 2 Normal   3 Item Recall 2 objects recalled   Mini Cog Total Score 4            A/P:  1. Encounter for Medicare annual wellness exam (Primary)      2. Mixed hyperlipidemia  Stable  - Lipid panel reflex to direct LDL Fasting; Future  - ALT; Future  - Basic metabolic panel  (Ca, Cl, CO2, Creat, Gluc, K, Na, BUN); Future  - simvastatin (ZOCOR) 20 MG tablet; Take 1 tablet (20 mg) by mouth at bedtime.  Dispense: 90 tablet; Refill: 3  - Lipid panel reflex to direct LDL Fasting  - ALT  - Basic metabolic panel  (Ca, Cl, CO2, Creat, Gluc, K, Na, BUN)    3. Peripheral neuropathy due to chemotherapy  Stable    4. Osteopenia  - CBC with platelets; Future  - Basic metabolic panel  (Ca, Cl, CO2, Creat, Gluc, K, Na, BUN); Future  - DX Bone Density; Future  - CBC with platelets  - Basic metabolic panel  (Ca, Cl, CO2, Creat, Gluc, K, Na, BUN)    5. Prediabetes  - Basic metabolic panel  (Ca, Cl, CO2, Creat, Gluc, K, Na, BUN); Future  - Hemoglobin A1c; Future  - Basic metabolic panel  (Ca, Cl, CO2, Creat, Gluc, K, Na, BUN)  - Hemoglobin A1c    6. Asymptomatic menopausal state  - DX Bone Density; Future    7. Malignant neoplasm of upper-outer quadrant of right breast in female, estrogen receptor negative (H)  Completed treatments- continues yearly mammograms    Counseling  Appropriate preventive services were  addressed with this patient via screening, questionnaire, or discussion as appropriate for fall prevention, nutrition, physical activity, Tobacco-use cessation, social engagement, weight loss and cognition.  Checklist reviewing preventive services available has been given to the patient.  Reviewed patient's diet, addressing concerns and/or questions.   The patient was instructed to see the dentist every 6 months.      The longitudinal plan of care for the diagnosis(es)/condition(s) as documented were addressed during this visit. Due to the added complexity in care, I will continue to support Pat in the subsequent management and with ongoing continuity of care.   Signed Electronically by: Oksana Wing, CNP